# Patient Record
Sex: FEMALE | Race: WHITE | NOT HISPANIC OR LATINO | Employment: UNEMPLOYED | URBAN - METROPOLITAN AREA
[De-identification: names, ages, dates, MRNs, and addresses within clinical notes are randomized per-mention and may not be internally consistent; named-entity substitution may affect disease eponyms.]

---

## 2022-01-04 ENCOUNTER — TELEPHONE (OUTPATIENT)
Dept: BEHAVIORAL HEALTH | Facility: CLINIC | Age: 52
End: 2022-01-04

## 2022-01-04 ENCOUNTER — HOSPITAL ENCOUNTER (EMERGENCY)
Facility: CLINIC | Age: 52
Discharge: PSYCHIATRIC OR SUBSTANCE USE FACILITY | End: 2022-01-05
Attending: EMERGENCY MEDICINE | Admitting: EMERGENCY MEDICINE
Payer: COMMERCIAL

## 2022-01-04 DIAGNOSIS — F29 PSYCHOSIS, UNSPECIFIED PSYCHOSIS TYPE (H): ICD-10-CM

## 2022-01-04 DIAGNOSIS — R44.3 HALLUCINATIONS: ICD-10-CM

## 2022-01-04 DIAGNOSIS — Z91.148 NONCOMPLIANCE WITH MEDICATION REGIMEN: ICD-10-CM

## 2022-01-04 LAB
ALBUMIN UR-MCNC: NEGATIVE MG/DL
AMPHETAMINES UR QL SCN: ABNORMAL
ANION GAP SERPL CALCULATED.3IONS-SCNC: 7 MMOL/L (ref 3–14)
APPEARANCE UR: CLEAR
BARBITURATES UR QL: ABNORMAL
BASOPHILS # BLD AUTO: 0.1 10E3/UL (ref 0–0.2)
BASOPHILS NFR BLD AUTO: 1 %
BENZODIAZ UR QL: ABNORMAL
BILIRUB UR QL STRIP: NEGATIVE
BUN SERPL-MCNC: 9 MG/DL (ref 7–30)
CALCIUM SERPL-MCNC: 9.3 MG/DL (ref 8.5–10.1)
CANNABINOIDS UR QL SCN: ABNORMAL
CHLORIDE BLD-SCNC: 108 MMOL/L (ref 94–109)
CO2 SERPL-SCNC: 25 MMOL/L (ref 20–32)
COCAINE UR QL: ABNORMAL
COLOR UR AUTO: ABNORMAL
CREAT SERPL-MCNC: 0.8 MG/DL (ref 0.52–1.04)
EOSINOPHIL # BLD AUTO: 0.2 10E3/UL (ref 0–0.7)
EOSINOPHIL NFR BLD AUTO: 2 %
ERYTHROCYTE [DISTWIDTH] IN BLOOD BY AUTOMATED COUNT: 12.8 % (ref 10–15)
ETHANOL SERPL-MCNC: 0.12 G/DL
GFR SERPL CREATININE-BSD FRML MDRD: 90 ML/MIN/1.73M2
GLUCOSE BLD-MCNC: 84 MG/DL (ref 70–99)
GLUCOSE UR STRIP-MCNC: NEGATIVE MG/DL
HCG SERPL QL: NEGATIVE
HCT VFR BLD AUTO: 44.3 % (ref 35–47)
HGB BLD-MCNC: 14.8 G/DL (ref 11.7–15.7)
HGB UR QL STRIP: ABNORMAL
HOLD SPECIMEN: NORMAL
IMM GRANULOCYTES # BLD: 0 10E3/UL
IMM GRANULOCYTES NFR BLD: 0 %
KETONES UR STRIP-MCNC: NEGATIVE MG/DL
LEUKOCYTE ESTERASE UR QL STRIP: NEGATIVE
LYMPHOCYTES # BLD AUTO: 2.8 10E3/UL (ref 0.8–5.3)
LYMPHOCYTES NFR BLD AUTO: 31 %
MCH RBC QN AUTO: 32 PG (ref 26.5–33)
MCHC RBC AUTO-ENTMCNC: 33.4 G/DL (ref 31.5–36.5)
MCV RBC AUTO: 96 FL (ref 78–100)
MONOCYTES # BLD AUTO: 0.5 10E3/UL (ref 0–1.3)
MONOCYTES NFR BLD AUTO: 5 %
NEUTROPHILS # BLD AUTO: 5.5 10E3/UL (ref 1.6–8.3)
NEUTROPHILS NFR BLD AUTO: 61 %
NITRATE UR QL: NEGATIVE
NRBC # BLD AUTO: 0 10E3/UL
NRBC BLD AUTO-RTO: 0 /100
OPIATES UR QL SCN: ABNORMAL
PH UR STRIP: 6 [PH] (ref 5–7)
PLATELET # BLD AUTO: 407 10E3/UL (ref 150–450)
POTASSIUM BLD-SCNC: 3.1 MMOL/L (ref 3.4–5.3)
RBC # BLD AUTO: 4.63 10E6/UL (ref 3.8–5.2)
RBC URINE: 0 /HPF
SODIUM SERPL-SCNC: 140 MMOL/L (ref 133–144)
SP GR UR STRIP: 1 (ref 1–1.03)
SQUAMOUS EPITHELIAL: <1 /HPF
UROBILINOGEN UR STRIP-MCNC: NORMAL MG/DL
WBC # BLD AUTO: 8.9 10E3/UL (ref 4–11)
WBC URINE: <1 /HPF

## 2022-01-04 PROCEDURE — 36415 COLL VENOUS BLD VENIPUNCTURE: CPT | Performed by: EMERGENCY MEDICINE

## 2022-01-04 PROCEDURE — 93005 ELECTROCARDIOGRAM TRACING: CPT

## 2022-01-04 PROCEDURE — 99285 EMERGENCY DEPT VISIT HI MDM: CPT | Mod: 25

## 2022-01-04 PROCEDURE — 82310 ASSAY OF CALCIUM: CPT | Performed by: EMERGENCY MEDICINE

## 2022-01-04 PROCEDURE — 80307 DRUG TEST PRSMV CHEM ANLYZR: CPT | Performed by: EMERGENCY MEDICINE

## 2022-01-04 PROCEDURE — 90791 PSYCH DIAGNOSTIC EVALUATION: CPT

## 2022-01-04 PROCEDURE — 82077 ASSAY SPEC XCP UR&BREATH IA: CPT | Performed by: EMERGENCY MEDICINE

## 2022-01-04 PROCEDURE — 81001 URINALYSIS AUTO W/SCOPE: CPT | Mod: XU | Performed by: EMERGENCY MEDICINE

## 2022-01-04 PROCEDURE — 84703 CHORIONIC GONADOTROPIN ASSAY: CPT | Performed by: EMERGENCY MEDICINE

## 2022-01-04 PROCEDURE — 85025 COMPLETE CBC W/AUTO DIFF WBC: CPT | Performed by: EMERGENCY MEDICINE

## 2022-01-04 PROCEDURE — 250N000013 HC RX MED GY IP 250 OP 250 PS 637: Performed by: EMERGENCY MEDICINE

## 2022-01-04 PROCEDURE — C9803 HOPD COVID-19 SPEC COLLECT: HCPCS

## 2022-01-04 RX ORDER — OLANZAPINE 5 MG/1
10 TABLET, ORALLY DISINTEGRATING ORAL AT BEDTIME
Status: DISCONTINUED | OUTPATIENT
Start: 2022-01-04 | End: 2022-01-04

## 2022-01-04 RX ORDER — OLANZAPINE 10 MG/1
10 TABLET ORAL 2 TIMES DAILY
Status: DISCONTINUED | OUTPATIENT
Start: 2022-01-04 | End: 2022-01-05 | Stop reason: HOSPADM

## 2022-01-04 RX ORDER — POLYETHYLENE GLYCOL 3350 17 G
2 POWDER IN PACKET (EA) ORAL
Status: DISCONTINUED | OUTPATIENT
Start: 2022-01-04 | End: 2022-01-05 | Stop reason: HOSPADM

## 2022-01-04 RX ORDER — OLANZAPINE 5 MG/1
10 TABLET, ORALLY DISINTEGRATING ORAL ONCE
Status: COMPLETED | OUTPATIENT
Start: 2022-01-04 | End: 2022-01-04

## 2022-01-04 RX ORDER — ACETAMINOPHEN 325 MG/1
650 TABLET ORAL EVERY 4 HOURS PRN
Status: DISCONTINUED | OUTPATIENT
Start: 2022-01-04 | End: 2022-01-05 | Stop reason: HOSPADM

## 2022-01-04 RX ORDER — POTASSIUM CHLORIDE 750 MG/1
10 TABLET, EXTENDED RELEASE ORAL ONCE
Status: COMPLETED | OUTPATIENT
Start: 2022-01-04 | End: 2022-01-04

## 2022-01-04 RX ORDER — NICOTINE 21 MG/24HR
1 PATCH, TRANSDERMAL 24 HOURS TRANSDERMAL DAILY
Status: DISCONTINUED | OUTPATIENT
Start: 2022-01-04 | End: 2022-01-05 | Stop reason: HOSPADM

## 2022-01-04 RX ADMIN — OLANZAPINE 10 MG: 5 TABLET, ORALLY DISINTEGRATING ORAL at 18:34

## 2022-01-04 RX ADMIN — OLANZAPINE 10 MG: 10 TABLET, FILM COATED ORAL at 22:49

## 2022-01-04 RX ADMIN — POTASSIUM CHLORIDE 10 MEQ: 750 TABLET, FILM COATED, EXTENDED RELEASE ORAL at 18:34

## 2022-01-04 RX ADMIN — NICOTINE 1 PATCH: 14 PATCH, EXTENDED RELEASE TRANSDERMAL at 18:29

## 2022-01-04 RX ADMIN — NICOTINE POLACRILEX 2 MG: 2 LOZENGE ORAL at 21:17

## 2022-01-04 NOTE — ED TRIAGE NOTES
"Pt presents to ED via EMS. Pt was driving around \"looking for answers\" and decided to push her onstar and tell them she needs help because she is hearing voices. Pt has been off her psych meds for several months. Pt is here voluntarily because she does not want to be a danger to herself or others. ABC intact  "

## 2022-01-04 NOTE — ED NOTES
Bed: ED10  Expected date: 1/4/22  Expected time: 2:47 PM  Means of arrival: Ambulance  Comments:  [facility]

## 2022-01-04 NOTE — ED PROVIDER NOTES
History     Chief Complaint:    Psychiatric Evaluation    History was obtained from the patient as well as the [facility]'s office application for a hold as well as from the EMS report given to the nurse.  History is limited due to the patient's confusion and psychosis    HPI   Christina Vásquez is a 51 year old female who presents on a hold.  Per the triage note from EMS information the patient has been off her psychiatric meds for several months.  The patient said that since the death of her mother last June she feels that she is living in a groundhog day situation where every day is the same but not the exact same day.  She says that when she plugs in an address and the navigation of her car will take her in circles.  The radio is changing words laughing at her and talking to her.  She told me that today she called EMS because she was looking for her brother Milind and then said it was her  she was looking for.  The hold from the 's office as she was looking for her .  The patient said that she did drink alcohol today but only had 1 drink she says she drinks 1-2 drinks per day.  She said that she has had some spotting over the last several weeks and had some nausea but no chest pain fevers no head trauma no falls.  She said she was trying to find her brother ended up at a automotive shop called [employer] which is the name of her brother.  Her brother did not work there.  She said she had trouble getting an address.  And then per the hold the patient apparently told officers that she was on a treasure hunt looking for her  and appeared delusional their note said that she does not trust herself but wants help they said that she needed evaluation due to the statement of if she does not get help she will hurt somebody or herself.  Patient denies this currently    Review of Systems  10 point review of systems all negative except as in the HPI limited by patient cooperation and her flipping  "the off    Allergies:  Unknown    Medications:    Report of psychiatric medications that she is not taking    Past Medical History:    Patient says none however sounds like psychiatric problems from the history  Patient reports that she was told that her blood sugar was elevated the past       Past Surgical History:    Unknown    Family History:    Patient has a brother would not state more information about him    Social History:  Reports drinking 1-2 alcoholic beverages per day  Denies drug use    Physical Exam     Patient Vitals for the past 24 hrs:   BP Pulse Resp SpO2   01/04/22 1730 -- 103 12 100 %   01/04/22 1715 -- 100 19 96 %   01/04/22 1700 -- 74 12 100 %   01/04/22 1645 -- 70 13 100 %   01/04/22 1630 -- 73 14 100 %   01/04/22 1615 -- 85 21 100 %   01/04/22 1600 128/76 79 19 100 %   01/04/22 1545 (!) 129/100 85 22 100 %       Physical Exam  General: The patient is alert, in no respiratory distress.  Standing requesting pen to alter the hold from the 's office    HENT: Mucous membranes moist.  Atraumatic    Cardiovascular: Regular rate and rhythm. Good pulses in all four extremities. Normal capillary refill and skin turgor.     Respiratory: Lungs are clear. No nasal flaring. No retractions. No wheezing, no crackles.    Gastrointestinal: Abdomen soft. No guarding, no rebound. No palpable hernias.     Musculoskeletal: No gross deformity.     Skin: No rashes or petechiae.     Neurologic: The patient is alert and conversant.  No slurred speech adequate movement of all extremities no gross deficit    Lymphatic: No cervical adenopathy. No lower extremity swelling.    Psychiatric: The patient is non-tearful.  Patient is agitated and reports hearing voices and will state that \"They\" are doing things to her.  When asked who that is she says she does not know.    Emergency Department Course   ECG:  #1 at 1615 normal sinus rhythm nonspecific ST segment but good of 83  cures duration 84 and a QTC of 502 " with a prolonged QT     #2 EKG at 1704 normal sinus rhythm stable nonsevere ST ventricular 84  124 QRS of 88 QTC improved at 489    Laboratory:  Labs Ordered and Resulted from Time of ED Arrival to Time of ED Departure   ROUTINE UA WITH MICROSCOPIC - Abnormal       Result Value    Color Urine Straw      Appearance Urine Clear      Glucose Urine Negative      Bilirubin Urine Negative      Ketones Urine Negative      Specific Gravity Urine 1.002 (*)     Blood Urine Trace (*)     pH Urine 6.0      Protein Albumin Urine Negative      Urobilinogen Urine Normal      Nitrite Urine Negative      Leukocyte Esterase Urine Negative      RBC Urine 0      WBC Urine <1      Squamous Epithelials Urine <1     DRUG ABUSE SCREEN 1 URINE (ED) - Abnormal    Amphetamines Urine Screen Positive (*)     Barbiturates Urine Screen Negative      Benzodiazepines Urine Screen Negative      Cannabinoids Urine Screen Negative      Cocaine Urine Screen Negative      Opiates Urine Screen Negative     BASIC METABOLIC PANEL - Abnormal    Sodium 140      Potassium 3.1 (*)     Chloride 108      Carbon Dioxide (CO2) 25      Anion Gap 7      Urea Nitrogen 9      Creatinine 0.80      Calcium 9.3      Glucose 84      GFR Estimate 90     ETHYL ALCOHOL LEVEL - Abnormal    Alcohol ethyl 0.12 (*)    HCG QUALITATIVE PREGNANCY - Normal    hCG Serum Qualitative Negative     CBC WITH PLATELETS AND DIFFERENTIAL    WBC Count 8.9      RBC Count 4.63      Hemoglobin 14.8      Hematocrit 44.3      MCV 96      MCH 32.0      MCHC 33.4      RDW 12.8      Platelet Count 407      % Neutrophils 61      % Lymphocytes 31      % Monocytes 5      % Eosinophils 2      % Basophils 1      % Immature Granulocytes 0      NRBCs per 100 WBC 0      Absolute Neutrophils 5.5      Absolute Lymphocytes 2.8      Absolute Monocytes 0.5      Absolute Eosinophils 0.2      Absolute Basophils 0.1      Absolute Immature Granulocytes 0.0      Absolute NRBCs 0.0     COVID-19 VIRUS  (CORONAVIRUS) BY PCR       Procedures:  Seclusion due to the patient attempting to leave    Emergency Department Course:           Reviewed:    I reviewed nursing notes, vitals and past history  Paperwork from the 's department on a hold    Assessments:   I obtained history and examined the patient as noted above.    I rechecked the patient and explained findings.       Consults:   DEC: The patient was eval by keila.  Independent information was gathered by the nurse that the patient is psychotic with hallucinations and very disorganized in her thoughts.  She is not safe to go home and therefore be transferred to a psychiatric facility         Interventions:    Medications   nicotine Patch in Place (has no administration in time range)   nicotine (NICODERM CQ) 14 MG/24HR 24 hr patch 1 patch (1 patch Transdermal Patch/Med Applied 1/4/22 1829)   OLANZapine zydis (zyPREXA) ODT tab 10 mg (10 mg Oral Given 1/4/22 1834)   potassium chloride ER (K-TAB/KLOR-CON) CR tablet 10 mEq (10 mEq Oral Given 1/4/22 1834)       Disposition:  Care of the patient was transferred to my colleague, Dr. So, pending Transfer to a psychiatric inpatient bed..      Medical Decision Making:  The patient presented on a hold and appeared very psychotic in her thoughts.  She reported that she thought the radio was laughing at her that an unknown group of people or reprogramming her car to travel in circles.  The patient is not suicidal homicidal.  She is positive her drug screen for amphetamines which could be leading to this however it sounds like that the symptoms began at the time of the death of her mother.  The patient's daughter did call and and report that the patient did not her meds for some time has been very bizarre.  DEC agreed that the patient is not safe to go home due to her disorganized thought and they will work on an inpatient bed.  The patient was signed out to my oncoming colleague.    Covid-19  Christina Vásquez was  evaluated during a global COVID-19 pandemic, which necessitated consideration that the patient might be at risk for infection with the SARS-CoV-2 virus that causes COVID-19.   Applicable protocols for evaluation were followed during the patient's care.   COVID-19 was considered as part of the patient's evaluation.     Diagnosis:  1. Psychosis, unspecified psychosis type (H)    2. Hallucinations               Aleksandar Davenport MD  01/04/22 9305

## 2022-01-05 ENCOUNTER — HOSPITAL ENCOUNTER (INPATIENT)
Facility: CLINIC | Age: 52
LOS: 5 days | Discharge: HOME | DRG: 885 | End: 2022-01-10
Attending: PSYCHIATRY & NEUROLOGY | Admitting: PSYCHIATRY & NEUROLOGY

## 2022-01-05 VITALS
BODY MASS INDEX: 27.6 KG/M2 | WEIGHT: 150 LBS | HEIGHT: 62 IN | DIASTOLIC BLOOD PRESSURE: 87 MMHG | SYSTOLIC BLOOD PRESSURE: 142 MMHG | TEMPERATURE: 97.9 F | OXYGEN SATURATION: 97 % | HEART RATE: 74 BPM | RESPIRATION RATE: 16 BRPM

## 2022-01-05 DIAGNOSIS — J45.909 UNCOMPLICATED ASTHMA, UNSPECIFIED ASTHMA SEVERITY, UNSPECIFIED WHETHER PERSISTENT: ICD-10-CM

## 2022-01-05 DIAGNOSIS — F17.200 NICOTINE USE DISORDER: ICD-10-CM

## 2022-01-05 DIAGNOSIS — F31.2 BIPOLAR AFFECTIVE DISORDER, CURRENTLY MANIC, SEVERE, WITH PSYCHOTIC FEATURES (H): Primary | ICD-10-CM

## 2022-01-05 DIAGNOSIS — E56.9 VITAMIN DEFICIENCY: ICD-10-CM

## 2022-01-05 LAB
ATRIAL RATE - MUSE: 83 BPM
ATRIAL RATE - MUSE: 84 BPM
DIASTOLIC BLOOD PRESSURE - MUSE: NORMAL MMHG
DIASTOLIC BLOOD PRESSURE - MUSE: NORMAL MMHG
INTERPRETATION ECG - MUSE: NORMAL
INTERPRETATION ECG - MUSE: NORMAL
P AXIS - MUSE: 38 DEGREES
P AXIS - MUSE: 45 DEGREES
PR INTERVAL - MUSE: 122 MS
PR INTERVAL - MUSE: 124 MS
QRS DURATION - MUSE: 80 MS
QRS DURATION - MUSE: 84 MS
QT - MUSE: 414 MS
QT - MUSE: 428 MS
QTC - MUSE: 489 MS
QTC - MUSE: 502 MS
R AXIS - MUSE: 30 DEGREES
R AXIS - MUSE: 43 DEGREES
SARS-COV-2 RNA RESP QL NAA+PROBE: NEGATIVE
SYSTOLIC BLOOD PRESSURE - MUSE: NORMAL MMHG
SYSTOLIC BLOOD PRESSURE - MUSE: NORMAL MMHG
T AXIS - MUSE: 90 DEGREES
T AXIS - MUSE: 94 DEGREES
VENTRICULAR RATE- MUSE: 83 BPM
VENTRICULAR RATE- MUSE: 84 BPM

## 2022-01-05 PROCEDURE — 250N000013 HC RX MED GY IP 250 OP 250 PS 637: Performed by: EMERGENCY MEDICINE

## 2022-01-05 PROCEDURE — 128N000001 HC R&B CD/MH ADULT

## 2022-01-05 PROCEDURE — 87635 SARS-COV-2 COVID-19 AMP PRB: CPT | Performed by: EMERGENCY MEDICINE

## 2022-01-05 RX ORDER — VENLAFAXINE HYDROCHLORIDE 150 MG/1
150 CAPSULE, EXTENDED RELEASE ORAL DAILY
Status: DISCONTINUED | OUTPATIENT
Start: 2022-01-05 | End: 2022-01-05 | Stop reason: HOSPADM

## 2022-01-05 RX ORDER — ALBUTEROL SULFATE 90 UG/1
2 AEROSOL, METERED RESPIRATORY (INHALATION) EVERY 4 HOURS PRN
Status: ON HOLD | COMMUNITY
End: 2022-01-10

## 2022-01-05 RX ORDER — OLANZAPINE 5 MG/1
5 TABLET, ORALLY DISINTEGRATING ORAL 2 TIMES DAILY
Status: DISCONTINUED | OUTPATIENT
Start: 2022-01-05 | End: 2022-01-05

## 2022-01-05 RX ORDER — ESCITALOPRAM OXALATE 5 MG/1
10 TABLET ORAL DAILY
Status: DISCONTINUED | OUTPATIENT
Start: 2022-01-05 | End: 2022-01-05 | Stop reason: HOSPADM

## 2022-01-05 RX ORDER — ESCITALOPRAM OXALATE 10 MG/1
10 TABLET ORAL DAILY
Status: ON HOLD | COMMUNITY
End: 2022-01-10

## 2022-01-05 RX ORDER — VENLAFAXINE HYDROCHLORIDE 150 MG/1
150 CAPSULE, EXTENDED RELEASE ORAL DAILY
Status: ON HOLD | COMMUNITY
End: 2022-01-10

## 2022-01-05 RX ORDER — CLONAZEPAM 0.5 MG/1
0.5 TABLET ORAL 2 TIMES DAILY PRN
Status: ON HOLD | COMMUNITY
End: 2022-01-10

## 2022-01-05 RX ORDER — CLONAZEPAM 0.5 MG/1
0.5 TABLET ORAL 2 TIMES DAILY PRN
Status: DISCONTINUED | OUTPATIENT
Start: 2022-01-05 | End: 2022-01-05 | Stop reason: HOSPADM

## 2022-01-05 RX ADMIN — VENLAFAXINE HYDROCHLORIDE 150 MG: 150 CAPSULE, EXTENDED RELEASE ORAL at 17:14

## 2022-01-05 RX ADMIN — OLANZAPINE 10 MG: 10 TABLET, FILM COATED ORAL at 09:32

## 2022-01-05 RX ADMIN — ESCITALOPRAM 10 MG: 5 TABLET, FILM COATED ORAL at 17:14

## 2022-01-05 RX ADMIN — OLANZAPINE 10 MG: 10 TABLET, FILM COATED ORAL at 20:39

## 2022-01-05 ASSESSMENT — MIFFLIN-ST. JEOR
SCORE: 1266.36
SCORE: 1258.65

## 2022-01-05 NOTE — TELEPHONE ENCOUNTER
1:30pm Bed Search Update:    No MHICU beds at [place] at this time    [facility] beds available  [facility] beds available  [facility] beds available  [facility]-No beds available  [facility]-No beds available  [facility]-No beds available  [facility]-No beds available  [facility]-No beds available  [facility]-No beds available  [facility]-Low acuity only. Mixed unit (adol, adult and eduard)  [place]-No beds available.  No current aggression  [place]-No beds available  [facility]-No beds available  [place]-No beds available  [facility]-No beds available  [facility]-No beds available  [facility]-No beds available..  [facility]-No beds available  [facility]-Voluntary/[place] only. No hx violence or sexual assault.  [facility]-No beds available  [facility]-No beds available  [facility]-No beds available   [facility] only.  [facility].  No recent violence or aggression.  [facility]-No beds available  [facility]-No beds available  [facility]-No high acuity beds available.    Pt remains on wait list pending bed availability.

## 2022-01-05 NOTE — TELEPHONE ENCOUNTER
Updated Bed Search @930PM:  [place]        [facility] @ cap  [facility] @ cap  [facility] @ cap  [facility] @ cap  [facility] @ [facility]  [facility] @ cap  [facility] @ capo  [facility] @ cap  [facility] @ cap        Pt remains on work list until appropriate placement is available.

## 2022-01-05 NOTE — ED PROVIDER NOTES
[facility] ED Mental Health Handoff Note:       Brief HPI:  This is a 51 year old female signed out to me by Dr. So.  See initial ED Provider note for full details of the presentation.     Medically stable for inpatient mental health admission: Yes.    Evaluated by mental health: Yes. The recommendation is for inpatient mental health treatment. Bed search in process    Safety concerns: At the time I received sign out, there were no safety concerns.    Hold Status:  Active Orders   Legal    Emergency Hospitalization Hold (72 Hr Hold)     Frequency: Effective Now     Start Date/Time: 01/05/22 0314      Number of Occurrences: Until Specified    Health Officer Authority to Detain (HEATHER)     Frequency: Effective Now     Start Date/Time: 01/04/22 1522      Number of Occurrences: Until Specified     Order Comments: This patient presented with circumstances that have led me to be reasonably suspicious that the patient is experiencing psychosis. The patient's judgment to this situation appears to be impaired. Given the circumstances in which the patient presented, it is likely that the patient is at significant risk of harming self or others if this situation is not investigated further. I am highly concerned that the patient is mentally ill and currently cannot safely care for oneself. This represents endangerment to the patient's well-being and safety, and I am placing a Health Officer Authority hold upon the patient at this time.         Exam:   Patient Vitals for the past 24 hrs:   BP Temp Temp src Pulse Resp SpO2   01/05/22 0932 (!) 133/92 97.9  F (36.6  C) Oral 80 16 100 %   01/05/22 0641 125/74 -- -- 85 16 100 %   01/04/22 1745 -- -- -- 93 16 100 %   01/04/22 1730 -- -- -- 103 12 100 %   01/04/22 1715 -- -- -- 100 19 96 %   01/04/22 1700 -- -- -- 74 12 100 %   01/04/22 1645 -- -- -- 70 13 100 %   01/04/22 1630 -- -- -- 73 14 100 %   01/04/22 1615 -- -- -- 85 21 100 %   01/04/22 1600 128/76 -- -- 79 19 100 %      ED Course:    There were no significant events during my shift.    Patient was signed out to the oncoming provider, Dr. Lambert.      Impression:    ICD-10-CM    1. Psychosis, unspecified psychosis type (H)  F29    2. Hallucinations  R44.3    3. Noncompliance with medication regimen  Z91.14        Plan:    Awaiting inpatient mental health admission/transfer.      MD Canelo Buck Nicholas J, MD  01/05/22 5727

## 2022-01-05 NOTE — PROGRESS NOTES
Christina Vásquez is reviewed for [facility] service. Will follow and meet with patient/family/care team as able or requested.     Alireza Alberto  [facility], [facility] Extended Care   [phone]

## 2022-01-05 NOTE — ED NOTES
72 hr paperwork and rights given to patient. Pt began yelling and took off nicotine patch, throwing it on the ground. Pt reviewing paperwork, requesting to speak with a doctor.

## 2022-01-05 NOTE — ED NOTES
[facility] ED Behavioral Health Handoff Note:       Brief HPI:  This is a 51 year old female signed out to me by Dr. Lemos .  See initial ED Provider note for details of the presentation.     Patient is medically cleared for admission to a Behavioral Health unit.      Pending studies include none.      Unsure of home medications. Consider medication reconciliation.     The patient is on a hold.  The type of hold is 72 hr hold.      The patient has required medication for agitation.    ED Course:    There were no significant events while under my care.      Med reconciliation occurred and I ordered home medications.      Transferred to [facility] for inpatient psychiatric bed.     Impression:    ICD-10-CM    1. Psychosis, unspecified psychosis type (H)  F29    2. Hallucinations  R44.3    3. Noncompliance with medication regimen  Z91.14        Plan:    1. Admitted to Mental Health Facility      RESULTS:   Results for orders placed or performed during the hospital encounter of 01/04/22 (from the past 24 hour(s))   Asymptomatic COVID-19 Virus (Coronavirus) by PCR Nasopharyngeal     Status: Normal    Collection Time: 01/05/22  6:41 AM    Specimen: Nasopharyngeal; Swab   Result Value Ref Range    SARS CoV2 PCR Negative Negative    Narrative    Testing was performed using the dennis  SARS-CoV-2 & Influenza A/B Assay on the dennis  Denise  System.  This test should be ordered for the detection of SARS-COV-2 in individuals who meet SARS-CoV-2 clinical and/or epidemiological criteria. Test performance is unknown in asymptomatic patients.  This test is for in vitro diagnostic use under the FDA EUA for laboratories certified under CLIA to perform moderate and/or high complexity testing. This test has not been FDA cleared or approved.  A negative test does not rule out the presence of PCR inhibitors in the specimen or target RNA in concentration below the limit of detection for the assay. The possibility of a false negative should be  considered if the patient's recent exposure or clinical presentation suggests COVID-19.  [facility] are certified under the Clinical Laboratory Improvement Amendments of 1988 (CLIA-88) as qualified to perform moderate and/or high complexity laboratory testing.     MD Fausto Cantu [place], MD  01/05/22 1107

## 2022-01-05 NOTE — TELEPHONE ENCOUNTER
0637 Bed Search Update:    No MHICU beds at [facility].    [facility]-No beds available  [facility]-No beds available  [facility]-No beds available  [facility]-No beds available  [facility]-No beds available  [facility]-No beds available  [facility]-No beds available  [facility]-No beds available  [facility]-No beds available  [facility]-Low acuity only. Mixed unit (adol, adult and eduard)  [place]-No beds available.  No current aggression  [place]-No beds available  [facility]-No beds available  [place]-No beds available  [facility]-No beds available  [facility]-No beds available  [facility]-No beds available. Must be holdable. No Intake after 10 PM. 0629 Unsure about bed availability at this time.  [place]-No beds available  [facility]-Voluntary/[place] only. No hx violence or sexual assault.  [facility]-No beds available  [facility]-No beds available  [facility]-No recent hx violence/aggression. Must be able to program in groups.  [facility]-Low acuity only.  [facility]-No [facility].  No recent violence or aggression.  [facility] [place]-No beds available  [facility]-No beds available  [facility]-No high acuity beds available.    Pt remains on wait list pending bed availability.

## 2022-01-05 NOTE — PHARMACY-ADMISSION MEDICATION HISTORY
Admission medication history interview status for this patient is complete. See [facility] admission navigator for allergy information, prior to admission medications and immunization status.     Medication history interview source(s):Patient  Medication history resources (including written lists, pill bottles, clinic record): clinic records  Primary pharmacy:unknown    Changes made to PTA medication list:  Added: entire list  Deleted: na  Changed: na    Actions taken by pharmacist (provider contacted, etc):none       Additional medication history information:   Pt is non-compliant with medications. Last dose approx. 1.5 weeks ago.      Medication reconciliation/reorder completed by provider prior to medication history? No    For patients on insulin therapy: No    Prior to Admission medications    Medication Sig Last Dose Taking? Auth Provider   albuterol (PROAIR HFA/PROVENTIL HFA/VENTOLIN HFA) 108 (90 Base) MCG/ACT inhaler Inhale 2 puffs into the lungs every 4 hours as needed for shortness of breath / dyspnea or wheezing  Past Month at Unknown time Yes Unknown, Entered By History   clonazePAM (KLONOPIN) 0.5 MG tablet Take 0.5 mg by mouth 2 times daily as needed for anxiety Past Month at Unknown time Yes Unknown, Entered By History   escitalopram (LEXAPRO) 10 MG tablet Take 10 mg by mouth daily Past Month at Unknown time Yes Unknown, Entered By History   fluticasone-vilanterol (BREO ELLIPTA) 100-25 MCG/INH inhaler Inhale 1 puff into the lungs daily Past Month at Unknown time Yes Unknown, Entered By History   venlafaxine (EFFEXOR-XR) 150 MG 24 hr capsule Take 150 mg by mouth daily Past Month at Unknown time Yes Unknown, Entered By History

## 2022-01-05 NOTE — PROVIDER NOTIFICATION
Pharmacy was consulted to perform a medication history on this patient boarding in the [facility] ED. Patient was uncooperative and [facility] unable to obtain med Hx. RN/Provider was notified that we are unable to perform this consult until after 0730 on 1/5. Disposition: RN/Provider will review any PTA medications with patient that may be due overnight.  ABC, OTONIEL

## 2022-01-05 NOTE — TELEPHONE ENCOUNTER
S: 10:31 pm Pt is a 51 yr old fem in [facility] ED for psychosis report by Nely     B: Hx of psychosis and depression.  Pt was bib police due to confusion.  Alcohol level was .12 and utox pos for meth.  Pt reports she's not on any meds.  ED talked to daughter who reports pt stopped her lithium several months ago.   reports pt became agitated.  Pt was restrained and given Zyprexa.  Potassium was low but replaced by ED.  COVID test ordered but no collected.     A: HEATHER will change to 72 when expires    R: Pt waiting in ED for appropriate placement.     Patient cleared and ready for behavioral bed placement: Yes

## 2022-01-05 NOTE — ED NOTES
"1/4/2022  Christina Vásquez 1970     [facility] Crisis Assessment    Patient was assessed: remote  Patient location: [facility] ED     Referral Data and Chief Complaint  Pt is a 51 year old who uses female pronouns. Patient presented to the ED via EMS and was referred to the ED by community provider(s). Patient is presenting to the ED for the following concerns: psychosis and delusional thinking symptoms. Pt was brought to the ED by EMS and was placed on a hold by [facility]'s office due to concerns with pt's state of confusion and possible psychosis symptoms upon their encounter with her.     Informed Consent and Assessment Methods    Patient is her own guardian. Writer met with patient and explained the crisis assessment process, including applicable information disclosures and limits to confidentiality, assessed understanding of the process, and obtained consent to proceed with the assessment. Patient was observed to be able to participate in the assessment as evidenced by verbal and written consent . Assessment methods included conducting a formal interview with patient, review of medical records, collaboration with medical staff, and obtaining relevant collateral information from family and community providers when available.    Narrative Summary of Presenting Problem and Current Functioning  What led to the patient presenting for crisis services, factors that make the crisis life threatening or complex, stressors, how is this disrupting the patient's life, and how current functioning is in comparison to baseline. How is patient presenting during the assessment.     Pt was reported to have a hx of depression and psychosis symptoms and has been off her psychiatric medication for the past several months (daughter reports 7-8 months). The [facility]'s office got involved after pt pushed the \"[employer]\" button in her vehicle and requested for help due the navigation in her car driving her in circles. She also " "reported that she contacted EMS because she was looking for her brother, Milind and then later said she was looking for her . The pt told officers and ED staff that she was going on a \"treasure hunt\" to look for her  and reported to the officers that she does not trust herself, but wants help. She also made a statement to police that if she does not get help she will hurt someone and someone else and that she has been hearing voices. Pt initially agreed to come to the ED and reported of wanting further evaluation. Yet, later while in the ED, pt became further irritable and agitated. Pt was given a 10 mg dosage of zyprexa to assist in her calming down but staff had to call a code 21 and attempted to leave. Pt was eventually able to further calm down and did not require use of restraints. She agreed to participate in a mental health evaluation due to her desire to leave.       Upon the assessment, pt presented as being cooperative yet irritable and agitated about being in the ED. Pt's appearance was disheveled and she appeared to be in some distress. Pt's speech was pressured at times and her thought process was questionable and suggested delusional thinking. Pt kept referring back to the idea that she was going on a \"treasure hunt\" and now she is giving up. Pt at times said she wanted to find her , but later stated he was in [place] at home and that she left the home to seek care and a CT scan for her head in [place]. Pt further identified having multiple recent stressors including, \"I lost my job, I lost my , I lost everything.\" Pt further stated that she had an appointment to speak to an  tomorrow to start the divorce process. Pt denied having any SI or HI. Pt further stated that part of her issue was that she ran out of her medication (lithium) a couple weeks ago. Pt stated she would be fine if she resumed taking her medication and that she had a prescription waiting to be filled at [facility], stating that she just needed the ED physician to refill her medication. Pt denied use of any drugs other than taking half of a cannabis \"gummy bear\" three weeks ago. Pt's utox came positive for amphetamines. Pt also denied using any alcohol, although her etoh results were at.12 while in the ED around 3:30 pm.         History of the Crisis  Duration of the current crisis, coping skills attempted to reduce the crisis, community resources used, and past presentations.    Hx for pt was limited due to pt's confusion and psychosis symptoms in addition to having limited information in her chart records. Pt's daughter, Wendy [phone] called and spoke to the ED nurse and provided additional collateral. She reported that the family has been very concerned about pt's mental health over the past 7-8 months (when she advised of her going off her medication). She reported that pt left [place] two weeks ago to find [employer] then ran out of gas. She reported they have been trying to get pt into treatment for her mental health, but that pt has refused and has been difficult to find care for her in [place].   Collateral Information  Reviewed pt's chart in Epic, obtained initial collateral from ED nurse-Esme, attempted to call pt's daughter, Wendy [phone]-left two voicemails.     Risk Assessment    Risk of Harm to Self     ESS-6  1.a. Over the past 2 weeks, have you had thoughts of killing yourself? No  1.b. Have you ever attempted to kill yourself and, if yes, when did this last happen? Yes pt reported she had an SI attempt over 20 years ago   2. Recent or current suicide plan? No   3. Recent or current intent to act on ideation? No  4. Lifetime psychiatric hospitalization? Yes  5. Pattern of excessive substance use? No  6. Current irritability, agitation, or aggression? Yes  Scoring note: BOTH 1a and 1b must be yes for it to score 1 point, if both are not yes it is zero. All others are 1 point per " number. If all questions 1a/1b - 6 are no, risk is negligible. If one of 1a/1b is yes, then risk is mild. If either question 2 or 3, but not both, is yes, then risk is automatically moderate regardless of total score. If both 2 and 3 are yes, risk is automatically high regardless of total score.     Score: 2, moderate risk    The patient has the following risk factors for suicide: depressive symptoms, isolation, poor decision making, poor impulse control, prior suicide attempt, psychosis, restless/agitated and family disruption    Is the patient experiencing current suicidal ideation: No    Is the patient engaging in preparatory suicide behaviors (formulating how to act on plan, giving away possessions, saying goodbye, displaying dramatic behavior changes, etc)? No    Does the patient have access to firearms or other lethal means? no    The patient has the following protective factors: future focused thinking and sense of obligation to people/pets    Support system information: Pt reports of having support from her 4 adult children whom all live in [facility].     Patient strengths: n/a    Does the patient engage in non-suicidal self-injurious behavior (NSSI/SIB)? no    Is the patient vulnerable to sexual exploitation?  No    Is the patient experiencing abuse or neglect? no    Is the patient a vulnerable adult? No      Risk of Harm to Others  The patient has the following risk factors of harm to others: agitation    Does the patient have thoughts of harming others? No    Is the patient engaging in sexually inappropriate behavior?  no       Current Substance Abuse    Is there recent substance abuse? Substance type(s): alcohol  Frequency: unknown  Quantity: unknown (pt denied us) Method: unknown  Duration: unknown  Last use: pt denied of any alcohol use, but her etoh results were .12 upon arrival at the ED     Was a urine drug screen or blood alcohol level obtained: Yes utox results were positive for  "amphetamines-pt denied of any meth or amphetamine use       Current Symptoms/Concerns    Symptoms  Attention, hyperactivity, and impulsivity symptoms present: Yes: Impulsive and Restless    Anxiety symptoms present: Yes: Generalized Symptoms: Agitation and Avoidance      Appetite symptoms present: No     Behavioral difficulties present: Yes: Agitation, Impulsivity/Disinhibition and Withdrawal/Isolation     Cognitive impairment symptoms present: Yes: Judgment/Insight    Depressive symptoms present: Yes Depressed mood, Impaired decision making , Increased irritability/agitation, Isolative  and Sleep disturbance      Eating disorder symptoms present: No    Learning disabilities, cognitive challenges, and/or developmental disorder symptoms present: No     Manic/hypomanic symptoms present: Yes Decreased need for sleep, Increased irritability/agitation and High risk behavior: (impulsively driving out of state \"going on treasure hunt\" )    Personality and interpersonal functioning difficulties present : Yes: Impaired Impulse Control    Psychosis symptoms present: Yes: Hallucinations: Auditory and Delusions: Ideas of Reference: \"going on a treasure hunt\"-pt left out of state.  and Dysmorphic: multiple recent stressors       Sleep difficulties present: Yes: Difficulty falling asleep  and Difficulty staying sleep     Substance abuse disorder symptoms present: No     Trauma and stressor related symptoms present: No           Mental Status Exam   Affect: Blunted and Constricted   Appearance: Disheveled    Attention Span/Concentration: Attentive?    Eye Contact: Intense   Fund of Knowledge: Other: questionable for delusional thinking     Language /Speech Content: Fluent   Language /Speech Volume: Normal    Language /Speech Rate/Productions: Minimally Responsive and Pressured    Recent Memory: Variable   Remote Memory: Variable   Mood: Irritable    Orientation to Person: Yes    Orientation to Place: Yes   Orientation to Time of " "Day: Yes    Orientation to Date: Yes    Situation (Do they understand why they are here?): Yes    Psychomotor Behavior: Agitated    Thought Content: Delusions   Thought Form: Obsessive/Perseverative       Mental Health and Substance Abuse History    History  Current and historical diagnoses or mental health concerns: Pt reports having hx of depression and psychosis (unsure of type).     Prior MH services (inpatient, programmatic care, outpatient, etc) : Yes Pt reports of having one previous hospitalization over 20 years ago due to SI attempt. Limited hx in pt's chart.     History of substance abuse: No    Prior GERALDO services (inpatient, programmatic care, detox, outpatient, etc) : No    History of commitment: No    Family history of MH/GERALDO: No    Trauma history: No    Medication  Psychotropic medications: No current medications but a history of being prescribed lithium. Pt reports of stopping her medication a week and half ago, but her daughter whom provided collateral to ED RN reports she stopped taking her meds 7-8 months ago .    Current Care Team  Primary Care Provider: Yes. Name: Alexei Bustamante MD. Location: [facility] . Date of last visit: pt reports 2-3 weeks ago, but pt's daughter reported she has not been following up with providers/care. Frequency: n/a. Perceived helpfulness: n/a.    Psychiatrist: No    Therapist: No    : No    CTSS or ARMHS: No    ACT Team: No    Other: No    Release of Information  Was a release of information signed: No. Reason: Pt reported of not wanting report to be released to her current providers.       Biopsychosocial Information    Socioeconomic Information  Current living situation: Pt is from [place], [place]. Pt reports she was living with her  up until two weeks ago when she decided to go on a \"treasure hunt\" to find her  and to seek care in [place] for CT scan. Pt reports she has been staying with her brother, Milind Ramey whom lives in " "[place] (could not recall his contact info). Pt reports that she  from her  because she does not want \"to hurt anyone anymore.\" Pt reports of having four adult children and several grandchildren whom are supportive and all live in [place].     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     Relevant legal issues: Pt denies of any legal issues.     Cultural, Mu-ism, or spiritual influences on mental health care: n/a    Is the patient active in the  or a : No      Relevant Medical Concerns   Patient identifies concerns with completing ADLs? No     Patient can ambulate independently? Yes     Other medical concerns? No     History of concussion or TBI? No        Diagnosis    298.9 (F29)  Unspecified Schizophrenia Spectrum - by history     296.24 (F32.3)  Major Depressive Disorder, Single Episode, With psychotic features _ and With mixed features - by history       Therapeutic Intervention  The following therapeutic methodologies were employed when working with the patient: establishing rapport, active listening, assessing dimensions of crisis, solution focused brief therapy, psychoeducation, brief supportive therapy and trauma informed care. Patient response to intervention: pt did not seem open to any type of treatment suggestions-stated she wanted to leave ED and follow up with her primary care physician or go to [facility] to get her medication refilled.       Disposition  Recommended disposition: Inpatient Mental Health Pt is at risk of harming herself and others due to psychosis symptoms-delusional thinking and auditory hallucinations in addition to reckless impulsivity and behaviors. Pt has a hx of psychosis and depression and has been off her lithium medication. Pt is from [place], [place] but was placed on an HEATHER by [facility]'s office due to concerns of her " mental health and state of confusion upon their arrival after she contacted [employer] and stated that her GPA was making her drive in circles and that she could not find her brother (then in confusion stated her  instead). Pt has been experiencing auditory hallucinations and stated that she felt she would harm others or herself if she does not get help. Pt later attempted to leave the ED and stated she did not want to stay in the hospital. Pt's daughter and family have been concerned about pt's mental health and reported of feeling pt is unsafe to discharge and needs to get back on her medication. The recommendation is for pt to be hospitalized due to her psychosis symptoms and risk of harming self and others for safety and stabilization on medication. ED physician is in agreement with plan-reports will likely need to to put pt on 72 hr hold.     Reviewed case and recommendations with attending provider. Attending Name:  Aleksandar Yost MD     Attending concurs with disposition: Yes      Patient concurs with disposition: No: pt initially wanted ip treatment then tried to leave ed      Guardian concurs with disposition: NA     Final disposition: Inpatient mental health .     Inpatient Details (if applicable):  Is patient admitted voluntarily:Pt is currently on HEATHER hold-ED dr plans to start 72 hr hold     Patient aware of potential for transfer if there is not appropriate placement? NA     Patient is willing to travel outside of the [facility] for placement? NA      Behavioral Intake Notified? Yes: Date: 1/4/22        Clinical Substantiation of Recommendations   Rationale with supporting factors for disposition and diagnosis.     Pt is at risk of harming herself and others due to psychosis symptoms-delusional thinking and auditory hallucinations in addition to reckless impulsivity and behaviors. Pt has a hx of psychosis and depression and has been off her lithium medication. Pt is from [place], [place] but was placed  on an HEATHER by [facility]'s office due to concerns of her mental health and state of confusion upon their arrival after she contacted [employer] and stated that her GPA was making her drive in circles and that she could not find her brother (then in confusion stated her  instead). Pt has been experiencing auditory hallucinations and stated that she felt she would harm others or herself if she does not get help. Pt later attempted to leave the ED and stated she did not want to stay in the hospital. Pt's daughter and family have been concerned about pt's mental health and reported of feeling pt is unsafe to discharge and needs to get back on her medication. The recommendation is for pt to be hospitalized due to her psychosis symptoms and risk of harming self and others for safety and stabilization on medication. ED physician is in agreement with plan-reports will likely need to to put pt on 72 hr hold.         Assessment Details  Patient interview started at:  7:40 pm and completed at: 9:00 pm     Total duration spent on the patient case in minutes: 3.00 hrs     CPT code(s) utilized: 67471 - Psychotherapy for Crisis - 60 (30-74*) min and 36896 - Psychotherapy for Crisis (Each additional 30 minutes) - 30 min

## 2022-01-06 PROBLEM — F29 PSYCHOSIS (H): Status: ACTIVE | Noted: 2022-01-06

## 2022-01-06 LAB — POTASSIUM BLD-SCNC: 4.6 MMOL/L (ref 3.5–5)

## 2022-01-06 PROCEDURE — 250N000013 HC RX MED GY IP 250 OP 250 PS 637: Performed by: PSYCHIATRY & NEUROLOGY

## 2022-01-06 PROCEDURE — 36415 COLL VENOUS BLD VENIPUNCTURE: CPT | Performed by: PSYCHIATRY & NEUROLOGY

## 2022-01-06 PROCEDURE — 99223 1ST HOSP IP/OBS HIGH 75: CPT | Performed by: PSYCHIATRY & NEUROLOGY

## 2022-01-06 PROCEDURE — 84132 ASSAY OF SERUM POTASSIUM: CPT | Performed by: PSYCHIATRY & NEUROLOGY

## 2022-01-06 PROCEDURE — 128N000001 HC R&B CD/MH ADULT

## 2022-01-06 PROCEDURE — 82248 BILIRUBIN DIRECT: CPT | Performed by: PSYCHIATRY & NEUROLOGY

## 2022-01-06 RX ORDER — OLANZAPINE 10 MG/2ML
10 INJECTION, POWDER, FOR SOLUTION INTRAMUSCULAR 3 TIMES DAILY PRN
Status: DISCONTINUED | OUTPATIENT
Start: 2022-01-06 | End: 2022-01-10 | Stop reason: HOSPADM

## 2022-01-06 RX ORDER — ACETAMINOPHEN 325 MG/1
650 TABLET ORAL EVERY 4 HOURS PRN
Status: DISCONTINUED | OUTPATIENT
Start: 2022-01-06 | End: 2022-01-10 | Stop reason: HOSPADM

## 2022-01-06 RX ORDER — OLANZAPINE 10 MG/1
10 TABLET ORAL 3 TIMES DAILY PRN
Status: DISCONTINUED | OUTPATIENT
Start: 2022-01-06 | End: 2022-01-10 | Stop reason: HOSPADM

## 2022-01-06 RX ORDER — GABAPENTIN 300 MG/1
300 CAPSULE ORAL EVERY 6 HOURS PRN
Status: DISCONTINUED | OUTPATIENT
Start: 2022-01-06 | End: 2022-01-10 | Stop reason: HOSPADM

## 2022-01-06 RX ORDER — DIAZEPAM 5 MG
10 TABLET ORAL
Status: DISCONTINUED | OUTPATIENT
Start: 2022-01-06 | End: 2022-01-10 | Stop reason: HOSPADM

## 2022-01-06 RX ORDER — FOLIC ACID 1 MG/1
1 TABLET ORAL DAILY
Status: DISCONTINUED | OUTPATIENT
Start: 2022-01-06 | End: 2022-01-10 | Stop reason: HOSPADM

## 2022-01-06 RX ORDER — TRAZODONE HYDROCHLORIDE 50 MG/1
50 TABLET, FILM COATED ORAL
Status: DISCONTINUED | OUTPATIENT
Start: 2022-01-06 | End: 2022-01-06

## 2022-01-06 RX ORDER — HYDROXYZINE HYDROCHLORIDE 25 MG/1
50 TABLET, FILM COATED ORAL EVERY 4 HOURS PRN
Status: DISCONTINUED | OUTPATIENT
Start: 2022-01-06 | End: 2022-01-10 | Stop reason: HOSPADM

## 2022-01-06 RX ORDER — LITHIUM CARBONATE 300 MG/1
300 TABLET, FILM COATED, EXTENDED RELEASE ORAL EVERY 12 HOURS SCHEDULED
Status: DISCONTINUED | OUTPATIENT
Start: 2022-01-06 | End: 2022-01-10 | Stop reason: HOSPADM

## 2022-01-06 RX ORDER — OLANZAPINE 10 MG/1
10 TABLET ORAL AT BEDTIME
Status: DISCONTINUED | OUTPATIENT
Start: 2022-01-06 | End: 2022-01-10 | Stop reason: HOSPADM

## 2022-01-06 RX ORDER — ONDANSETRON 4 MG/1
4 TABLET, ORALLY DISINTEGRATING ORAL EVERY 6 HOURS PRN
Status: DISCONTINUED | OUTPATIENT
Start: 2022-01-06 | End: 2022-01-10 | Stop reason: HOSPADM

## 2022-01-06 RX ORDER — ALBUTEROL SULFATE 90 UG/1
2 AEROSOL, METERED RESPIRATORY (INHALATION) EVERY 4 HOURS PRN
Status: DISCONTINUED | OUTPATIENT
Start: 2022-01-06 | End: 2022-01-10 | Stop reason: HOSPADM

## 2022-01-06 RX ORDER — POTASSIUM CHLORIDE 1500 MG/1
40 TABLET, EXTENDED RELEASE ORAL ONCE
Status: COMPLETED | OUTPATIENT
Start: 2022-01-06 | End: 2022-01-06

## 2022-01-06 RX ORDER — MAGNESIUM HYDROXIDE/ALUMINUM HYDROXICE/SIMETHICONE 120; 1200; 1200 MG/30ML; MG/30ML; MG/30ML
30 SUSPENSION ORAL EVERY 4 HOURS PRN
Status: DISCONTINUED | OUTPATIENT
Start: 2022-01-06 | End: 2022-01-10 | Stop reason: HOSPADM

## 2022-01-06 RX ORDER — MULTIVITAMIN,THERAPEUTIC
1 TABLET ORAL DAILY
Status: DISCONTINUED | OUTPATIENT
Start: 2022-01-06 | End: 2022-01-10 | Stop reason: HOSPADM

## 2022-01-06 RX ADMIN — POTASSIUM CHLORIDE 40 MEQ: 1500 TABLET, EXTENDED RELEASE ORAL at 04:41

## 2022-01-06 RX ADMIN — FOLIC ACID 1 MG: 1 TABLET ORAL at 09:00

## 2022-01-06 RX ADMIN — FLUTICASONE FUROATE AND VILANTEROL TRIFENATATE 1 PUFF: 100; 25 POWDER RESPIRATORY (INHALATION) at 08:52

## 2022-01-06 RX ADMIN — LITHIUM CARBONATE 300 MG: 300 TABLET, FILM COATED, EXTENDED RELEASE ORAL at 22:16

## 2022-01-06 RX ADMIN — Medication 100 MG: at 09:01

## 2022-01-06 RX ADMIN — OLANZAPINE 10 MG: 10 TABLET, FILM COATED ORAL at 22:16

## 2022-01-06 RX ADMIN — THERA TABS 1 TABLET: TAB at 09:00

## 2022-01-06 ASSESSMENT — ACTIVITIES OF DAILY LIVING (ADL)
LAUNDRY: WITH SUPERVISION
HYGIENE/GROOMING: INDEPENDENT
HYGIENE/GROOMING: INDEPENDENT
DRESS: INDEPENDENT
ORAL_HYGIENE: INDEPENDENT
LAUNDRY: WITH SUPERVISION
DRESS: SCRUBS (BEHAVIORAL HEALTH)
ORAL_HYGIENE: INDEPENDENT

## 2022-01-06 NOTE — ED NOTES
Pt. Gave verbal sent for the writer to contact daughter Wendy with an update. Writer updated Wendy via phone call.

## 2022-01-06 NOTE — PLAN OF CARE
BEHAVIORAL TEAM DISCUSSION    Participants: Provider- BC, Occupational Therapist- AF, Pharmacy- DT, - MW, Nurse- GOKUL  Progress: Patient is progressing towards discharge home with outpatient supports and continuing to get back on her medication.  Anticipated length of stay: 5-7 days  Continued Stay Criteria/Rationale: Symptom Stabilization  Medical/Physical: None reported  Precautions:   Behavioral Orders   Procedures    Assault precautions    Code 1 - Restrict to Unit    Routine Programming     As clinically indicated    Status 15     Every 15 minutes.    Suicide precautions     Patients on Suicide Precautions should have a Combination Diet ordered that includes a Diet selection(s) AND a Behavioral Tray selection for Safe Tray - with utensils, or Safe Tray - NO utensils      Withdrawal precautions     Plan: Plan is to assess patient and discuss outpatient supports and if appropriate when it's appropriate for patient to discharge home.  Rationale for change in precautions or plan: None reported         Partial Purse String (Simple) Text: Given the location of the defect and the characteristics of the surrounding skin a simple purse string closure was deemed most appropriate.  Undermining was performed circumfirentially around the surgical defect.  A purse string suture was then placed and tightened. Wound tension only allowed a partial closure of the circular defect.

## 2022-01-06 NOTE — PLAN OF CARE
Problem: Behavioral Health Plan of Care  Goal: Plan of Care Review  Outcome: No Change  Flowsheets  Taken 2022 1740  Plan of Care Reviewed With: patient  Patient Agreement with Plan of Care: refuses to participate  Taken 2022 1700  Plan of Care Reviewed With: patient  Patient Agreement with Plan of Care: refuses to participate  Goal: Patient-Specific Goal (Individualization)  Outcome: No Change  Flowsheets (Taken 2022 1740)  Patient Vulnerabilities:    lacks insight into illness    poor impulse control  Patient Personal Strengths:    expressive of emotions    expressive of needs    intellectual cognitive skills    independent living skills  Note: Shift Summery:      Patient's Stated Goal for Shift:      Goal Status:        Pt has been in bed /isolative in room most of shift. Pt expressed anger, and frustration regarding the fact that  she will be in the hospital through the weekend. Pt  emphatically stated that her hold was supposed to  22 an somebody decided to extend it. Pt was dismissive /resistant to any form of explanation. Pt  Endorsed anxiety 5/10 and associated this to her hold. Pt declined all offered anxiety relieving intervention and vowed not to eat until she is discharged. After multiple approaches / encouragement, patient finally came out of room and had dinner in comfort room at 8 pm. Pt ate 50% of  meal and returned to bed as soon as she was done eating. Pt took scheduled medications with no problems. Monitoring is on going

## 2022-01-06 NOTE — PHARMACY-ADMISSION MEDICATION HISTORY
Admission medication history completed at [facility]. Please see Pharmacy - Admission Medication History note from 01/05/2022.    Vinnie Gibson RPH on 1/5/2022 at 11:47 PM

## 2022-01-06 NOTE — PLAN OF CARE
"  Problem: Suicidal Behavior  Goal: Suicidal Behavior is Absent or Managed  Outcome: No Change     Problem: Behavioral Health Plan of Care  Goal: Patient-Specific Goal (Individualization)  Outcome: No Change  Flowsheets (Taken 1/6/2022 1011 by Stephanie Monroy MSW)  Patient-Specific Goals (Include Timeframe): Patient's goal is to discharge home and continue to stabilize in the community with outpatient mental health supports.  Patient Personal Strengths:   expressive of emotions   expressive of needs   independent living skills  Note:     Goal: Adheres to Safety Considerations for Self and Others  Outcome: No Change  Intervention: Develop and Maintain Individualized Safety Plan  Recent Flowsheet Documentation  Taken 1/6/2022 1400 by Nadja Kirk, RN  Safety Measures: safety rounds completed  Goal: Absence of New-Onset Illness or Injury  Outcome: No Change  Intervention: Identify and Manage Fall Risk  Recent Flowsheet Documentation  Taken 1/6/2022 1400 by Nadja Kirk, RN  Safety Measures: safety rounds completed   Pt has very quiet voice. Not interested in talking about being here or any plans. Only the briefest answers to assessment questions.  Withdrawn and isolative to room. Will not allow shades to be opened. Refused to go to meals \"not hungry and I don't want to be by other patients.\"  drinking water that I put at bedside. Took medications. Contracts for safety. Denies any psych symptoms. \"just really tired.\"  "

## 2022-01-06 NOTE — PROGRESS NOTES
01/06/22 1435   Engagement   Intervention Group   Topic Detail OT Creative Expressions group-Collaging for insight development, creativity, social engagement, focus, following directions, healthy distraction, and symptom management   Attendance Did not attend

## 2022-01-06 NOTE — PROGRESS NOTES
"At 0105: [facility] pharmacy called and told the write, \"tell the patient assigned RN to give one times potassium dose now. The writer passed the verbal message to the assigned RN for this shift at 0108.    "

## 2022-01-06 NOTE — PROGRESS NOTES
At 0120: the patient assigned RN checked  The 4500 Acu-dose to give potassium, but the 4500 Acu-dose does not have in stock. Writer called to FREDIS RN to look the medication from others Unit.
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
## 2022-01-06 NOTE — PROGRESS NOTES
Pt is a 51 year old female who  arrived at  unit 4500 from [facility] at 2330 via stretcher  and accompanied by EMS, nursing assistant,  and . patient is  alert and oriented X4       On arrival to the unit vitals, weight and height obtained, all are WDL. Down to gown completed with no remarkable findings and pt was provided with clean spice scrubs and non-slip socks to change into.  Snack and  fluids offered and pt accepted.     During admission, Pt verbalized that she is here because  the hospital told her to come  after she asked the hospital for help    ER reported that pt  has been off her psychiatric meds for several months.  Thinks the radio is changing words laughing at her and talking to her. She called EMS because she was looking for her brother [employer].  She said she was trying to find her brother and ended up at a automotive shop called [employer] which is the name of her brother.     Admission packet provided and content  such as bill of right, and unit rules explained; Patient  acknowledge and signed.    Pt endorsed smoking  1 pack of cigarettes  a day since she was 14 years old    Pt endorsed drinking alcohol 1-2  Alcohol drinks daily.    Pt denies anxiety, SI,HI,Hallucination    Skin noted to have no skin issues    Pt  was cooperative with assessment  process and questions and contracted for safety     Will continue to monitor and assist as needed.

## 2022-01-06 NOTE — PROGRESS NOTES
01/06/22 1109   Engagement   Intervention Group   Topic Concentration/problem solving   Topic Detail accessible chair yoga, massage and breathwork for wellbeing, coping, leisure, body awareness   Attendance Did not attend

## 2022-01-06 NOTE — PLAN OF CARE
Assessment/Intervention/Current Symtoms and Care Coordination   met with patient to check in and complete an intake with her.  attempted twice and both times she was sleeping and didn't want to be waken up. She was irritable and told  to read her chart to answer any questions she has.  asked patient how she ended up in [place] from [place] by getting in her car and driving.  asked if she knows where her car is now and she said she thinks her brother has it.  asked patient how she's feeling and she stated her plan is to just sleep until her 72-hour hold is up and then leave. Patient then told her to leave her room and let her sleep.  attempted to call her emergency contact, Wendy- her daughter, as this release was signed and this is the only number they have on file for collateral and no one answered and her mailbox was full and  couldn't leave a message. She will attempt to call her again.    Discharge Plan or Goal  TBD    Barriers to Discharge   72-hour hold and Symptom Stabilization     Referral Status  None    Legal Status  72-hour hold ends 1/10 at 0315    MARIAN [facility], 1/6/2022, 2:01 PM

## 2022-01-07 PROCEDURE — 128N000001 HC R&B CD/MH ADULT

## 2022-01-07 PROCEDURE — 250N000013 HC RX MED GY IP 250 OP 250 PS 637: Performed by: PSYCHIATRY & NEUROLOGY

## 2022-01-07 PROCEDURE — 99233 SBSQ HOSP IP/OBS HIGH 50: CPT | Performed by: PSYCHIATRY & NEUROLOGY

## 2022-01-07 RX ADMIN — NICOTINE POLACRILEX 4 MG: 4 GUM, CHEWING ORAL at 20:15

## 2022-01-07 RX ADMIN — OLANZAPINE 10 MG: 10 TABLET, FILM COATED ORAL at 20:15

## 2022-01-07 RX ADMIN — FLUTICASONE FUROATE AND VILANTEROL TRIFENATATE 1 PUFF: 100; 25 POWDER RESPIRATORY (INHALATION) at 09:08

## 2022-01-07 RX ADMIN — THERA TABS 1 TABLET: TAB at 09:05

## 2022-01-07 RX ADMIN — FOLIC ACID 1 MG: 1 TABLET ORAL at 09:05

## 2022-01-07 RX ADMIN — LITHIUM CARBONATE 300 MG: 300 TABLET, FILM COATED, EXTENDED RELEASE ORAL at 20:15

## 2022-01-07 RX ADMIN — Medication 100 MG: at 09:05

## 2022-01-07 RX ADMIN — LITHIUM CARBONATE 300 MG: 300 TABLET, FILM COATED, EXTENDED RELEASE ORAL at 09:05

## 2022-01-07 RX ADMIN — ALBUTEROL SULFATE 2 PUFF: 90 AEROSOL, METERED RESPIRATORY (INHALATION) at 09:07

## 2022-01-07 ASSESSMENT — ACTIVITIES OF DAILY LIVING (ADL)
DRESS: INDEPENDENT
LAUNDRY: WITH SUPERVISION
ORAL_HYGIENE: INDEPENDENT
ORAL_HYGIENE: INDEPENDENT
LAUNDRY: WITH SUPERVISION
HYGIENE/GROOMING: INDEPENDENT
DRESS: INDEPENDENT
HYGIENE/GROOMING: INDEPENDENT

## 2022-01-07 NOTE — PROGRESS NOTES
Occupational Therapy    Care plan initiated for patient who admitted on 1/5/22. Occupational therapy will engage patient as appropriate, providing invitation to groups and encouraging active participation.  Formal assessment to be completed next week.    Lisa Mclaughlin OT  1/7/2022

## 2022-01-07 NOTE — PLAN OF CARE
Problem: Suicidal Behavior  Goal: Suicidal Behavior is Absent or Managed  Outcome: Improving     Problem: Behavioral Health Plan of Care  Goal: Adheres to Safety Considerations for Self and Others  Outcome: Improving  Intervention: Develop and Maintain Individualized Safety Plan  Recent Flowsheet Documentation  Taken 1/7/2022 1100 by Jacy Kerr RN  Safety Measures: safety rounds completed  Goal: Optimized Coping Skills in Response to Life Stressors  Outcome: Improving     Problem: Sleep Disturbance  Goal: Adequate Sleep/Rest  Outcome: Improving

## 2022-01-07 NOTE — PROGRESS NOTES
01/07/22 0915   Engagement   Intervention Group   Topic Detail OT: Education on cognitive wellness and interactive social activity (Scattegories) to increase concentration, focus, attention to task/detail, memory, creative thinking, engagement in healthy distractions, and social engagement   Attendance Did not attend
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
## 2022-01-07 NOTE — PROGRESS NOTES
Occupational Therapy   [facility]      Patient was introduced to the role of occupational therapy and oriented to the process of occupational therapy services on the unit, including function of groups. Patient was given the Occupational Therapy Questionnaire to complete. Patient response: guarded, irritable, paranoid, dismissive, and accepted OT questionnaire but reported she would not complete it or attend any groups. OT will follow up with assessment and address any questions, needs, or concerns.    Therapist: Lisa Mclaughlin, OT  1/7/2022

## 2022-01-07 NOTE — PLAN OF CARE
Problem: Suicidal Behavior  Goal: Suicidal Behavior is Absent or Managed  1/7/2022 1755 by Jacy Kerr RN  Outcome: Improving  1/7/2022 1210 by Jacy Kerr RN  Outcome: Improving     Problem: Behavioral Health Plan of Care  Goal: Plan of Care Review  Outcome: Improving  Flowsheets (Taken 1/7/2022 1100)  Plan of Care Reviewed With: patient  Patient Agreement with Plan of Care: unable to participate  Goal: Patient-Specific Goal (Individualization)  Outcome: Improving  Note:     Goal: Adheres to Safety Considerations for Self and Others  Outcome: Improving  Intervention: Develop and Maintain Individualized Safety Plan  Recent Flowsheet Documentation  Taken 1/7/2022 1100 by Jacy Kerr RN  Safety Measures: safety rounds completed  Goal: Optimized Coping Skills in Response to Life Stressors  Outcome: Improving     Problem: Sleep Disturbance  Goal: Adequate Sleep/Rest  Outcome: Improving
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
## 2022-01-08 PROBLEM — R45.1 AGITATION: Status: ACTIVE | Noted: 2022-01-06

## 2022-01-08 PROBLEM — F15.10 AMPHETAMINE ABUSE (H): Status: ACTIVE | Noted: 2022-01-06

## 2022-01-08 PROBLEM — F31.2 BIPOLAR AFFECTIVE DISORDER, CURRENTLY MANIC, SEVERE, WITH PSYCHOTIC FEATURES (H): Status: ACTIVE | Noted: 2022-01-06

## 2022-01-08 LAB
ALBUMIN SERPL-MCNC: 3.2 G/DL (ref 3.5–5)
ALP SERPL-CCNC: 82 U/L (ref 45–120)
ALT SERPL W P-5'-P-CCNC: 22 U/L (ref 0–45)
AST SERPL W P-5'-P-CCNC: 19 U/L (ref 0–40)
BILIRUB DIRECT SERPL-MCNC: 0.2 MG/DL
BILIRUB SERPL-MCNC: 0.4 MG/DL (ref 0–1)
PROT SERPL-MCNC: 5.8 G/DL (ref 6–8)

## 2022-01-08 PROCEDURE — 250N000013 HC RX MED GY IP 250 OP 250 PS 637: Performed by: PSYCHIATRY & NEUROLOGY

## 2022-01-08 PROCEDURE — 128N000001 HC R&B CD/MH ADULT

## 2022-01-08 RX ADMIN — OLANZAPINE 10 MG: 10 TABLET, FILM COATED ORAL at 20:20

## 2022-01-08 RX ADMIN — NICOTINE POLACRILEX 4 MG: 4 GUM, CHEWING ORAL at 20:21

## 2022-01-08 RX ADMIN — THERA TABS 1 TABLET: TAB at 08:33

## 2022-01-08 RX ADMIN — FLUTICASONE FUROATE AND VILANTEROL TRIFENATATE 1 PUFF: 100; 25 POWDER RESPIRATORY (INHALATION) at 08:35

## 2022-01-08 RX ADMIN — Medication 100 MG: at 08:33

## 2022-01-08 RX ADMIN — NICOTINE POLACRILEX 4 MG: 4 GUM, CHEWING ORAL at 18:28

## 2022-01-08 RX ADMIN — NICOTINE POLACRILEX 4 MG: 4 GUM, CHEWING ORAL at 13:06

## 2022-01-08 RX ADMIN — HYDROXYZINE HYDROCHLORIDE 50 MG: 25 TABLET ORAL at 20:21

## 2022-01-08 RX ADMIN — FOLIC ACID 1 MG: 1 TABLET ORAL at 08:33

## 2022-01-08 RX ADMIN — LITHIUM CARBONATE 300 MG: 300 TABLET, FILM COATED, EXTENDED RELEASE ORAL at 20:21

## 2022-01-08 RX ADMIN — LITHIUM CARBONATE 300 MG: 300 TABLET, FILM COATED, EXTENDED RELEASE ORAL at 08:33

## 2022-01-08 ASSESSMENT — ACTIVITIES OF DAILY LIVING (ADL)
ORAL_HYGIENE: INDEPENDENT
DRESS: INDEPENDENT
HYGIENE/GROOMING: INDEPENDENT
ORAL_HYGIENE: INDEPENDENT
DRESS: INDEPENDENT
LAUNDRY: WITH SUPERVISION
LAUNDRY: WITH SUPERVISION
HYGIENE/GROOMING: INDEPENDENT

## 2022-01-08 ASSESSMENT — MIFFLIN-ST. JEOR: SCORE: 1240.05

## 2022-01-08 NOTE — PROGRESS NOTES
01/08/22 1057   Engagement   Intervention Group   Topic Detail Bridge to self-confidence game for improving feelings of self-worth, healthy leisure, coping with symptoms through distraction, and insight development.   Attendance Did not attend   Reason for Not Attending Refused

## 2022-01-08 NOTE — PROGRESS NOTES
"   01/08/22 1100   Occupational Therapy Psychosocial Assessment   Assessment Type Interview Form;Chart Review   Reason for refusal Elevated Symptoms;Other (see comments)  (Aggitation)   Prior Level of Function   People in home spouse;child(spike), dependent  ( and 16 yo daughter)   Current Living Arrangements house;other (see comments)  (Currently staying with her brother,using substances together)   Instrumental Activities of Daily Living (IADL)   Previous Responsibilities meal prep;housekeeping;laundry;shopping;yardwork;finances;driving;   Therapy Assessment   Patient Presentation Patient is a 52 yo female who was brought in by EMS for sypmtoms of psychosis. She lives in [place], [place] with her  and 16 yo daughter. She drove to [place] because voices were telling her to, believing that her radio is giving her personal messages. 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 Per daughter, patient becomes aggresive and combative when psychotic. Current stressors include the recent loss of her mother and her job of several years. Per chart, patient is staying with her brother who uses substances. Patient's UA was positive for amphetamines. Patient is experiencing an exacerbation of mental health symptoms due to a lack of healthy coping skills for grief management. Patient would benefit from social groups and groups that provide education on emotion regulation.    Patient-identified areas of success Time spent with family or friends;Having a satisfying routine;Concentrating on own tasks;Living independently;Taking care of the place of living;Managing own finances;Managing basic needs (food, medicine, sleep);Learning new things   Healthy Leisure Activities \"cookouts, talking\"   Patient-identified areas of difficulty Difficulty concentrating;Memory problems;Motivation/mood;Bothered by lights or sounds in the environment   Patient-identified sources of support " "Safe place to live;Family, friends or caregivers to help when needed;Pets;Belief in self and abilities   Patient-identified emotional, physical or mental health concerns Patient chose not to answer. Per chart, patient does not think her admission was appropriate/necessary.   Patient-identified coping stategies for these concerns \"sleep\"   Patient-identified stressors or changes in the past year \"mom , lost my job, \"   Patient-identified values \"I am alive\"   Patient's goal for the future \"not to be sad anymore\"   Patient-identified community resources Other (see comments)  (\"Family\", has a psychiatrist)   Patient's goals to work on with OT Learn ways to stay well and avoid coming to the hospital;Share own thoughts and feelings;Relax and enjoy oneself;Improve own relationships with others   Clinical Impression   Patient Personal Strengths independent living skills   Pt appears to have the following barriers/limitations Limited insight into mental health symptoms;Unstable living arrangements;Poorly managed mental health symptoms;Limited knowledge   Limited knowledge details Healthy coping skills for managing mental health symptoms and limited insight to exacerbation of mental health symptoms   Patient's barriers/limitations contribute to Exacerbation of mental health symptoms;Relapse of substance abuse;Lack of cooperation with care team;Lack of participation in therapeutic programming   Planned Interventions Encourage group attendance;Identify and develop coping strategies;Engage in activities for insight development;Encourage engagement in meaningful activities;Encourage improved social interaction skills;Improve self-management skills   Risk & Benefits of therapy have been explained care plan/treatment goals reviewed;participants included;patient  (upon care plan goal review, provided patient is appropriate)   Minutes Spent with Patient 0   Beh OT Plan for Next Session Patient will (1) respond to " therapist in an appropriate/respectful manner 3 times and (2) engage in 1 OT group this review period

## 2022-01-08 NOTE — PROGRESS NOTES
solative in room for most of shift. Irritable/angry due to learning that her 72hour hold will end on the 10th of Jan instead of tomorrow. Pt thinks someone decided to extend the hold. Endorsed anxiety 5/10 but denied pain and all other psych symptoms. Contracts for safety and medication complaint. Pt refused breakfast  and dinner but had lunch and no discomfort reported

## 2022-01-08 NOTE — PLAN OF CARE
Problem: Suicidal Behavior  Goal: Suicidal Behavior is Absent or Managed  Outcome: Improving     Problem: Behavioral Health Plan of Care  Goal: Plan of Care Review  Outcome: Improving  Flowsheets (Taken 1/8/2022 1000)  Plan of Care Reviewed With: patient  Patient Agreement with Plan of Care: agrees  Goal: Absence of New-Onset Illness or Injury  Outcome: Improving  Intervention: Identify and Manage Fall Risk  Recent Flowsheet Documentation  Taken 1/8/2022 1000 by Jacy Kerr, RN  Safety Measures: safety rounds completed     Problem: Sleep Disturbance  Goal: Adequate Sleep/Rest  Outcome: Improving

## 2022-01-08 NOTE — PROGRESS NOTES
Pt pleasant and cooperative with cares. Out for meals and was observed in the lounge interacting with peers and playing cards. Denied pain and all psych symptom and contracts for safety. Med complaint and no behavior reported.

## 2022-01-08 NOTE — PLAN OF CARE
Problem: Suicidal Behavior  Goal: Suicidal Behavior is Absent or Managed  1/8/2022 0058 by Monie Wilhelm, RN  Outcome: Improving     Problem: Sleep Disturbance  Goal: Adequate Sleep/Rest  1/8/2022 0058 by Monie Wilhelm, RN  Outcome: Improving     Pt voices no c/o pain. Pt slept greater than 7 hours. Safety checks completed q 15 minutes per unit policy. Pt on assault, suicide and withdrawal precautions, no concerns noted. CIWA assessments at 0000 and 0400 were deferred as patient sleeping,    Monie Wilhelm RN

## 2022-01-08 NOTE — PLAN OF CARE
Problem: Suicidal Behavior  Goal: Suicidal Behavior is Absent or Managed  Outcome: Improving     Problem: Sleep Disturbance  Goal: Adequate Sleep/Rest  Outcome: Improving     Pt resting quietly in her room when writer arrived to shift. Pt was encouraged to come out for snack and she did, and requested additional snack which was provided as she has not been consistently eating her meals. Pt very fixated on her 72 hour hold, writer brought patient over to a calender in the dining room and explained how the hold works. Patient upset that she will have to pay for the weekend when the hold expires early Monday morning. Pt did not have a copy of her hold paperwork with her so writer made a copy from her chart. Pt very irritable regarding this. Later out coloring in dining room.     Pt requesting order for nicotine patch, note left for provider. Pt also complaining about lump under her skin on her right arm near her elbow from old IV. Writer also left note to provider in regards.     Pt later approached writer and asked writer to document for provider that her new medications are working well and she does not feel her mood is as labile as when she came in.    Safety checks completed q 15 minutes per unit policy. Pt on assault, suicide and withdrawal precautions, no concerns noted. CIWA 0 at 2030. VSS.     Monie Wilhelm RN

## 2022-01-09 PROCEDURE — 250N000013 HC RX MED GY IP 250 OP 250 PS 637: Performed by: PSYCHIATRY & NEUROLOGY

## 2022-01-09 PROCEDURE — 128N000001 HC R&B CD/MH ADULT

## 2022-01-09 RX ADMIN — FLUTICASONE FUROATE AND VILANTEROL TRIFENATATE 1 PUFF: 100; 25 POWDER RESPIRATORY (INHALATION) at 08:26

## 2022-01-09 RX ADMIN — THERA TABS 1 TABLET: TAB at 08:26

## 2022-01-09 RX ADMIN — NICOTINE POLACRILEX 4 MG: 4 GUM, CHEWING ORAL at 08:34

## 2022-01-09 RX ADMIN — NICOTINE POLACRILEX 4 MG: 4 GUM, CHEWING ORAL at 18:20

## 2022-01-09 RX ADMIN — Medication 100 MG: at 08:26

## 2022-01-09 RX ADMIN — LITHIUM CARBONATE 300 MG: 300 TABLET, FILM COATED, EXTENDED RELEASE ORAL at 08:26

## 2022-01-09 RX ADMIN — FOLIC ACID 1 MG: 1 TABLET ORAL at 08:25

## 2022-01-09 RX ADMIN — OLANZAPINE 10 MG: 10 TABLET, FILM COATED ORAL at 20:28

## 2022-01-09 RX ADMIN — LITHIUM CARBONATE 300 MG: 300 TABLET, FILM COATED, EXTENDED RELEASE ORAL at 20:28

## 2022-01-09 RX ADMIN — HYDROXYZINE HYDROCHLORIDE 50 MG: 25 TABLET ORAL at 20:27

## 2022-01-09 ASSESSMENT — ACTIVITIES OF DAILY LIVING (ADL)
LAUNDRY: WITH SUPERVISION
ORAL_HYGIENE: INDEPENDENT
ORAL_HYGIENE: INDEPENDENT
LAUNDRY: WITH SUPERVISION
DRESS: INDEPENDENT
HYGIENE/GROOMING: INDEPENDENT
HYGIENE/GROOMING: INDEPENDENT
DRESS: INDEPENDENT

## 2022-01-09 NOTE — PLAN OF CARE
Problem: Behavioral Health Plan of Care  Goal: Adheres to Safety Considerations for Self and Others  Outcome: Improving     Problem: Behavioral Health Plan of Care  Goal: Optimized Coping Skills in Response to Life Stressors  Outcome: Improving     Pt pleasant and cooperative. Pt. Visible in the milieu and engaged with peers and watching TV. Pt. Ate HS snack. Pt. Asking about potential discharge after her 72 hour hold expires. Pt. Encouraged to talk with .  PRN Nicotine gum x1, Hydroxyzine 50mg for anxiety with effectiveness.

## 2022-01-09 NOTE — PROGRESS NOTES
01/09/22 1050   Engagement   Intervention Group   Topic Detail OT: physical wellness activity for bone strengthening, reducing pain, stretching, relaxation, and coping wtih sx through distraction.   Attendance Did not attend   Reason for Not Attending Refused

## 2022-01-09 NOTE — PLAN OF CARE
Problem: Suicidal Behavior  Goal: Suicidal Behavior is Absent or Managed  Outcome: Improving     Problem: Behavioral Health Plan of Care  Goal: Plan of Care Review  Outcome: Improving  Flowsheets  Taken 1/9/2022 1700  Plan of Care Reviewed With: patient  Patient Agreement with Plan of Care: agrees  Taken 1/9/2022 1026  Plan of Care Reviewed With: patient  Patient Agreement with Plan of Care: agrees  Goal: Optimized Coping Skills in Response to Life Stressors  Outcome: Improving     Problem: Sleep Disturbance  Goal: Adequate Sleep/Rest  Outcome: Improving

## 2022-01-10 VITALS
WEIGHT: 145.9 LBS | SYSTOLIC BLOOD PRESSURE: 128 MMHG | RESPIRATION RATE: 18 BRPM | HEIGHT: 62 IN | BODY MASS INDEX: 26.85 KG/M2 | HEART RATE: 105 BPM | DIASTOLIC BLOOD PRESSURE: 65 MMHG | TEMPERATURE: 97.4 F | OXYGEN SATURATION: 99 %

## 2022-01-10 PROCEDURE — 250N000013 HC RX MED GY IP 250 OP 250 PS 637: Performed by: PSYCHIATRY & NEUROLOGY

## 2022-01-10 PROCEDURE — 99239 HOSP IP/OBS DSCHRG MGMT >30: CPT | Performed by: PSYCHIATRY & NEUROLOGY

## 2022-01-10 RX ORDER — HYDROXYZINE HYDROCHLORIDE 50 MG/1
50 TABLET, FILM COATED ORAL EVERY 6 HOURS PRN
Qty: 30 TABLET | Refills: 0 | Status: SHIPPED | OUTPATIENT
Start: 2022-01-10 | End: 2022-11-06

## 2022-01-10 RX ORDER — MULTIVITAMIN,THERAPEUTIC
1 TABLET ORAL DAILY
COMMUNITY
Start: 2022-01-11 | End: 2022-11-06

## 2022-01-10 RX ORDER — OLANZAPINE 10 MG/1
10 TABLET ORAL AT BEDTIME
Qty: 30 TABLET | Refills: 0 | Status: ON HOLD | OUTPATIENT
Start: 2022-01-10 | End: 2022-05-26

## 2022-01-10 RX ORDER — LITHIUM CARBONATE 300 MG/1
300 TABLET, FILM COATED, EXTENDED RELEASE ORAL EVERY 12 HOURS
Qty: 60 TABLET | Refills: 0 | Status: ON HOLD | OUTPATIENT
Start: 2022-01-10 | End: 2022-05-26

## 2022-01-10 RX ORDER — ALBUTEROL SULFATE 90 UG/1
2 AEROSOL, METERED RESPIRATORY (INHALATION) EVERY 6 HOURS PRN
Qty: 18 G | Refills: 0 | Status: SHIPPED | OUTPATIENT
Start: 2022-01-10 | End: 2022-11-06

## 2022-01-10 RX ADMIN — THERA TABS 1 TABLET: TAB at 08:41

## 2022-01-10 RX ADMIN — NICOTINE POLACRILEX 4 MG: 4 GUM, CHEWING ORAL at 06:45

## 2022-01-10 RX ADMIN — NICOTINE POLACRILEX 4 MG: 4 GUM, CHEWING ORAL at 15:00

## 2022-01-10 RX ADMIN — FLUTICASONE FUROATE AND VILANTEROL TRIFENATATE 1 PUFF: 100; 25 POWDER RESPIRATORY (INHALATION) at 08:42

## 2022-01-10 RX ADMIN — LITHIUM CARBONATE 300 MG: 300 TABLET, FILM COATED, EXTENDED RELEASE ORAL at 08:41

## 2022-01-10 RX ADMIN — FOLIC ACID 1 MG: 1 TABLET ORAL at 08:41

## 2022-01-10 RX ADMIN — Medication 100 MG: at 08:41

## 2022-01-10 ASSESSMENT — ACTIVITIES OF DAILY LIVING (ADL)
ORAL_HYGIENE: INDEPENDENT
LAUNDRY: WITH SUPERVISION
DRESS: INDEPENDENT
HYGIENE/GROOMING: INDEPENDENT

## 2022-01-10 NOTE — PROGRESS NOTES
01/10/22 1438   Engagement   Intervention Group   Topic Coping with stress;Symptom management   Topic Detail paper snowflakes for creative expression, coping skill development, social engagement   Attendance Did not attend

## 2022-01-10 NOTE — PLAN OF CARE
Problem: Behavioral Health Plan of Care  Goal: Adheres to Safety Considerations for Self and Others  Outcome: Improving  Intervention: Develop and Maintain Individualized Safety Plan  Recent Flowsheet Documentation  Taken 1/10/2022 1100 by Radha Aaron RN  Safety Measures: safety rounds completed  Goal: Absence of New-Onset Illness or Injury  Outcome: Improving  Intervention: Identify and Manage Fall Risk  Recent Flowsheet Documentation  Taken 1/10/2022 1100 by Radha Aaron RN  Safety Measures: safety rounds completed    Pt alert and oriented X3. Thought process was linear and organized. Speech is appropriate and clear. Patient's verbal reports match affect and behavior.    Pain 0/10, anxiety 3/10, Depression 0/10, Denies SI and HI, Denies A/VH. Contracts for safety.   Appetite is good eating all of meals. Bowels are moving normally. VSS.    Pt was observed calm and in lounge/bullock/own room for the majority of the shift. Pt did not participate in groups. Pt was med compliant. Pt sleeps well.     No other behaviors noted this shift.  Pt remains on assault, suicide, withdrawal precautions for safety.      Uses no ambulatory aids.      Radha Aaron RN, CMSRN  [facility]

## 2022-01-10 NOTE — PLAN OF CARE
BEHAVIORAL TEAM DISCUSSION    Participants: Provider- BC, Occupational Therapist- , Pharmacy- AR, - MW, Nurse- EO  Progress: Patient is progressing towards discharge home with outpatient supports and continuing to get back on her medication.  Anticipated length of stay: 5-7 days  Continued Stay Criteria/Rationale: Symptom Stabilization  Medical/Physical: None reported  Precautions:   Behavioral Orders   Procedures    Assault precautions    Code 1 - Restrict to Unit    Routine Programming     As clinically indicated    Status 15     Every 15 minutes.    Suicide precautions     Patients on Suicide Precautions should have a Combination Diet ordered that includes a Diet selection(s) AND a Behavioral Tray selection for Safe Tray - with utensils, or Safe Tray - NO utensils      Withdrawal precautions     Plan: Plan is to assess patient and discuss outpatient supports and if appropriate when it's appropriate for patient to discharge home.  Rationale for change in precautions or plan: None reported

## 2022-01-10 NOTE — PLAN OF CARE
Discharge plan or goal: Home to Brother's with Outpatient MH Supports    Today's Plan:  was contacted by [facility] pre-petitioner stating that he did not have time to screen the patient on Friday, prior to when the patient's hold .  contacted the provider and gave her this information and she asked  to contact patient's brother and confirm that she has a place to discharge to.  called patient's brother, Santo/Milind, and confirmed that he thinks patient has improved and he feels comfortable with her discharging to him home today and can come pick her up from the hospital.  met with patient and charge nurse together and notified her that her hold has  and told her that the provider plans to see her before she leaves to prescribe discharge medications. Patient agreed that she would stay until this point and signed an YESI for [facility] for a psychiatry appointment.  called [facility] and scheduled a telehealth appointment for psychiatry for patient on  via telehealth.  gave patient a resource for Rule 25 assessments in [facility] and other mental health resources too.     Pt has the following outpatient appointment(s) scheduled: Patient has a psychiatry appointment on  at 7:30am via Telehealth.    Pt has the following professional community support(s): Patient was given the [facility] Crisis number and Rule 25 assessment information.    Legal Status: Voluntary    Diagnosis/Procedure:   Patient Active Problem List   Diagnosis     Psychosis (H)     Bipolar affective disorder, currently manic, severe, with psychotic features (H)     Amphetamine abuse (H)     Agitation       Care Rounds Attendance:   [facility]   RN   Charge RN   OT/TR  MD/MARIAN Terrell [facility], 1/10/2022, 3:35 PM

## 2022-01-10 NOTE — PLAN OF CARE
Problem: Suicidal Behavior  Goal: Suicidal Behavior is Absent or Managed  Outcome: Improving     Problem: Behavioral Health Plan of Care  Goal: Absence of New-Onset Illness or Injury  Outcome: Improving    Pt continues on assault, suicide and  withdrawal precautions, with no acting out or symptoms related to precautions.  Pt calm, cooperative and medications compliant.  Denied all psych symptoms except for very slight anxiety due to expecting/ wanting to be discharged tomorrow.  Received prn atarax 25 mg with relief.  No signs of withdrawal.

## 2022-01-10 NOTE — PROGRESS NOTES
Pt signed papers, got her belongings, paperwork and meds.  Left before she got her meds b/c her brother had a dentist apt and her was driving her.  She promised to return to get her meds. [facility] said meds won't be available until after 4pm

## 2022-01-10 NOTE — DISCHARGE INSTRUCTIONS
Behavioral Discharge Planning and Instructions    Summary: You were admitted on 1/5/2022  due to Substance Induced Psychosis.  You were treated by Dr. Shaina Sanchez and discharged on 01/10/2022 from [facility] to Home    Main Diagnosis: Psychosis    Health Care Follow-up:   Appointment Date/Time: Wednesday, February 2nd at 7:30am, please contact clinic number [phone] to provide an e-mail address for the appointment   Psychiatrist/Primary Care Giver: Johnathon Weir [facility] Phone Number: [phone]     Rule 25 Assessment:  A Rule 25 (call [phone] for an appointment) is a meeting between a medical professional and a person that may have a substance abuse or chemical dependency problem. The evaluation occurs in [facility] and assesses your overall chemical health.    Attend all scheduled appointments with your outpatient providers. Call at least 24 hours in advance if you need to reschedule an appointment to ensure continued access to your outpatient providers.     Major Treatments, Procedures and Findings:  You were provided with: a psychiatric assessment, assessed for medical stability, medication evaluation and/or management, group therapy and milieu management    Symptoms to Report: feeling more aggressive, increased confusion, losing more sleep, mood getting worse or thoughts of suicide    Early warning signs can include: increased depression or anxiety sleep disturbances increased thoughts or behaviors of suicide or self-harm  increased unusual thinking, such as paranoia or hearing voices    Safety and Wellness:  Take all medicines as directed.  Make no changes unless your doctor suggests them.      Follow treatment recommendations.  Refrain from alcohol and non-prescribed drugs.  Ask your support system to help you reduce your access to items that could harm yourself or others. Items could include:  Firearms  Medicines (both prescribed and over-the-counter)  Knives  "and other sharp objects  Ropes and like materials  Car keys  If there is a concern for safety, call 911. If there is a concern for safety, call 911.    Resources:   Crisis Intervention: 466.464.5703 or 176-293-6529 (TTY: 228.331.8302).  Call anytime for help.  National [place] [employer] Mental [employer]ness (www.[place].ally.org): 880.629.9771 or 742-422-1205.  [facility] Crisis Response [phone]  [facility] Crisis (COPE) Response - Adult [phone]  [facility] Crisis Response - Adult [phone]  Text 4 Life: txt \"LIFE\" to 30791 for immediate support and crisis intervention  Crisis text line: Text \"[place]\" to 256858. Free, confidential, 24/7.    General Medication Instructions:   See your medication sheet(s) for instructions.   Take all medicines as directed.  Make no changes unless your doctor suggests them.   Go to all your doctor visits.  Be sure to have all your required lab tests. This way, your medicines can be refilled on time.  Do not use any drugs not prescribed by your doctor.  Avoid alcohol.    Advance Directives:   Scanned document on file with [employer]? No scanned doc  Is document scanned? Pt states no documents  Honoring Choices Your Rights Handout: Informed and given  Was more information offered? Pt declined    The Treatment team has appreciated the opportunity to work with you. If you have any questions or concerns about your recent admission, you can contact the unit which can receive your call 24 hours a day, 7 days a week. They will be able to get in touch with a Provider if needed. The unit number is [phone] .  "

## 2022-01-10 NOTE — PROGRESS NOTES
Patient appeared to rest well during noc shift, slept > 7 hours, no reports of pain or discomfort, no PRN's adm or requested, no precautionary behaviors noted or reported, 15 minute safety checks performed per protocol, will continue to monitor.
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
## 2022-01-10 NOTE — PLAN OF CARE
Occupational Therapy Discharge Note    Patient Name:  Christina Vásquez      Problem: Relapse Prevention  Goal: Engage in OT Group  Description: Pt will engage in 1 OT group activities that support recovery this review period.   Outcome: Completed     Problem: Social Interaction  Goal: Communicate Appropriately  Description: Patient will respond to therapist in an appropriate/respectful manner 3 times this review period.   Outcome: Completed       D: Refer to daily doc flowsheets for details of progress toward goals.    A: Improvement seen in some MH sx's, per brother and chart review.    P: Patient discharging to home with his brother with outpatient supports.  Discontinue OT Care Plan goals and interventions.    Radha Riggs OTR/L  Date: 1/10/2022  Time: 4:11 PM

## 2022-01-10 NOTE — PLAN OF CARE
Problem: Suicidal Behavior  Goal: Suicidal Behavior is Absent or Managed  Outcome: Improving     Problem: Behavioral Health Plan of Care  Goal: Adheres to Safety Considerations for Self and Others  Outcome: Improving     Problem: Sleep Disturbance  Goal: Adequate Sleep/Rest  Outcome: Improving
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
## 2022-01-12 ENCOUNTER — TELEPHONE (OUTPATIENT)
Dept: PHARMACY | Facility: OTHER | Age: 52
End: 2022-01-12
Payer: COMMERCIAL

## 2022-01-12 NOTE — TELEPHONE ENCOUNTER
MTM referral from: Transitions of Care (recent hospital discharge or ED visit)    MTM referral outreach attempt #2 on January 12, 2022 at 11:08 AM      Outcome: Patient not reachable after several attempts, will route to MTM Pharmacist/Provider as an FYI.  [facility] scheduling number is [phone].  Thank you for the referral.    Edwin Partida, MT coordinator

## 2022-05-23 ENCOUNTER — HOSPITAL ENCOUNTER (INPATIENT)
Facility: CLINIC | Age: 52
LOS: 1 days | Discharge: HOME | End: 2022-05-26
Attending: EMERGENCY MEDICINE | Admitting: INTERNAL MEDICINE
Payer: MEDICAID

## 2022-05-23 DIAGNOSIS — F31.2 BIPOLAR AFFECTIVE DISORDER, CURRENTLY MANIC, SEVERE, WITH PSYCHOTIC FEATURES (H): ICD-10-CM

## 2022-05-23 DIAGNOSIS — F29 PSYCHOSIS, UNSPECIFIED PSYCHOSIS TYPE (H): ICD-10-CM

## 2022-05-23 LAB
ANION GAP SERPL CALCULATED.3IONS-SCNC: 7 MMOL/L (ref 3–14)
BASOPHILS # BLD AUTO: 0 10E3/UL (ref 0–0.2)
BASOPHILS NFR BLD AUTO: 0 %
BUN SERPL-MCNC: 14 MG/DL (ref 7–30)
CALCIUM SERPL-MCNC: 8.9 MG/DL (ref 8.5–10.1)
CHLORIDE BLD-SCNC: 109 MMOL/L (ref 94–109)
CO2 SERPL-SCNC: 24 MMOL/L (ref 20–32)
CREAT SERPL-MCNC: 0.68 MG/DL (ref 0.52–1.04)
EOSINOPHIL # BLD AUTO: 0.1 10E3/UL (ref 0–0.7)
EOSINOPHIL NFR BLD AUTO: 2 %
ERYTHROCYTE [DISTWIDTH] IN BLOOD BY AUTOMATED COUNT: 12.1 % (ref 10–15)
GFR SERPL CREATININE-BSD FRML MDRD: >90 ML/MIN/1.73M2
GLUCOSE BLD-MCNC: 80 MG/DL (ref 70–99)
HCG SERPL QL: NEGATIVE
HCT VFR BLD AUTO: 40 % (ref 35–47)
HGB BLD-MCNC: 13.5 G/DL (ref 11.7–15.7)
IMM GRANULOCYTES # BLD: 0 10E3/UL
IMM GRANULOCYTES NFR BLD: 0 %
LYMPHOCYTES # BLD AUTO: 2.2 10E3/UL (ref 0.8–5.3)
LYMPHOCYTES NFR BLD AUTO: 38 %
MCH RBC QN AUTO: 31.5 PG (ref 26.5–33)
MCHC RBC AUTO-ENTMCNC: 33.8 G/DL (ref 31.5–36.5)
MCV RBC AUTO: 93 FL (ref 78–100)
MONOCYTES # BLD AUTO: 0.4 10E3/UL (ref 0–1.3)
MONOCYTES NFR BLD AUTO: 7 %
NEUTROPHILS # BLD AUTO: 3.1 10E3/UL (ref 1.6–8.3)
NEUTROPHILS NFR BLD AUTO: 53 %
NRBC # BLD AUTO: 0 10E3/UL
NRBC BLD AUTO-RTO: 0 /100
PLATELET # BLD AUTO: 281 10E3/UL (ref 150–450)
POTASSIUM BLD-SCNC: 3.8 MMOL/L (ref 3.4–5.3)
RBC # BLD AUTO: 4.29 10E6/UL (ref 3.8–5.2)
SARS-COV-2 RNA RESP QL NAA+PROBE: POSITIVE
SODIUM SERPL-SCNC: 140 MMOL/L (ref 133–144)
WBC # BLD AUTO: 5.9 10E3/UL (ref 4–11)

## 2022-05-23 PROCEDURE — 80053 COMPREHEN METABOLIC PANEL: CPT | Performed by: EMERGENCY MEDICINE

## 2022-05-23 PROCEDURE — 250N000013 HC RX MED GY IP 250 OP 250 PS 637: Performed by: EMERGENCY MEDICINE

## 2022-05-23 PROCEDURE — U0005 INFEC AGEN DETEC AMPLI PROBE: HCPCS | Performed by: EMERGENCY MEDICINE

## 2022-05-23 PROCEDURE — 82040 ASSAY OF SERUM ALBUMIN: CPT | Performed by: INTERNAL MEDICINE

## 2022-05-23 PROCEDURE — C9803 HOPD COVID-19 SPEC COLLECT: HCPCS

## 2022-05-23 PROCEDURE — 85025 COMPLETE CBC W/AUTO DIFF WBC: CPT | Performed by: EMERGENCY MEDICINE

## 2022-05-23 PROCEDURE — 90791 PSYCH DIAGNOSTIC EVALUATION: CPT

## 2022-05-23 PROCEDURE — 84703 CHORIONIC GONADOTROPIN ASSAY: CPT | Performed by: EMERGENCY MEDICINE

## 2022-05-23 PROCEDURE — 99220 PR INITIAL OBSERVATION CARE,LEVEL III: CPT | Performed by: INTERNAL MEDICINE

## 2022-05-23 PROCEDURE — 36415 COLL VENOUS BLD VENIPUNCTURE: CPT | Performed by: EMERGENCY MEDICINE

## 2022-05-23 PROCEDURE — 86140 C-REACTIVE PROTEIN: CPT | Performed by: INTERNAL MEDICINE

## 2022-05-23 PROCEDURE — 99285 EMERGENCY DEPT VISIT HI MDM: CPT | Mod: 25

## 2022-05-23 RX ORDER — OLANZAPINE 10 MG/1
10 TABLET ORAL 2 TIMES DAILY
Status: DISCONTINUED | OUTPATIENT
Start: 2022-05-23 | End: 2022-05-26 | Stop reason: HOSPADM

## 2022-05-23 RX ORDER — OLANZAPINE 5 MG/1
10 TABLET, ORALLY DISINTEGRATING ORAL AT BEDTIME
Status: DISCONTINUED | OUTPATIENT
Start: 2022-05-23 | End: 2022-05-24

## 2022-05-23 RX ADMIN — OLANZAPINE 10 MG: 10 TABLET, FILM COATED ORAL at 18:51

## 2022-05-23 NOTE — ED PROVIDER NOTES
"  History     Chief Complaint:  Altered Mental Status       HPI   Christina Vásquez is a 51 year old female who presents with and a health concern.  Patient reports that she moved here from [place] about a month ago.  She is staying with her brother.  Over this timeframe, she feels that her mind is not right.  She reports auditory and visual hallucinations.  She reports passive SI but no plan, stating that \"she wants to live\".  She has used meth in the past but is not sure when she last used it.  When asked if she has had fever or cough or vomiting or diarrhea or other medical concerns, she states yes but it was \"fake\".  History limited due to responding to internal stimuli during HPI.  Patient wants to ensure that it is documented that she should not be charged for her COVID swab or test as she does not believe in COVID.  Patient also reports that she has not been taking her medications because she was on lithium in the past and her \"car ran out of lithium\".    ROS:  Review of Systems  Limited due to psychosis.    Allergies:  Penicillins     Medications:    albuterol (PROAIR HFA/PROVENTIL HFA/VENTOLIN HFA) 108 (90 Base) MCG/ACT inhaler  fluticasone-vilanterol (BREO ELLIPTA) 100-25 MCG/INH inhaler  hydrOXYzine (ATARAX) 50 MG tablet  lithium ER (LITHOBID) 300 MG CR tablet  multivitamin, therapeutic (THERA-VIT) TABS tablet  nicotine polacrilex (NICORETTE) 2 MG gum  OLANZapine (ZYPREXA) 10 MG tablet        Past Medical History:    No past medical history on file.    Past Surgical History:    No past surgical history on file.     Family History:    family history is not on file.    Social History:     PCP: No Ref-Primary, Physician     Physical Exam     Patient Vitals for the past 24 hrs:   BP Temp Temp src Pulse Resp SpO2   05/23/22 2330 -- -- -- -- -- 98 %   05/23/22 2315 -- -- -- -- -- 98 %   05/23/22 2300 -- -- -- -- -- 99 %   05/23/22 1827 (!) 145/87 97.8  F (36.6  C) Oral 81 16 100 %        Physical Exam  VS: " Reviewed per above  HENT: normal speech  EYES: sclera anicteric  CV: Rate as noted, regular rhythm.   RESP: Effort normal. Breath sounds are normal bilaterally.  PSYCH: +AH/VH, + passive SI  NEURO: Alert, moving all extremities  MSK: No deformity of the extremities  SKIN: Warm and dry    Emergency Department Course       Laboratory:  Labs Ordered and Resulted from Time of ED Arrival to Time of ED Departure   COVID-19 VIRUS (CORONAVIRUS) BY PCR - Abnormal       Result Value    SARS CoV2 PCR Positive (*)    BASIC METABOLIC PANEL - Normal    Sodium 140      Potassium 3.8      Chloride 109      Carbon Dioxide (CO2) 24      Anion Gap 7      Urea Nitrogen 14      Creatinine 0.68      Calcium 8.9      Glucose 80      GFR Estimate >90     HCG QUALITATIVE PREGNANCY - Normal    hCG Serum Qualitative Negative     CBC WITH PLATELETS AND DIFFERENTIAL    WBC Count 5.9      RBC Count 4.29      Hemoglobin 13.5      Hematocrit 40.0      MCV 93      MCH 31.5      MCHC 33.8      RDW 12.1      Platelet Count 281      % Neutrophils 53      % Lymphocytes 38      % Monocytes 7      % Eosinophils 2      % Basophils 0      % Immature Granulocytes 0      NRBCs per 100 WBC 0      Absolute Neutrophils 3.1      Absolute Lymphocytes 2.2      Absolute Monocytes 0.4      Absolute Eosinophils 0.1      Absolute Basophils 0.0      Absolute Immature Granulocytes 0.0      Absolute NRBCs 0.0     HCG QUALITATIVE URINE      No orders to display     Labs Ordered and Resulted from Time of ED Arrival to Time of ED Departure   COVID-19 VIRUS (CORONAVIRUS) BY PCR - Abnormal       Result Value    SARS CoV2 PCR Positive (*)    BASIC METABOLIC PANEL - Normal    Sodium 140      Potassium 3.8      Chloride 109      Carbon Dioxide (CO2) 24      Anion Gap 7      Urea Nitrogen 14      Creatinine 0.68      Calcium 8.9      Glucose 80      GFR Estimate >90     HCG QUALITATIVE PREGNANCY - Normal    hCG Serum Qualitative Negative     CBC WITH PLATELETS AND DIFFERENTIAL     "WBC Count 5.9      RBC Count 4.29      Hemoglobin 13.5      Hematocrit 40.0      MCV 93      MCH 31.5      MCHC 33.8      RDW 12.1      Platelet Count 281      % Neutrophils 53      % Lymphocytes 38      % Monocytes 7      % Eosinophils 2      % Basophils 0      % Immature Granulocytes 0      NRBCs per 100 WBC 0      Absolute Neutrophils 3.1      Absolute Lymphocytes 2.2      Absolute Monocytes 0.4      Absolute Eosinophils 0.1      Absolute Basophils 0.0      Absolute Immature Granulocytes 0.0      Absolute NRBCs 0.0     HCG QUALITATIVE URINE       Emergency Department Course:             Reviewed:  I reviewed nursing notes, vitals and past medical history    Assessments:   I obtained history and examined the patient as noted above.    I rechecked the patient and explained findings.     Consults:   DEC    Interventions:  Medications   OLANZapine (zyPREXA) tablet 10 mg (10 mg Oral Given 5/23/22 1851)   OLANZapine zydis (zyPREXA) ODT tab 10 mg (has no administration in time range)      Medications   OLANZapine (zyPREXA) tablet 10 mg (10 mg Oral Given 5/23/22 1851)   OLANZapine zydis (zyPREXA) ODT tab 10 mg (has no administration in time range)     Disposition:  The patient was admitted to the hospital under the care of Dr. hoover.     Impression & Plan      Medical Decision Making:  Patient presents to the ER for evaluation of worsening auditory and visual hallucinations.  On arrival vital signs are reassuring.  On exam she appears to be responding to internal stimuli and has evidence of psychosis.  I spoke with her daughter, with patient's permission, who informed me that patient is \"the worst she has ever been\" with respect to her mental health.  Patient is not been taking any of her psychiatric medications for bipolar/schizophrenia.  DEC evaluated patient and also spoke with patient's daughter.  Recommendation was for inpatient psychiatric hospitalization but unfortunately patient is incidentally COVID-positive.  " She will need to be admitted medically for psychiatric consultation.  Fortunately no respiratory symptoms related to this incidental COVID-positive test.  Patient was given oral Zyprexa to help with her psychosis and admitted to the hospitalist service for further management.    Diagnosis:    ICD-10-CM    1. Psychosis, unspecified psychosis type (H)  F29         Discharge Medications:  New Prescriptions    No medications on file        5/23/2022   Bradford Berger MD Lindenbaum, Elan, MD  05/24/22 0028

## 2022-05-23 NOTE — ED NOTES
Bed: ED30  Expected date: 5/23/22  Expected time: 6:12 PM  Means of arrival: Ambulance  Comments:  BV 1 51 F SI

## 2022-05-23 NOTE — ED TRIAGE NOTES
Arrives via EMS from home. From [place]. On psych hold for 5 days up there, a month ago. A&O X4, internal stimuli noted per EMS. States she hasn't slept for over a year.     HX of substance abuse. Possible use of substances in the last couple of day.     HTN en route otherwise VSS en route.

## 2022-05-24 LAB
ALBUMIN SERPL-MCNC: 3.5 G/DL (ref 3.4–5)
ALP SERPL-CCNC: 79 U/L (ref 40–150)
ALT SERPL W P-5'-P-CCNC: 66 U/L (ref 0–50)
AMPHETAMINES UR QL SCN: ABNORMAL
ANION GAP SERPL CALCULATED.3IONS-SCNC: 2 MMOL/L (ref 3–14)
AST SERPL W P-5'-P-CCNC: 34 U/L (ref 0–45)
BARBITURATES UR QL: ABNORMAL
BASOPHILS # BLD AUTO: 0 10E3/UL (ref 0–0.2)
BASOPHILS NFR BLD AUTO: 0 %
BENZODIAZ UR QL: ABNORMAL
BILIRUB SERPL-MCNC: 0.6 MG/DL (ref 0.2–1.3)
BUN SERPL-MCNC: 14 MG/DL (ref 7–30)
CALCIUM SERPL-MCNC: 9 MG/DL (ref 8.5–10.1)
CANNABINOIDS UR QL SCN: ABNORMAL
CHLORIDE BLD-SCNC: 109 MMOL/L (ref 94–109)
CO2 SERPL-SCNC: 25 MMOL/L (ref 20–32)
COCAINE UR QL: ABNORMAL
CREAT SERPL-MCNC: 0.73 MG/DL (ref 0.52–1.04)
CRP SERPL-MCNC: <2.9 MG/L (ref 0–8)
D DIMER PPP FEU-MCNC: 0.27 UG/ML FEU (ref 0–0.5)
EOSINOPHIL # BLD AUTO: 0.1 10E3/UL (ref 0–0.7)
EOSINOPHIL NFR BLD AUTO: 2 %
ERYTHROCYTE [DISTWIDTH] IN BLOOD BY AUTOMATED COUNT: 12.2 % (ref 10–15)
GFR SERPL CREATININE-BSD FRML MDRD: >90 ML/MIN/1.73M2
GLUCOSE BLD-MCNC: 75 MG/DL (ref 70–99)
HCG UR QL: NEGATIVE
HCT VFR BLD AUTO: 39.6 % (ref 35–47)
HGB BLD-MCNC: 13.3 G/DL (ref 11.7–15.7)
HOLD SPECIMEN: NORMAL
IMM GRANULOCYTES # BLD: 0 10E3/UL
IMM GRANULOCYTES NFR BLD: 0 %
INR PPP: 1.05 (ref 0.85–1.15)
LYMPHOCYTES # BLD AUTO: 1.3 10E3/UL (ref 0.8–5.3)
LYMPHOCYTES NFR BLD AUTO: 22 %
MCH RBC QN AUTO: 32 PG (ref 26.5–33)
MCHC RBC AUTO-ENTMCNC: 33.6 G/DL (ref 31.5–36.5)
MCV RBC AUTO: 95 FL (ref 78–100)
MONOCYTES # BLD AUTO: 0.5 10E3/UL (ref 0–1.3)
MONOCYTES NFR BLD AUTO: 8 %
NEUTROPHILS # BLD AUTO: 3.9 10E3/UL (ref 1.6–8.3)
NEUTROPHILS NFR BLD AUTO: 68 %
NRBC # BLD AUTO: 0 10E3/UL
NRBC BLD AUTO-RTO: 0 /100
OPIATES UR QL SCN: ABNORMAL
PLATELET # BLD AUTO: 280 10E3/UL (ref 150–450)
POTASSIUM BLD-SCNC: 3.8 MMOL/L (ref 3.4–5.3)
PROT SERPL-MCNC: 6.9 G/DL (ref 6.8–8.8)
RBC # BLD AUTO: 4.15 10E6/UL (ref 3.8–5.2)
SODIUM SERPL-SCNC: 136 MMOL/L (ref 133–144)
WBC # BLD AUTO: 5.8 10E3/UL (ref 4–11)

## 2022-05-24 PROCEDURE — 81025 URINE PREGNANCY TEST: CPT | Performed by: EMERGENCY MEDICINE

## 2022-05-24 PROCEDURE — 36415 COLL VENOUS BLD VENIPUNCTURE: CPT | Performed by: INTERNAL MEDICINE

## 2022-05-24 PROCEDURE — 99225 PR SUBSEQUENT OBSERVATION CARE,LEVEL II: CPT | Performed by: STUDENT IN AN ORGANIZED HEALTH CARE EDUCATION/TRAINING PROGRAM

## 2022-05-24 PROCEDURE — 99214 OFFICE O/P EST MOD 30 MIN: CPT | Mod: 95 | Performed by: PSYCHIATRY & NEUROLOGY

## 2022-05-24 PROCEDURE — 85025 COMPLETE CBC W/AUTO DIFF WBC: CPT | Performed by: INTERNAL MEDICINE

## 2022-05-24 PROCEDURE — 250N000011 HC RX IP 250 OP 636: Performed by: INTERNAL MEDICINE

## 2022-05-24 PROCEDURE — G0378 HOSPITAL OBSERVATION PER HR: HCPCS

## 2022-05-24 PROCEDURE — 80307 DRUG TEST PRSMV CHEM ANLYZR: CPT | Performed by: EMERGENCY MEDICINE

## 2022-05-24 PROCEDURE — 85379 FIBRIN DEGRADATION QUANT: CPT | Performed by: INTERNAL MEDICINE

## 2022-05-24 PROCEDURE — 85610 PROTHROMBIN TIME: CPT | Performed by: INTERNAL MEDICINE

## 2022-05-24 PROCEDURE — 96372 THER/PROPH/DIAG INJ SC/IM: CPT | Performed by: INTERNAL MEDICINE

## 2022-05-24 PROCEDURE — 250N000013 HC RX MED GY IP 250 OP 250 PS 637: Performed by: INTERNAL MEDICINE

## 2022-05-24 PROCEDURE — 250N000013 HC RX MED GY IP 250 OP 250 PS 637: Performed by: EMERGENCY MEDICINE

## 2022-05-24 RX ORDER — PROCHLORPERAZINE 25 MG
25 SUPPOSITORY, RECTAL RECTAL EVERY 12 HOURS PRN
Status: DISCONTINUED | OUTPATIENT
Start: 2022-05-24 | End: 2022-05-26 | Stop reason: HOSPADM

## 2022-05-24 RX ORDER — ONDANSETRON 4 MG/1
4 TABLET, ORALLY DISINTEGRATING ORAL EVERY 6 HOURS PRN
Status: DISCONTINUED | OUTPATIENT
Start: 2022-05-24 | End: 2022-05-26

## 2022-05-24 RX ORDER — ONDANSETRON 2 MG/ML
4 INJECTION INTRAMUSCULAR; INTRAVENOUS EVERY 6 HOURS PRN
Status: DISCONTINUED | OUTPATIENT
Start: 2022-05-24 | End: 2022-05-26 | Stop reason: HOSPADM

## 2022-05-24 RX ORDER — ACETAMINOPHEN 650 MG/1
650 SUPPOSITORY RECTAL EVERY 6 HOURS PRN
Status: DISCONTINUED | OUTPATIENT
Start: 2022-05-24 | End: 2022-05-26 | Stop reason: HOSPADM

## 2022-05-24 RX ORDER — ONDANSETRON 4 MG/1
4 TABLET, ORALLY DISINTEGRATING ORAL EVERY 6 HOURS PRN
Status: DISCONTINUED | OUTPATIENT
Start: 2022-05-24 | End: 2022-05-26 | Stop reason: HOSPADM

## 2022-05-24 RX ORDER — LIDOCAINE 40 MG/G
CREAM TOPICAL
Status: DISCONTINUED | OUTPATIENT
Start: 2022-05-24 | End: 2022-05-26 | Stop reason: HOSPADM

## 2022-05-24 RX ORDER — ENOXAPARIN SODIUM 100 MG/ML
40 INJECTION SUBCUTANEOUS EVERY 24 HOURS
Status: DISCONTINUED | OUTPATIENT
Start: 2022-05-24 | End: 2022-05-26 | Stop reason: HOSPADM

## 2022-05-24 RX ORDER — ALBUTEROL SULFATE 90 UG/1
2 AEROSOL, METERED RESPIRATORY (INHALATION) EVERY 6 HOURS PRN
Status: DISCONTINUED | OUTPATIENT
Start: 2022-05-24 | End: 2022-05-26 | Stop reason: HOSPADM

## 2022-05-24 RX ORDER — OLANZAPINE 5 MG/1
5 TABLET, ORALLY DISINTEGRATING ORAL EVERY 6 HOURS PRN
Status: DISCONTINUED | OUTPATIENT
Start: 2022-05-24 | End: 2022-05-26 | Stop reason: HOSPADM

## 2022-05-24 RX ORDER — HYDROXYZINE HYDROCHLORIDE 50 MG/1
50 TABLET, FILM COATED ORAL EVERY 6 HOURS PRN
Status: DISCONTINUED | OUTPATIENT
Start: 2022-05-24 | End: 2022-05-26 | Stop reason: HOSPADM

## 2022-05-24 RX ORDER — ONDANSETRON 2 MG/ML
4 INJECTION INTRAMUSCULAR; INTRAVENOUS EVERY 6 HOURS PRN
Status: DISCONTINUED | OUTPATIENT
Start: 2022-05-24 | End: 2022-05-26

## 2022-05-24 RX ORDER — HALOPERIDOL 5 MG/ML
2 INJECTION INTRAMUSCULAR EVERY 6 HOURS PRN
Status: DISCONTINUED | OUTPATIENT
Start: 2022-05-24 | End: 2022-05-26 | Stop reason: HOSPADM

## 2022-05-24 RX ORDER — ACETAMINOPHEN 325 MG/1
650 TABLET ORAL EVERY 6 HOURS PRN
Status: DISCONTINUED | OUTPATIENT
Start: 2022-05-24 | End: 2022-05-26 | Stop reason: HOSPADM

## 2022-05-24 RX ORDER — OLANZAPINE 10 MG/1
10 TABLET ORAL AT BEDTIME
Status: DISCONTINUED | OUTPATIENT
Start: 2022-05-24 | End: 2022-05-26 | Stop reason: HOSPADM

## 2022-05-24 RX ORDER — PROCHLORPERAZINE MALEATE 5 MG
10 TABLET ORAL EVERY 6 HOURS PRN
Status: DISCONTINUED | OUTPATIENT
Start: 2022-05-24 | End: 2022-05-26 | Stop reason: HOSPADM

## 2022-05-24 RX ADMIN — OLANZAPINE 10 MG: 10 TABLET, FILM COATED ORAL at 11:15

## 2022-05-24 RX ADMIN — OLANZAPINE 10 MG: 10 TABLET, FILM COATED ORAL at 19:23

## 2022-05-24 RX ADMIN — ENOXAPARIN SODIUM 40 MG: 40 INJECTION SUBCUTANEOUS at 11:16

## 2022-05-24 RX ADMIN — OLANZAPINE 10 MG: 10 TABLET, FILM COATED ORAL at 22:00

## 2022-05-24 NOTE — H&P
[facility]    Hospitalist Admission Note    Name: Christina Vásquez    MRN: [id number]  YOB: 1970    Age: 51 year old  Date of admission: 5/23/2022  Primary care provider: No Ref-Primary, Physician  Admitting physician : Indra Marcum M.D. ,M.B.A.       Brief summary of admission assessment/MDM:    Christina Vásquez is a 51 year old  female with a significant past medical history of bipolar disorder, schizophrenia, substance use disorder who is currently not taking her medications presents with hallucinations, delusions and suicidal ideation.  History is limited due to patient's psychosis and multiple sources used to obtain pertinent history.  Patient was evaluated in the emergency department and was seen by mental health counselor as well.  Unfortunately she tested positive for COVID and unable to transfer to inpatient mental health unit and hospitalist service was consulted to admit patient here for further care.      Assessment and Plan       #1.Psychosis likely schizophrenia -currently off her medications including lithium and Zyprexa and Atarax.  -- presents with hallucinations, delusions, and suicide ideation  -- assessed by mental health counselor in ED and was recommended inpatient Mental Health  transfer but unable to do due to positive COVID test   --Patient is currently somnolent following treatment with Zyprexa.  --Admit to observation status, suicidal precautions, psychiatry consultation, as needed Zydis  --Add drug tox screen  --Defer further disposition and medications to psychiatry team.       #2.COVID -19 infection   -- Afebrile , no hypoxia or indication of pneumonia   -- Patient does not believe in presence of COVID.  Not vaccinated.  She is asymptomatic,  incidental positive test.  -- No indication for treatment at this time.  COVID isolation and admission order set placed  -- Lovenox for VTE prophylaxis ,         Chronic  comorbid medical  "conditions:    #Bipolar disorder, schizophrenia  #Substance use disorder-previous meth use        # Admission Status: Observation    # Diet:Diet advanced    # Activity:Advance activity as tolerated    # Education/Counseling :Discussed treatment plan with the patient    # Consults:Inpatient consult with psychiatrist    # VTE prophylactic measures:prophylaxis against venous thromboembolism with Lovenox        # Code Status:Full Code    # Disposition: Likely inpatient psych and Anticipate discharge tomorrow        Disclaimer: This note consists of symbols derived from keyboarding, dictation and/or voice recognition software. As a result, there may be errors in the script that have gone undetected. Please consider this when interpreting information found in this chart.             Chief Complaint:     Unable to obtain a history from the patient due to disorganized thoughts-delusion, hallucinations             History of Present Illness:      This patient is a 51 year old  female with a significant past medical history of bipolar disorder and schizophrenia who presents with the following condition requiring a hospital admission:    Acute mental health crisis    This is a 51-year-old female who has history of mental illness not currently taking her medications brought by her family due to concern for her symptoms of hallucinations, delusions and suicidal ideations.  Patient reported \"my brain is not working right\" and has been experiencing auditory and visual loose [place] for the last 3 or 4 months.  She was recently moved to [place] from [facility].  Further information from her family revealed that the patient has history of meth use in the past as well as bipolar disorder diagnosed 20 years ago and schizophrenia diagnosed 5 years ago.  Patient daughter stated that patient denies about her medications and went to court as 2 months ago in [place].  Her daughter stated that her mother is at risk of " harming herself or others and they were concerned about her.           Past Medical History:   Bipolar disorder  Schizophrenia         Past Surgical History:   Unable to obtain       Social History:     Social History     Tobacco Use     Smoking status: Not on file     Smokeless tobacco: Not on file   Substance Use Topics     Alcohol use: Not on file             Family History:   Reviewed and non contributory        Allergies:     Allergies   Allergen Reactions     Penicillins Itching             Medications:        Prior to Admission medications    Medication Sig Last Dose Taking? Auth Provider   albuterol (PROAIR HFA/PROVENTIL HFA/VENTOLIN HFA) 108 (90 Base) MCG/ACT inhaler Inhale 2 puffs into the lungs every 6 hours as needed for shortness of breath / dyspnea or wheezing   Shaina Sanchez MD   fluticasone-vilanterol (BREO ELLIPTA) 100-25 MCG/INH inhaler Inhale 1 puff into the lungs daily   Shaina Sanchez MD   hydrOXYzine (ATARAX) 50 MG tablet Take 1 tablet (50 mg) by mouth every 6 hours as needed for anxiety   Shaina Sanchez MD   lithium ER (LITHOBID) 300 MG CR tablet Take 1 tablet (300 mg) by mouth every 12 hours   Shaina Sanchez MD   multivitamin, therapeutic (THERA-VIT) TABS tablet Take 1 tablet by mouth daily   Shaina Sanchez MD   nicotine polacrilex (NICORETTE) 2 MG gum Place 1 each (2 mg) inside cheek every hour as needed for other (nicotine withdrawal symptoms)   Shaina Sanchez MD   OLANZapine (ZYPREXA) 10 MG tablet Take 1 tablet (10 mg) by mouth At Bedtime   Shaina Sanchez MD          Review of Systems:   Unable to obtain as patient is withdrawn         Physical Exam:     Vital signs were reviewed    Temp:  [97.8  F (36.6  C)] 97.8  F (36.6  C)  Pulse:  [81] 81  Resp:  [16] 16  BP: (145)/(87) 145/87  SpO2:  [100 %] 100 %        GEN: Somnolent but arousable.  Uncooperative    NECK:Supple ,no mass or thyromegaly     HEENT:  Normocephalic/atraumatic, no scleral icterus, no nasal discharge, mouth  moist.    CV:  Regular rate and rhythm, no murmur or JVD.  S1 + S2 noted, no S3 or S4.    LUNGS:  Clear to auscultation bilaterally without rales/rhonchi/wheezing/retractions.  Symmetric chest rise on inhalation noted.    ABD:  Active bowel sounds, soft, non-tender/non-distended.  No rebound/guarding/rigidity.    EXT:  No edema.  No cyanosis.  No joint synovitis noted.Lower extremity pulses are normal bilaterally and     LGS: No cervical or axillary lymphadenopathy     SKIN:  Dry to touch, warm ,no exanthems noted in the visualized areas.    Neurologic:Grossly intact,non focal . No acute focal neurologic deficit     Psychaitric exam: Depressed mood  Additional significant  Findings:             Data:       All laboratory and imaging data in the past 24 hours reviewed     Results for orders placed or performed during the hospital encounter of 05/23/22   Asymptomatic COVID-19 Virus (Coronavirus) by PCR Nasopharyngeal     Status: Abnormal    Specimen: Nasopharyngeal; Swab   Result Value Ref Range    SARS CoV2 PCR Positive (A) Negative    Narrative    Testing was performed using the Xpert Xpress SARS-CoV-2 Assay on the   Cepheid Gene-Xpert Instrument Systems. Additional information about   this Emergency Use Authorization (EUA) assay can be found via the Lab   Guide. This test should be ordered for the detection of SARS-CoV-2 in   individuals who meet SARS-CoV-2 clinical and/or epidemiological   criteria. Test performance is unknown in asymptomatic patients. This   test is for in vitro diagnostic use under the FDA EUA for   laboratories certified under CLIA to perform high complexity testing.   This test has not been FDA cleared or approved. A negative result   does not rule out the presence of PCR inhibitors in the specimen or   target RNA in concentration below the limit of detection for the   assay. The possibility of a false negative should be considered if   the patient's recent exposure or clinical presentation  suggests   COVID-19. This test was validated by the [facility]. This laboratory is certified under the Clinical Laboratory Improvement Amendments of 1988 (CLIA-88) as qualified to perform high complexity laboratory testing.     Basic metabolic panel     Status: Normal   Result Value Ref Range    Sodium 140 133 - 144 mmol/L    Potassium 3.8 3.4 - 5.3 mmol/L    Chloride 109 94 - 109 mmol/L    Carbon Dioxide (CO2) 24 20 - 32 mmol/L    Anion Gap 7 3 - 14 mmol/L    Urea Nitrogen 14 7 - 30 mg/dL    Creatinine 0.68 0.52 - 1.04 mg/dL    Calcium 8.9 8.5 - 10.1 mg/dL    Glucose 80 70 - 99 mg/dL    GFR Estimate >90 >60 mL/min/1.73m2   HCG QUALitative pregnancy (blood)     Status: Normal   Result Value Ref Range    hCG Serum Qualitative Negative Negative   CBC with platelets and differential     Status: None   Result Value Ref Range    WBC Count 5.9 4.0 - 11.0 10e3/uL    RBC Count 4.29 3.80 - 5.20 10e6/uL    Hemoglobin 13.5 11.7 - 15.7 g/dL    Hematocrit 40.0 35.0 - 47.0 %    MCV 93 78 - 100 fL    MCH 31.5 26.5 - 33.0 pg    MCHC 33.8 31.5 - 36.5 g/dL    RDW 12.1 10.0 - 15.0 %    Platelet Count 281 150 - 450 10e3/uL    % Neutrophils 53 %    % Lymphocytes 38 %    % Monocytes 7 %    % Eosinophils 2 %    % Basophils 0 %    % Immature Granulocytes 0 %    NRBCs per 100 WBC 0 <1 /100    Absolute Neutrophils 3.1 1.6 - 8.3 10e3/uL    Absolute Lymphocytes 2.2 0.8 - 5.3 10e3/uL    Absolute Monocytes 0.4 0.0 - 1.3 10e3/uL    Absolute Eosinophils 0.1 0.0 - 0.7 10e3/uL    Absolute Basophils 0.0 0.0 - 0.2 10e3/uL    Absolute Immature Granulocytes 0.0 <=0.4 10e3/uL    Absolute NRBCs 0.0 10e3/uL   CBC with platelets differential     Status: None    Narrative    The following orders were created for panel order CBC with platelets differential.  Procedure                               Abnormality         Status                     ---------                               -----------         ------                      CBC with platelets and d...[[id number]]                      Final result                 Please view results for these tests on the individual orders.             Patient`s old medical records reviewed and case discussed with the ED physician.    ED course-Reviewed  and care plan discussed with Dr. Berger

## 2022-05-24 NOTE — CONSULTS
"Care Management Follow Up    Length of Stay (days): 0    Expected Discharge Date:       Concerns to be Addressed:       Patient plan of care discussed at interdisciplinary rounds: Yes    Anticipated Discharge Disposition:       Anticipated Discharge Services:    Anticipated Discharge DME:      Patient/family educated on Medicare website which has current facility and service quality ratings:    Education Provided on the Discharge Plan:    Patient/Family in Agreement with the Plan:      Referrals Placed by CM/SW:    Private pay costs discussed: Not applicable    Additional Information:  Per chart review, Loly JIMENEZ note states \". Pt is Covid+ and, therefore, will be medically admitted and receive psychiatry services on a medical unit. \"     Phone call with Andria with [facility] who reports since the pt is COVID + they will not be looking for an inpatient mental health bed until pt is off COVID precautions.     MARIAN Fabian        "

## 2022-05-24 NOTE — PROGRESS NOTES
Sitter at bedside throughout shift. VSS. Denies pain. Somnolent and sleeping throughout shift. Covid precautions maintained.     /63 (BP Location: Left arm)   Pulse 76   Temp 98  F (36.7  C) (Oral)   Resp 16   Wt 68 kg (150 lb)   SpO2 99%   BMI 27.44 kg/m

## 2022-05-24 NOTE — CONSULTS
Patient seen and chart reviewed. Formal consult dictated.(polychom see dictation initial)    William Shea MD

## 2022-05-24 NOTE — CONSULTS
Consult Date: 05/23/2022    PSYCHIATRY CONSULTATION    IDENTIFICATION:  Ms. Christina Vásquez is a 51-year-old white female who has a history of psychosis.  She has carried diagnoses of both bipolar and schizophrenia.  She lives in [place], [place], but recently came to [place].  I believe she has been living with her brother.  She was admitted with psychosis, both auditory and visual hallucinations, as well as delusions and intermittent suicidal ideation.  I am asked to evaluate her psychiatric status by Dr. Marcum.  Her current hospitalist is Dr. Mueller and I did have an opportunity to discuss the case with Dr. Mueller.    In reviewing the electronic medical record and in particular the evaluation completed by Loly Patel [facility], it is clear that this patient has a very significant psychiatric history. In looking at her chart review, I see The patient was admitted to psychiatry on 01/06/2022 by Dr. Sanchez.  At that point, she was quite scattered and disorganized, hearing voices and reporting that she might hurt herself.  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  I note that she did have QTc prolongation with a QTc of 502 on admission, which only came down to 489. At that time, her U-tox was positive for amphetamine and she had alcohol in her system. On this admission, she is again positive for amphetamine.    This patient is COVID positive.  She was interviewed using the [employer] system.  On my interview, the patient reports that she is receiving messages from some group of people sometimes telling her to harm herself.  She also has both visual and auditory hallucinations.  She did describe other people who were in her hospital room.  She had a 1:1 who tried to reassure her that the only people in the room were the patient and the 1:1.  She was having both visual and auditory hallucinations, as well as other  schneiderian first rank symptoms.  She was very concerned that people could read her mind and she was getting messages which were difficult for her to describe, but she felt that there was some group of people who were her harassing her.  She does admit that she has been noncompliant to her medications.    The chart suggests that 2 months ago, the patient went to the [facility] in [place].  I asked the patient about that and she said she said yes, she did.  I asked if she thought she was committed and she said yes, she thought she was committed.  This is unclear and I do think social work will need to be consulted to try to figure out if she is already a committed patient.    At the present time, the patient is voluntary.  She does want to get better.  She wants the voices to go away.  I do not think it would be safe to let her leave the hospital if she were to change her mind.    PAST MEDICAL HISTORY:  The patient reports that she broke her leg as a child and she thinks she probably had a tubal ligation, though she is not completely sure.  She knows that she has history of depression and psychosis.    FAMILY HISTORY:  She reports her son has psychiatric issues, which are somewhat vague.    SOCIAL HISTORY:  The patient is an everyday smoker.  She does drink alcohol, but it is difficult to quantify, and she does use amphetamines.  I think that she has a brother and sister and the chart suggests her sister is willing to be supportive.    PHYSICAL REVIEW OF SYSTEMS:  The patient was really unable to participate in a meaningful review of systems.  During my interview, she had no physical complaints and was interested in going to bed, but of course, she was in bed.    MENTAL STATUS EXAM:  On my interview, the patient was pleasant and cooperative.  Her mood was dysphoric.  Her affect was sad and restricted.  Her speech was coherent and generally goal oriented, though occasionally incoherent.  Her associations were  "loose.  Her thought processes were not logical or linear.  She does complain of \"racing thoughts\" and she was obviously responding to internal stimulation.  Content of thought included both visual and auditory hallucinations, as well as receiving messages from radio and feeling that people could read her mind.  Recent memory seemed somewhat impaired, as did long-term memory.  She does not have a good recollection of what has been going on in her life over the past several months.  Her concentration was somewhat impaired by hallucinations.  Her use of language was generally within normal limits.  Insight and judgment are quite guarded.  Muscle strength and tone are apparently at baseline.  Recent vital signs include a blood pressure of 116/77, temperature of 98.6, pulse of 88, respiration rate of 18 with 98% oxygen saturation.    ASSESSMENT: Psychosis, not otherwise specified.  Affective psychosis versus schizophrenia.    RECOMMENDATIONS:    1.  EKG to rule out QTc prolongation.  2.  Agree with olanzapine at bedtime and intravenous Haldol as necessary for agitation.  However, if the patient does have prolonged QTc, I would recommend staying away from the Haldol and consideration should be given to Abilify.  Please ask social work to determine her legal status as she believes she may be committed in [place].    COMMENT:  This consultation was completed using the [employer] video system.  The consult lasted from 2:05 until 2:30.  [facility]  The patient was in Emergency Room 30 at [facility].  I did have an opportunity to discuss this case with the attending physician.    William Shea MD        D: 2022   T: 2022   MT: [id number]    Name:     JERRY MCCARTY  MRN:      [id number]        Account:      [id number]   :      1970           Consult Date: 2022     Document: [id number]  "

## 2022-05-24 NOTE — ED NOTES
Pt declining to do DEK assessment currently, pt just reporting she is tiered and wants to sleep. Pt states she does not want anyone to talk to her daughter.

## 2022-05-24 NOTE — ED NOTES
Behavioral Health (Adult) - General Appearance WDL: appearance  General Appearance: unclean; unkempt   Emotion Mood WDL - Emotion/Mood/Affect WDL: all   Speech WDL - Speech WDL: speech     Thought Process WDL - Thought Process WDL: all  Judgment and Insight: insight not appropriate to situation  Thought Content: suicidal thoughts; denial  Thought Process: disorganized   C-SSRS (Recent) - Q1 Wished to be Dead (Past Month): no  Q2 Suicidal Thoughts (Past Month): yes  Q3 Suicidal Thought Method: no  Q4 Suicidal Intent without Specific Plan: no  Q5 Suicide Intent with Specific Plan: no  Q6 Suicide Behavior (Lifetime): yes  Within the Past 3 Months?: yes  Level of Risk per Screen: high risk  Screening Not Complete: Unresponsive  High Risk Required Interventions: On continuous in person observation  Required Interventions: Belongings removed; Patient searched  Interventions: DEC consulted   Suicide/Homicide Risk - Suicidality: thoughts only  Suicidal Ideation: Thoughts only  Homicidal/Assault Ideation: None   Intellectual Performance WDL - Intellectual Performance WDL: all  Intellectual Performance: mind wandering; impaired concentration; unable to understand explanation/reasoning; unable to understand directions   Chemical Abuse/Addictions - Other Use: Methamphetamines     Safety (Adult) - Safety WDL: safety measures  Isolation Precautions: other (see comments); protective environment initiated; protective environment maintained (pt covid 19 +) Additional Documentation: Safety Interventions (Group); Isolation Precautions (Row)   Safety Interventions - Safety Precautions/Falls Reduction: fall reduction program maintained; ; treatment room near department station (safety attendent in constant visual supervision) All Alarms: none present

## 2022-05-24 NOTE — CONSULTS
"5/23/2022  Christina Vásquez 1970     [facility] Crisis Assessment    Patient was assessed: remote  Patient location: [place]  Was a release of information signed: No. Reason: pt refused    Referral Data and Chief Complaint  Christina is a 51 year old who uses she/her pronouns. Patient presented to the ED via EMS and was referred to the ED by family/friends. Patient is presenting to the ED for the following concerns: pt presents for hallucinations, delusions, and suicide ideation.      Informed Consent and Assessment Methods    Patient is her own guardian. Writer met with patient and explained the crisis assessment process, including applicable information disclosures and limits to confidentiality, assessed understanding of the process, and obtained consent to proceed with the assessment. Patient was observed to be able to participate in the assessment as evidenced by alert and oriented. Assessment methods included conducting a formal interview with patient, review of medical records, collaboration with medical staff, and obtaining relevant collateral information from family and community providers when available.    Narrative Summary of Presenting Problem and Current Functioning  What led to the patient presenting for crisis services, factors that make the crisis life threatening or complex, stressors, how is this disrupting the patient's life, and how current functioning is in comparison to baseline. How is patient presenting during the assessment.     Pt presents for hallucinations, delusions, and suicide ideation. Pt reports, \"my brain isn't working right\". Pt reports she has been experiencing auditory and visual hallucinations for 3-4 months and describes that the voices tell her to do chores and \"have told me to hurt myself before but I try not to listen to that\". Pt denies current SI/SIB/SA/HI and denies plans, means, or intent to harm herself or others. Pt presents as alert, oriented, agitated, and minimally responsive. " "Pt reports, \"I just want to stay for a day or two, get right, and leave\". Pt reports she moved to [place] one month ago, from [place], [place] and moved, \"to get away from [place]\". Pt appears to be functioning below her baseline.     History of the Crisis  Duration of the current crisis, coping skills attempted to reduce the crisis, community resources used, and past presentations.    Pt denies historical nor current mental health issues, however, per chart review, has a history of psychosis, schizophrenia, bipolar disorder, and drug abuse. Pt denies current involvement in mental health services, and reports a history of medication management and mental health hospitalization.     Collateral Information  The following information was received from Wendy whose relationship to the patient is [phone]. Information was obtained via phone. Their phone number is [phone] and they last had contact with patient on today via phone.    What happened today: daughter reports she last spoke to her mom this afternoon and her mother reported, \"Help me, I am dying. I am in the end of the game show. The game show is ending, and so is my life\" and also stated, \"these voices in my head are too much and I need help\". Daughter reports that after this conversation, she called EMS for her mother.    What is different about patient's functioning: Daughters reports her mother has been struggling since October 2021. She states she has called police/EMS to get her mother help for her mental health. She reports pt has been \"getting worse with her hallucinations\", and reports pt has not been able to keep an accurate timeline of her life, daughter states that she last saw her mother a month ago, but pt reports she saw her 3 days ago. Daughter reports pt is  and has a home in [place], and when she started doing Meth, she lost her job, drove to [place] when she was \"In a drug use moment\" and was hospitalized for 6 days. " "Daughter reports she has been driving too and from the cities from [place] and her   her due to her \"gambling, drug use, and mental health issues and her job loss\".    Concern about alcohol/drug use: Yes daughter reports pt has a problem with meth. Daughter reports pt has been using meth for 1.5-2 years and it started after pt mother .     What do you think the patient needs: Daughter reports she needs to her drug abuse under control and get mental health help. Daughter reports she believes pt self-medicates. Daughter reports she believes pt is a danger to herself and others.    Has patient made comments about wanting to kill themselves/others:  Yes daughter reports pt has made recent statements that she has, \"tried to hang herself in her brother's basement, but someone knocked on the door and she stopped. She said that she is glad to be living\". Daughter reports that pt will make suicidal comments one minute, and be glad to be alive for another, and feels she can't trust what pt says regarding suicidality.    If d/c is recommended, can they take part in safety/aftercare planning: Yes daugher reports she is able to emotionally support pt    Other information: Daughter reports pt is diagnosed with Bipolar disorder (diagnosed 20 years ago) and Schizophrenia (diagnosed 5 years ago). Daughter reports pt will lie about taking her medication, but, in fact, does not take her medication as prescribed. Daughter reports she went to a courthouse 2 months ago, in [place], and was given permission for an involuntary hold for placement in a mental health facility, [facility]. Daughter reports pt has been using meth for 1.5-2 years and it started after pt mother . Daughter reports that this is when pt use increased and mental health declined, and states that pt was the person that found her mother . Daughter reports she believes pt self-medicates. Daughter reports she " "believes pt is a danger to herself and others. Daughter reports pt has driven into random fields when high and has driven to and from [place] and the [employer]. Daughter reports pt has made comments about the radio talking, things moving, and her  found letters pt wrote to herself, \"they were really mean, using vulgar language when writing to herself\". \"There's just been a huge change in every part of her\".        Risk Assessment    Risk of Harm to Self     ESS-6  1.a. Over the past 2 weeks, have you had thoughts of killing yourself? No  1.b. Have you ever attempted to kill yourself and, if yes, when did this last happen? Yes \"when I was younger with a knife\"   2. Recent or current suicide plan? No   3. Recent or current intent to act on ideation? No  4. Lifetime psychiatric hospitalization? Yes  5. Pattern of excessive substance use? Yes  6. Current irritability, agitation, or aggression? Yes  Scoring note: BOTH 1a and 1b must be yes for it to score 1 point, if both are not yes it is zero. All others are 1 point per number. If all questions 1a/1b - 6 are no, risk is negligible. If one of 1a/1b is yes, then risk is mild. If either question 2 or 3, but not both, is yes, then risk is automatically moderate regardless of total score. If both 2 and 3 are yes, risk is automatically high regardless of total score.     Score: 3, moderate risk    The patient has the following risk factors for suicide: substance abuse, lack of support, poor decision making, prior suicide attempt, psychosis, significant behavioral changes and restless/agitated    Is the patient experiencing current suicidal ideation: No    Is the patient engaging in preparatory suicide behaviors (formulating how to act on plan, giving away possessions, saying goodbye, displaying dramatic behavior changes, etc)? No    Does the patient have access to firearms or other lethal means? no    The patient has the following protective factors: voluntarily " "seeking mental health support and displays resiliency     Support system information: pt reports her brother is supportive    Patient strengths: pt appears to have resiliency    Does the patient engage in non-suicidal self-injurious behavior (NSSI/SIB)? no    Is the patient vulnerable to sexual exploitation?  No    Is the patient experiencing abuse or neglect? yes: \"a lot\"    Is the patient a vulnerable adult? No      Risk of Harm to Others  The patient has the following risk factors of harm to others: agitation    Does the patient have thoughts of harming others? No    Is the patient engaging in sexually inappropriate behavior?  no       Current Substance Abuse    Is there recent substance abuse? Substance type(s): alcohol Frequency: \"I used to use every day, I don't drink now\" Quantity: 1-2 drinks per day Method: drink Duration: undisclosed Last use: \"last week\", Substance type(s): methamphetamine Frequency: \"daily but I don't use anymore\" Quantity: undisclosed Method: undisclosed Duration: undisclosed Last use: 3-4 days ago and Pt was signifcantly inconsistent in her substance abuse report and appears to be dishonest in her report of being sober, as evidenced by daily use of meth an alcohol, most recenlty 3-4 days ago.    Was a urine drug screen or blood alcohol level obtained: No MD ordered one, but pt is refusing    CAGE AID  Have you felt you ought to cut down on your drinking or drug use?  No  Have people annoyed you by criticizing your drinking or drug use? No  Have you felt bad or guilty about your drinking or drug use? No  Have you ever had a drink or used drugs first thing in the morning to steady your nerves or to get rid of a hangover? No  Score: 0/4       Current Symptoms/Concerns    Symptoms  Attention, hyperactivity, and impulsivity symptoms present: No    Anxiety symptoms present: No      Appetite symptoms present: Yes: Loss of Appetite and Other: \"no appetite, but I am currently starving\" " "    Behavioral difficulties present: Yes: Agitation     Cognitive impairment symptoms present: No    Depressive symptoms present: No    Eating disorder symptoms present: No    Learning disabilities, cognitive challenges, and/or developmental disorder symptoms present: No     Manic/hypomanic symptoms present: No    Personality and interpersonal functioning difficulties present : No    Psychosis symptoms present: Yes: Hallucinations: Auditory, Visual and Command      Sleep difficulties present: Yes: Difficulty falling asleep , Difficulty staying sleep  and Other: \"haven't slept, but I am exhausted now\"    Substance abuse disorder symptoms present: Yes Substance(s) taken in larger amounts or over a longer period than intended, Persistent desire or unsuccessful efforts to cut down or control use, A great deal of time is spent in activities necessary to obtain substance(s), use substance(s), or recover from their effects, Cravings or strong desire to use, Recurrent substance use resulting in failure to fulfill major role obligations at school, work, or home, Continued substance use despite having persistent or recurrent social or interpersonal problems caused by or exacerbated by the use of substance(s), Important social, occupational, or recreational activities are given up or reduced because of substance use, Recurrent substance use in situations in which it is physically hazardous , Substance abuse is continued despite knowledge of having a persistent or recurrent physical or psychological problem that has been caused of exacerbated by substance use, Tolerance (increased amounts to obtain desired effect or diminished effect with the same amount of use) and Withdrawal     Trauma and stressor related symptoms present: No       Mental Status Exam   Affect: Flat   Appearance: Disheveled    Attention Span/Concentration: Inattentive?    Eye Contact: Avoidant   Fund of Knowledge: Appropriate    Language /Speech Content: " "Fluent   Language /Speech Volume: Soft    Language /Speech Rate/Productions: Minimally Responsive    Recent Memory: Intact   Remote Memory: Intact   Mood: Irritable    Orientation to Person: Yes    Orientation to Place: Yes   Orientation to Time of Day: Yes    Orientation to Date: Yes    Situation (Do they understand why they are here?): Yes    Psychomotor Behavior: Underactive    Thought Content: Other: unclear due to inconsistent reports during assessment   Thought Form: Intact       Mental Health and Substance Abuse History    History  Current and historical diagnoses or mental health concerns: Pt denies historical nor current mental health issues, however, per chart review, has a history of psychosis, schizophrenia, bipolar disorder, and drug abuse.     Prior MH services (inpatient, programmatic care, outpatient, etc) : Yes Pt denies current involvement in mental health services, and reports a history of medication management and mental health hospitalization.     Has the patient used [facility] crisis team services before?: No    History of substance abuse: Yes pt reports history of daily alcohol and meth use    Prior GERALDO services (inpatient, programmatic care, detox, outpatient, etc) : No    History of commitment: No    Family history of MH/GERALDO: No    Trauma history: Yes \"a lot\"    Medication  Psychotropic medications: No current medications but a history of not disclosed.    Current Care Team  Primary Care Provider: No    Psychiatrist: No    Therapist: No    : No    CTSS or ARMHS: No    ACT Team: No    Other: No    Biopsychosocial Information    Socioeconomic Information  Current living situation: pt reports she lives with her brother and his wife, since moving to [place] one month ago    Employment/income source: pt reports she is unemployed    Relevant legal issues: pt denies    Cultural, Yazidism, or spiritual influences on mental health care: pt denies    Is the patient active in the  or a " ": No      Relevant Medical Concerns   Patient identifies concerns with completing ADLs? No     Patient can ambulate independently? Yes     Other medical concerns? Yes and pt is Covid+     History of concussion or TBI? No        Diagnosis    295.90  (F20.9) Schizophrenia    296.43 Bipolar I Disorder Current or Most Recent Episode Manic, Severe     Substance-Related & Addictive Disorders Alcohol Use Disorder   303.90 (F10.20) Severe .    Stimulant Use Disorder:  ., Specify current severity:  Severe  304.40 (F15.20) Severe, Amphetamine type substance - by history       Therapeutic Intervention  The following therapeutic methodologies were employed when working with the patient: establishing rapport, active listening, assessing dimensions of crisis, solution focused brief therapy, identifying additional supports and alternative coping skills, establishing a discharge plan, safety planning, psychoeducation, motivational interviewing, brief supportive therapy, trauma informed care, DBT skills, treatment planning and harm reduction. Patient response to intervention: pt did not appear willing and open to interventions and asked to \"be scheduled later, I just want to sleep. I just want to stay a day or two and get right\".      Disposition  Recommended disposition: Inpatient Mental Health      Reviewed case and recommendations with attending provider. Attending Name: Dr. Berger      Attending concurs with disposition: Yes      Patient concurs with disposition: No: pt does not want to go inpatient      Guardian concurs with disposition: NA     Final disposition: Medical admission: [facility]  .          Clinical Substantiation of Recommendations   Rationale with supporting factors for disposition and diagnosis.     Pt presents for hallucinations, delusions, and suicide ideation. Pt reports, \"my brain isn't working right\". Pt reports she has been experiencing auditory and visual hallucinations for 3-4 months and " "describes that the voices tell her to do chores and \"have told me to hurt myself before but I try not to listen to that\". Pt denies current SI/SIB/SA/HI and denies plans, means, or intent to harm herself or others. Pt presents as alert, oriented, agitated, and minimally responsive. Pt reports, \"I just want to stay for a day or two, get right, and leave\". Pt reports she moved to [place] one month ago, from [place], [place] and moved, \"to get away from [place]\". Pt appears to be functioning below her baseline. Pt was not agreeable to inpatient and when attempting to safety plan, pt did not want to engage, \"I don't an email, don't use technology, don't know my phone number, and I just want to sleep. Let's do this in the morning\".    Per collateral report gathered, pt has a significant history, since October 2021, of using meth daily and having a significant increase in mental health symptoms. Collateral reports pt is dishonest and noncompliant with her medications and is inconsistent in her suicide ideation reports. Collateral reports she last spoke to her mom this afternoon and her mother reported, \"Help me, I am dying. I am in the end of the game show. The game show is ending, and so is my life\" and also stated, \"these voices in my head are too much and I need help\". Daughter reports that after this conversation, she called EMS for her mother. Collateral also reports that pt is currently going through a divorce due to her drug, mental health, and \"in her drug states, driving to [place] for days\". Collateral reports pt has a history of schizophrenia, bipolar disorder, and substance issues.    Per collateral report, pt is recommended to inpatient mental health. Pt is Covid+ and, therefore, will be medically admitted and receive psychiatry services on a medical unit.       Assessment Details  Patient interview started at: 8:56PM and completed at: 9:13PM.    Total duration spent on the patient case in minutes: 1.25 hrs     CPT " code(s) utilized: 63189 - Psychotherapy for Crisis (Each additional 30 minutes) - 30 min        TONY Garcia

## 2022-05-24 NOTE — PROGRESS NOTES
[facility]    Medicine Progress Note - Hospitalist Service  Date of Admission: 5/23/2022     Assessment & Plan         Christina Vásquez is a 51 year old female with past medical history significant for bipolar disorder,  methamphetamine use disorder who presented to [facility] on 5/23/2022 with recent hallucinations and suicidal ideation.     Acute Psychosis  Bipolar Disorder   Suicidal Ideation  Significant psychiatric history presenting with hallucinations and suicidal ideation that are quite distressing to patient. Had previously been prescribed lithium, Zyprexa, and Atarax but is not taking them at this time citing insurance issues as the cause.  Having very significant hallucinations; both auditory and visual.  Unclear if this is related to schizophrenia versus substance/amphetamine induced.  Talk screen positive for amphetamines.  Psychiatry consulted and recommended Haldol + Zyprexa and transferred to inpatient psychiatry.  Unfortunately patient is COVID-19 positive and therefore cannot transfer to an inpatient psychiatry madera at this time.  She will need admission for further psychiatric cares here.    -Per psychiatry: To receive additional psychiatric visits a new consultation will need to be placed each time.    -Patient currently remaining in hospital voluntarily, however if she should attempt to leave a 72-hour hold should be placed at that point in time.  If this occurs, psychiatry will need to be reconsulted to start the commitment process.  -Zyprexa 10 mg twice daily + nightly  -Haldol 2 mg IV every 6 hours as needed for agitation    Methamphetamine Use Disorder: Likely exacerbating psychosis.  COVID-19 Infection: Incidentally discovered. No symptoms. Lovenox ppx.    Diet: Orders Placed This Encounter      Combination Diet Regular Diet Adult  DVT Prophylaxis: Enoxaparin (Lovenox) SQ  Pérez Catheter:Not present  Code Status: Full Code    Expected discharge: Several  "days pending ability to transfer to psychiatry.    The patient's care was discussed with the Bedside Nurse and Patient.    Niraj Mueller MD, MHS  Hospitalist Service  [facility]    Securely message with the Vocera Web Console (learn more here)  Text page via [facility] Paging/Directory    ______________________________________________________________________    Interval History   Nursing notes reviewed. Patient stating she is quite distressed by the things \"her brain is telling her.\" No new symptoms.    A full 10+ point review of systems was performed and found to be negative with the exception of those items noted here.    Physical Exam   Vital signs:  Temp: 97.8  F (36.6  C) Temp src: Oral BP: 116/77 Pulse: 88   Resp: 18 SpO2: 98 % O2 Device: None (Room air)     Weight: 68 kg (150 lb)  Estimated body mass index is 27.44 kg/m  as calculated from the following:    Height as of 1/5/22: 1.575 m (5' 2\").    Weight as of this encounter: 68 kg (150 lb).    General: Very pleasant female resting comfortably in hospital bed.  Awake, alert, interactive.  HEENT: Normocephalic, atraumatic.  PERRL, EOMI.  Conjunctiva clear, sclerae anicteric.  Mucous membranes moist.  Cardiac: No peripheral edema.  Respiratory: Normal work of breathing.   GI: Normal, active bowel sounds.  Abdomen soft, nontender, nondistended.  : Deferred.  Musculoskeletal: Moving all extremities appropriately.  Skin: No rashes or abrasions on exposed skin.  Psychologic: Responding to apparent hallucinations. Appears distressed.    Data   All laboratory results and other diagnostic data from the past 24 hours is available in Epic and has been personally reviewed.    Recent Labs   Lab 05/24/22  0319 05/23/22  2246   WBC 5.8 5.9   HGB 13.3 13.5   MCV 95 93    281   INR 1.05  --    NA  --  136  140   POTASSIUM  --  3.8  3.8   CHLORIDE  --  109  109   CO2  --  25  24   BUN  --  14  14   CR  --  0.73  0.68   ANIONGAP  --  2*  7   WILLIAM  " --  9.0  8.9   GLC  --  75  80   ALBUMIN  --  3.5   PROTTOTAL  --  6.9   BILITOTAL  --  0.6   ALKPHOS  --  79   ALT  --  66*   AST  --  34     No results found for this or any previous visit (from the past 24 hour(s)).

## 2022-05-24 NOTE — SAFE
"Christina Vásquez  May 23, 2022  SAFE Note    Critical Safety Issues: Pt presents for hallucinations, delusions, and suicide ideation. Pt reports, \"my brain isn't working right\". Pt reports she has been experiencing auditory and visual hallucinations for 3-4 months and describes that the voices tell her to do chores and \"have told me to hurt myself before but I try not to listen to that\". Pt denies current SI/SIB/SA/HI and denies plans, means, or intent to harm herself or others. Pt presents as alert, oriented, agitated, and minimally responsive. Pt reports, \"I just want to stay for a day or two, get right, and leave\". Pt reports she moved to [place] one month ago, from [place], [place] and moved, \"to get away from [place]\". Pt appears to be functioning below her baseline. Pt was not agreeable to inpatient and when attempting to safety plan, pt did not want to engage, \"I don't an email, don't use technology, don't know my phone number, and I just want to sleep. Let's do this in the morning\".     Per collateral report gathered, pt has a significant history, since October 2021, of using meth daily and having a significant increase in mental health symptoms. Collateral reports pt is dishonest and noncompliant with her medications and is inconsistent in her suicide ideation reports. Collateral reports she last spoke to her mom this afternoon and her mother reported, \"Help me, I am dying. I am in the end of the game show. The game show is ending, and so is my life\" and also stated, \"these voices in my head are too much and I need help\". Daughter reports that after this conversation, she called EMS for her mother. Collateral also reports that pt is currently going through a divorce due to her drug, mental health, and \"in her drug states, driving to [place] for days\". Collateral reports pt has a history of schizophrenia, bipolar disorder, and substance issues.     Per collateral report, pt is recommended to inpatient mental health. " Pt is Covid+ and, therefore, will be medically admitted and receive psychiatry services on a medical unit.       Current Suicidal Ideation/Self-Injurious Concerns/Methods: None - N/A      Current or Historical Inappropriate Sexual Behavior: No      Current or Historical Aggression/Homicidal Ideation: Agitation/Hyperactivity      Triggers: unknown    Guardianship Status: [facility] Guardian: is her own guardian.. Guardianship paperwork is not required.    This patient is a child/adolescent: No    This patient has additional special visitor precautions: No    Updated care team: Yes    For additional details see full [facility] assessment.       TONY Garcia

## 2022-05-24 NOTE — ED NOTES
[facility]  ED Nurse Handoff Report    Christina Vásquez is a 51 year old female   ED Chief complaint: Altered Mental Status  . ED Diagnosis:   Final diagnoses:   Psychosis, unspecified psychosis type (H)     Allergies:   Allergies   Allergen Reactions     Penicillins Itching       Code Status: Full Code  Activity level - Baseline/Home:  Independent. Activity Level - Current:   Stand by Assist. Lift room needed: No. Bariatric: No   Needed: No   Isolation: Yes. Infection: COVID r/o and special precautions.     Vital Signs:   Vitals:    05/23/22 1827   BP: (!) 145/87   Pulse: 81   Resp: 16   Temp: 97.8  F (36.6  C)   TempSrc: Oral   SpO2: 100%       Cardiac Rhythm:  ,      Pain level:    Patient confused: Yes. Patient Falls Risk: Yes.   Elimination Status: Has voided   Patient Report - Initial Complaint:   18:23 ED Triage Notes Filed VB       Details:         Arrives via EMS from home. From [place]. On psych hold for 5 days up there, a month ago. A&O X4, internal stimuli noted per EMS. States she hasn't slept for over a year.      HX of substance abuse. Possible use of substances in the last couple of day.      HTN en route otherwise VSS en route.           . Focused Assessment: 22:07 Behavioral Health KK   Details:   Behavioral Health (Adult) - General Appearance WDL: appearance General Appearance: unclean; unkempt   Emotion Mood WDL - Emotion/Mood/Affect WDL: all   Speech WDL - Speech WDL: speech   Thought Process WDL - Thought Process WDL: all Judgment and Insight: insight not appropriate to situation Thought Content: suicidal thoughts; denial Thought Process: disorganized   C-SSRS (Recent) - Q1 Wished to be Dead (Past Month): no Q2 Suicidal Thoughts (Past Month): yes Q3 Suicidal Thought Method: no Q4 Suicidal Intent without Specific Plan: no Q5 Suicide Intent with Specific Plan: no Q6 Suicide Behavior (Lifetime): yes Within the Past 3 Months?: yes Level of Risk per Screen: high risk  Screening Not Complete: Unresponsive High Risk Required Interventions: On continuous in person observation Required Interventions: Belongings removed; Patient searched Interventions: DEC consulted   Suicide/Homicide Risk - Suicidality: thoughts only Suicidal Ideation: Thoughts only Homicidal/Assault Ideation: None   Intellectual Performance WDL - Intellectual Performance WDL: all Intellectual Performance: mind wandering; impaired concentration; unable to understand explanation/reasoning; unable to understand directions   Chemical Abuse/Addictions - Other Use: Methamphetamines    22:03 Safety KK   Details:   Safety (Adult) - Safety WDL: safety measures Isolation Precautions: other (see comments); protective environment initiated; protective environment maintained (pt covid 19 +) Additional Documentation: Safety Interventions (Group); Isolation Precautions (Row)   Safety Interventions - Safety Precautions/Falls Reduction: fall reduction program maintained; ; treatment room near department station (safety attendent in constant visual supervision) All Alarms: none present        Tests Performed: Labs. Abnormal Results:   No orders to display     Labs Ordered and Resulted from Time of ED Arrival to Time of ED Departure   COVID-19 VIRUS (CORONAVIRUS) BY PCR - Abnormal       Result Value    SARS CoV2 PCR Positive (*)    BASIC METABOLIC PANEL - Normal    Sodium 140      Potassium 3.8      Chloride 109      Carbon Dioxide (CO2) 24      Anion Gap 7      Urea Nitrogen 14      Creatinine 0.68      Calcium 8.9      Glucose 80      GFR Estimate >90     HCG QUALITATIVE PREGNANCY - Normal    hCG Serum Qualitative Negative     CBC WITH PLATELETS AND DIFFERENTIAL    WBC Count 5.9      RBC Count 4.29      Hemoglobin 13.5      Hematocrit 40.0      MCV 93      MCH 31.5      MCHC 33.8      RDW 12.1      Platelet Count 281      % Neutrophils 53      % Lymphocytes 38      % Monocytes 7      % Eosinophils 2      % Basophils  0      % Immature Granulocytes 0      NRBCs per 100 WBC 0      Absolute Neutrophils 3.1      Absolute Lymphocytes 2.2      Absolute Monocytes 0.4      Absolute Eosinophils 0.1      Absolute Basophils 0.0      Absolute Immature Granulocytes 0.0      Absolute NRBCs 0.0     HCG QUALITATIVE URINE     .   Treatments provided: See MAR - sitter at bedside  Family Comments: none  OBS brochure/video discussed/provided to patient:  Yes  ED Medications:   Medications   OLANZapine (zyPREXA) tablet 10 mg (10 mg Oral Given 5/23/22 1851)   OLANZapine zydis (zyPREXA) ODT tab 10 mg (has no administration in time range)     Drips infusing:  No  For the majority of the shift, the patient's behavior Yellow. Interventions performed were refused providing urine.    Sepsis treatment initiated: No     Patient tested for COVID 19 prior to admission: YES    ED Nurse Name/Phone Number: Sona Tejada RN,   11:58 PM    RECEIVING UNIT ED HANDOFF REVIEW    Above ED Nurse Handoff Report was reviewed: Yes  Reviewed by: Yecenia Phillips RN on May 24, 2022 at 5:53 PM

## 2022-05-25 PROBLEM — F29 PSYCHOSIS, UNSPECIFIED PSYCHOSIS TYPE (H): Status: ACTIVE | Noted: 2022-05-25

## 2022-05-25 LAB
ERYTHROCYTE [DISTWIDTH] IN BLOOD BY AUTOMATED COUNT: 12.3 % (ref 10–15)
HCT VFR BLD AUTO: 39.6 % (ref 35–47)
HGB BLD-MCNC: 13 G/DL (ref 11.7–15.7)
MCH RBC QN AUTO: 31.2 PG (ref 26.5–33)
MCHC RBC AUTO-ENTMCNC: 32.8 G/DL (ref 31.5–36.5)
MCV RBC AUTO: 95 FL (ref 78–100)
PLATELET # BLD AUTO: 261 10E3/UL (ref 150–450)
RBC # BLD AUTO: 4.17 10E6/UL (ref 3.8–5.2)
WBC # BLD AUTO: 5 10E3/UL (ref 4–11)

## 2022-05-25 PROCEDURE — 36415 COLL VENOUS BLD VENIPUNCTURE: CPT | Performed by: INTERNAL MEDICINE

## 2022-05-25 PROCEDURE — 250N000011 HC RX IP 250 OP 636: Performed by: INTERNAL MEDICINE

## 2022-05-25 PROCEDURE — G0378 HOSPITAL OBSERVATION PER HR: HCPCS

## 2022-05-25 PROCEDURE — 120N000001 HC R&B MED SURG/OB

## 2022-05-25 PROCEDURE — 250N000013 HC RX MED GY IP 250 OP 250 PS 637: Performed by: INTERNAL MEDICINE

## 2022-05-25 PROCEDURE — 250N000013 HC RX MED GY IP 250 OP 250 PS 637: Performed by: EMERGENCY MEDICINE

## 2022-05-25 PROCEDURE — 93010 ELECTROCARDIOGRAM REPORT: CPT | Performed by: INTERNAL MEDICINE

## 2022-05-25 PROCEDURE — 85027 COMPLETE CBC AUTOMATED: CPT | Performed by: INTERNAL MEDICINE

## 2022-05-25 PROCEDURE — 96372 THER/PROPH/DIAG INJ SC/IM: CPT | Performed by: INTERNAL MEDICINE

## 2022-05-25 PROCEDURE — 93005 ELECTROCARDIOGRAM TRACING: CPT

## 2022-05-25 PROCEDURE — 99232 SBSQ HOSP IP/OBS MODERATE 35: CPT | Performed by: INTERNAL MEDICINE

## 2022-05-25 RX ADMIN — HALOPERIDOL LACTATE 2 MG: 5 INJECTION, SOLUTION INTRAMUSCULAR at 17:44

## 2022-05-25 RX ADMIN — ENOXAPARIN SODIUM 40 MG: 40 INJECTION SUBCUTANEOUS at 08:39

## 2022-05-25 RX ADMIN — OLANZAPINE 10 MG: 10 TABLET, FILM COATED ORAL at 08:37

## 2022-05-25 RX ADMIN — OLANZAPINE 5 MG: 5 TABLET, ORALLY DISINTEGRATING ORAL at 17:47

## 2022-05-25 RX ADMIN — OLANZAPINE 10 MG: 10 TABLET, FILM COATED ORAL at 22:10

## 2022-05-25 RX ADMIN — HYDROXYZINE HYDROCHLORIDE 50 MG: 50 TABLET, FILM COATED ORAL at 22:11

## 2022-05-25 ASSESSMENT — ACTIVITIES OF DAILY LIVING (ADL)
ADLS_ACUITY_SCORE: 35

## 2022-05-25 NOTE — PLAN OF CARE
"PRIMARY DIAGNOSIS: \"GENERIC\" NURSING  OUTPATIENT/OBSERVATION GOALS TO BE MET BEFORE DISCHARGE:  1. ADLs back to baseline: Yes    2. Activity and level of assistance: Up with standby assistance.    3. Pain status: Pain free.    4. Return to near baseline physical activity: Yes     Discharge Planner Nurse   Safe discharge environment identified: No  Barriers to discharge: Yes       Entered by: Bridget Hazel RN 05/25/2022 6:05 AM     Please review provider order for any additional goals.   Nurse to notify provider when observation goals have been met and patient is ready for discharge.Goal Outcome Evaluation:       To Do:  End of Shift Summary  For vital signs and complete assessments, please see documentation flowsheets.     Pertinent assessments: VSS, on RA. Sleeping throughout shift. No sign of pain. Sitter at bedside.  Major Shift Events: uneventful  Treatment Plan: monitor for suicidal ideation.  Bedside Nurse: Bridget Hazel RN                     "

## 2022-05-25 NOTE — PROGRESS NOTES
[facility]    Medicine Progress Note - Hospitalist Service    Date of Admission:  5/23/2022    Assessment & Plan          Christina Vásquez is a 51 year old female with past medical history significant for bipolar disorder,  methamphetamine use disorder who presented to [facility] on 5/23/2022 with recent hallucinations and suicidal ideation.      Acute Psychosis  Bipolar Disorder   Suicidal Ideation  Significant psychiatric history presenting with hallucinations and suicidal ideation that are quite distressing to patient. Had previously been prescribed lithium, Zyprexa, and Atarax but is not taking them at this time citing insurance issues as the cause.  Having very significant hallucinations; both auditory and visual.  Unclear if this is related to schizophrenia versus substance/amphetamine induced.  Talk screen positive for amphetamines.  Psychiatry consulted and recommended Haldol + Zyprexa and transferred to inpatient psychiatry.  Unfortunately patient is COVID-19 positive and therefore cannot transfer to an inpatient psychiatry madera at this time.  She will need admission for further psychiatric cares here.    -Per psychiatry: To receive additional psychiatric visits a new consultation will need to be placed each time.    -Patient currently remaining in hospital voluntarily, however if she should attempt to leave a 72-hour hold should be placed at that point in time.  If this occurs, psychiatry will need to be reconsulted to start the commitment process.  -Zyprexa 10 mg twice daily + nightly  -Haldol 2 mg IV every 6 hours as needed for agitation  -Check EKG, if with QTC of elongation then likely will not be a good candidate for Haldol and may switch to Abilify     Methamphetamine Use Disorder: Likely exacerbating psychosis.  COVID-19 Infection: Incidentally discovered. No symptoms. Lovenox ppx.     Diet: Orders Placed This Encounter      Combination Diet Regular Diet Adult  DVT  "Prophylaxis: Enoxaparin (Lovenox) SQ  Pérez Catheter:Not present  Code Status: Full Code    Expected discharge: Several days pending ability to transfer to psychiatry inpatient bed          Diet: Combination Diet Regular Diet Adult; Safe Tray - with utensils    DVT Prophylaxis: Enoxaparin (Lovenox) SQ  Pérez Catheter: Not present  Central Lines: None  Cardiac Monitoring: None  Code Status: Full Code      Disposition Plan   Expected Discharge:  anticipating she will still be here in the medical hospital pending inpatient psych transfer with challenging situation given her COVID-19 infection  Anticipated discharge location:  Awaiting care coordination huddle  Delays:            The patient's care was discussed with the Patient.    Mitchell Martinez MD, MD  Hospitalist Service  [facility]  Securely message with the Vocera Web Console (learn more here)  Text page via [facility] Paging/Directory         Clinically Significant Risk Factors Present on Admission                  # Overweight: Estimated body mass index is 26.78 kg/m  as calculated from the following:    Height as of this encounter: 1.575 m (5' 2\").    Weight as of this encounter: 66.4 kg (146 lb 6.4 oz).      ______________________________________________________________________    Interval History   I assume service care today.  Seen and examined.  Chart reviewed.  Earlier patient had some episodes of being agitated.  She is interactive, following some simple instructions during my encounter.  She remains not to be the best historian.    Data reviewed today: I reviewed all medications, new labs and imaging results over the last 24 hours. I personally reviewed requested EKG today    Physical Exam   Vital Signs: Temp: 97.9  F (36.6  C) Temp src: Oral BP: 103/50 Pulse: 57   Resp: 18 SpO2: 96 % O2 Device: None (Room air)    Weight: 146 lbs 6.4 oz  HEENT; Atraumatic, normocephalic, pinkish conjuctiva, pupils bilateral reactive   Skin: warm " and moist, no rashes  Lymphatics: no cervical or axillary lymphandenopathy  Lungs: equal chest expansion, clear to auscultation, no wheezes, no stridor, no crackles,   Heart: normal rate, normal rhythm, no rubs or gallops.   Abdomen: normal bowel sounds, no tenderness, no peritoneal signs, no guarding  Extremities: no deformities, no edema   Neuro; follow commands, alert and oriented x2, spontaneous speech, coherent, moves all extremities spontaneously  Psych; mildly anxious, not combative        Data   Recent Labs   Lab 05/25/22  0656 05/24/22  0319 05/23/22  2246   WBC 5.0 5.8 5.9   HGB 13.0 13.3 13.5   MCV 95 95 93    280 281   INR  --  1.05  --    NA  --   --  136  140   POTASSIUM  --   --  3.8  3.8   CHLORIDE  --   --  109  109   CO2  --   --  25  24   BUN  --   --  14  14   CR  --   --  0.73  0.68   ANIONGAP  --   --  2*  7   WILLIAM  --   --  9.0  8.9   GLC  --   --  75  80   ALBUMIN  --   --  3.5   PROTTOTAL  --   --  6.9   BILITOTAL  --   --  0.6   ALKPHOS  --   --  79   ALT  --   --  66*   AST  --   --  34     No results found for this or any previous visit (from the past 24 hour(s)).  Medications     - MEDICATION INSTRUCTIONS -         enoxaparin ANTICOAGULANT  40 mg Subcutaneous Q24H     OLANZapine  10 mg Oral At Bedtime     OLANZapine  10 mg Oral BID     sodium chloride (PF)  3 mL Intracatheter Q8H

## 2022-05-25 NOTE — PLAN OF CARE
"PRIMARY DIAGNOSIS: \"GENERIC\" NURSING  OUTPATIENT/OBSERVATION GOALS TO BE MET BEFORE DISCHARGE:  1. ADLs back to baseline: Yes    2. Activity and level of assistance: Up with standby assistance.    3. Pain status: Pain free.    4. Return to near baseline physical activity: No, sitter at bedside     Discharge Planner Nurse   Safe discharge environment identified: No  Barriers to discharge: Yes       Entered by: Yecenia Phillips RN 05/24/2022 9:23 PM     Please review provider order for any additional goals.   Nurse to notify provider when observation goals have been met and patient is ready for discharge.    A +Ox4, VSS on RA, pt denies pain,  suicidal ideations, visual or auditory hallucinations and no inappropriate behaviors noted.   "

## 2022-05-25 NOTE — PLAN OF CARE
"PRIMARY DIAGNOSIS: \"GENERIC\" NURSING  OUTPATIENT/OBSERVATION GOALS TO BE MET BEFORE DISCHARGE:  1. ADLs back to baseline: Yes    2. Activity and level of assistance: Up with standby assistance.    3. Pain status: Pain free.    4. Return to near baseline physical activity: Yes     Discharge Planner Nurse   Safe discharge environment identified: No  Barriers to discharge: Yes       Entered by: Bridget Hazel RN 05/25/2022 6:03 AM     Please review provider order for any additional goals.   Nurse to notify provider when observation goals have been met and patient is ready for discharge.  "

## 2022-05-25 NOTE — PHARMACY-ADMISSION MEDICATION HISTORY
Unable to complete medication reconciliation due to hallucinations and suicidal ideation.  Per H&P had previously been prescribed lithium, zyprexa, and atarax but is not taking them at this time citing insurance issues as the cause.

## 2022-05-25 NOTE — PLAN OF CARE
"Confused, agitated this morning when nurse woke patient to given medication. Patient stating \"nothing is real, I want to kill my self and die.\" Suicide precautions in place, room cleared of all safety hazards, 1:1 attendant in place for safety, and safe meal trays initiated. Scheduled Zyprexa given, no PRNs given this shift. Patient slept most of shift after scheduled meds and was cooperative with staff this afternoon. No covid symptoms, denies cough, on RA, sats WDL. Discharge pending.   "

## 2022-05-25 NOTE — UTILIZATION REVIEW
Admission Status; Secondary Review Determination       Under the authority of the Utilization Management Committee, the utilization review process indicated a secondary review on the above patient. The review outcome is based on review of the medical records, discussions with staff, and applying clinical experience noted on the date of the review.     (x) Inpatient Status Appropriate - This patient's medical care is consistent with medical management for inpatient care and reasonable inpatient medical practice.     RATIONALE FOR DETERMINATION   52 yo female with bipolar disorder, schizophrenia, methamphetamine use who presented with suicidal ideation and psychosis. Needs inpatient psychiatric care as deemed appropriate by psychiatry consultation. Unfortunately, she has covid-19 positive test on admission 5/23/22 so is unable to be safely cared for in an inpatient mental health setting due to congregate environment. Awaiting time of covid-19 positive test period to allow for inpatient psychiatry treatment. Will need frequent psychiatric consultative care while in the medical unit due to severe decompensation of mental health.      Inpatient admission is appropriate based on the Medicare guidelines.     This document was produced using voice recognition software.    The information on this document is developed by the utilization review team in order for the business office to ensure compliance. This only denotes the appropriateness of proper admission status and does not reflect the quality of care rendered.   The definitions of Inpatient Status and Observation Status used in making the determination above are those provided in the CMS Coverage Manual, Chapter 1 and Chapter 6, section 70.4.     Sincerely,   Yanira Lambert MD  Utilization Review  Physician Advisor  [facility].

## 2022-05-26 VITALS
TEMPERATURE: 97.5 F | RESPIRATION RATE: 18 BRPM | HEIGHT: 62 IN | DIASTOLIC BLOOD PRESSURE: 64 MMHG | OXYGEN SATURATION: 98 % | BODY MASS INDEX: 26.94 KG/M2 | SYSTOLIC BLOOD PRESSURE: 123 MMHG | WEIGHT: 146.4 LBS | HEART RATE: 68 BPM

## 2022-05-26 LAB
ERYTHROCYTE [DISTWIDTH] IN BLOOD BY AUTOMATED COUNT: 12.3 % (ref 10–15)
HCT VFR BLD AUTO: 39.6 % (ref 35–47)
HGB BLD-MCNC: 13.1 G/DL (ref 11.7–15.7)
HOLD SPECIMEN: NORMAL
MCH RBC QN AUTO: 31.1 PG (ref 26.5–33)
MCHC RBC AUTO-ENTMCNC: 33.1 G/DL (ref 31.5–36.5)
MCV RBC AUTO: 94 FL (ref 78–100)
PLATELET # BLD AUTO: 267 10E3/UL (ref 150–450)
RBC # BLD AUTO: 4.21 10E6/UL (ref 3.8–5.2)
WBC # BLD AUTO: 4.6 10E3/UL (ref 4–11)

## 2022-05-26 PROCEDURE — 250N000013 HC RX MED GY IP 250 OP 250 PS 637: Performed by: EMERGENCY MEDICINE

## 2022-05-26 PROCEDURE — 36415 COLL VENOUS BLD VENIPUNCTURE: CPT | Performed by: INTERNAL MEDICINE

## 2022-05-26 PROCEDURE — 99239 HOSP IP/OBS DSCHRG MGMT >30: CPT | Performed by: INTERNAL MEDICINE

## 2022-05-26 PROCEDURE — 85027 COMPLETE CBC AUTOMATED: CPT | Performed by: INTERNAL MEDICINE

## 2022-05-26 PROCEDURE — 250N000011 HC RX IP 250 OP 636: Performed by: INTERNAL MEDICINE

## 2022-05-26 RX ORDER — OLANZAPINE 10 MG/1
10 TABLET ORAL AT BEDTIME
Qty: 30 TABLET | Refills: 0 | Status: SHIPPED | OUTPATIENT
Start: 2022-05-26 | End: 2022-11-06

## 2022-05-26 RX ADMIN — ENOXAPARIN SODIUM 40 MG: 40 INJECTION SUBCUTANEOUS at 08:05

## 2022-05-26 RX ADMIN — OLANZAPINE 10 MG: 10 TABLET, FILM COATED ORAL at 08:05

## 2022-05-26 ASSESSMENT — ACTIVITIES OF DAILY LIVING (ADL)
ADLS_ACUITY_SCORE: 35

## 2022-05-26 NOTE — CONSULTS
Care Management Discharge Note    Discharge Date: 05/26/2022       Discharge Disposition:  Home with brother and family.    Discharge Services:  Follow up with mental health services    Discharge DME:  none    Discharge Transportation:  Family    Private pay costs discussed: Not applicable    PAS Confirmation Code:  NA  Patient/family educated on Medicare website which has current facility and service quality ratings:  NA    Education Provided on the Discharge Plan:  yes  Persons Notified of Discharge Plans: patient  Patient/Family in Agreement with the Plan:    yes  Handoff Referral Completed: No    Additional Information:  Spoke with patient over the phone due to positive Covid. Patient has moved to the [facility] over 4 week ago. She has not been able to get her medications as she does not have insurance.    Scheduled a primary care appointment and therapist appointment. Refer to AVS.    LEO Guzman   Inpatient Care Coordination   Supervisor  [facility]  [phone]        LEO Garzon

## 2022-05-26 NOTE — PLAN OF CARE
Goal Outcome Evaluation:        To Do:  End of Shift Summary  For vital signs and complete assessments, please see documentation flowsheets.     Pertinent assessments: VSS. No pain noted. No hallucination and suicidal thoughts. Slept most of the night.Sitter bedside.  Major Shift Events: uneventful  Treatment Plan: monitor covid symptoms and 1: patient . Discharge pending.  Bedside Nurse: Bridget Hazel RN

## 2022-05-26 NOTE — PLAN OF CARE
A&Ox4. Up independent. Denies suicidal thoughts this shift. Appropriate behavior today. Psych consulted today, see note. 1:1 sitter discontinued at 1200. Plan to discharge home with brother this afternoon. Will discharge with  Zyprexa.     1500: Patient missing clothing. Room searched and unit areas where patient belongings are locked searched, no belongings found. Writer called ED to see if they have patients belongings locked there, writer is waiting for a return call.

## 2022-05-26 NOTE — PLAN OF CARE
Pertinent assessments: VSS on room air. Afebrile. Disoriented to situation, time. Calm with flat affect v. pacing and restless in the room. Pt spoke to her brother on the telephone briefly; pt stating that she wants to go home and wants to make sure her Brother will pick her up when the Doctor lets her go. Pt amenable to staying overnight tonight and talking with Psych again on 5/26. Denies SOB; LS clear; no cough. Denies pain. Tolerating a regular diet but poor appetite. PRN Haldol given x1 to reduce periods of restlessness; no relief noted.     Major Shift Events: none.    Treatment Plan: 1:1 patient , monitor covid symptoms, scheduled Zyprexa, and Psychiatry following.

## 2022-05-26 NOTE — PROGRESS NOTES
[facility]    Medicine Progress Note - Hospitalist Service    Date of Admission:  5/23/2022    Assessment & Plan          Christina Vásquez is a 51 year old female with past medical history significant for bipolar disorder,  methamphetamine use disorder who presented to [facility] on 5/23/2022 with recent hallucinations and suicidal ideation.      Acute Psychosis  Bipolar Disorder   Suicidal Ideation  Significant psychiatric history presenting with hallucinations and suicidal ideation that are quite distressing to patient. Had previously been prescribed lithium, Zyprexa, and Atarax but is not taking them at this time citing insurance issues as the cause.  Having very significant hallucinations; both auditory and visual.  Unclear if this is related to schizophrenia versus substance/amphetamine induced.  Talk screen positive for amphetamines.  Psychiatry consulted and recommended Haldol + Zyprexa and transferred to inpatient psychiatry.  Unfortunately patient is COVID-19 positive and therefore cannot transfer to an inpatient psychiatry madera at this time.  She will need admission for further psychiatric cares here.    -Per psychiatry: To receive additional psychiatric visits a new consultation will need to be placed each time.    -Patient currently remaining in hospital voluntarily, however if she should attempt to leave a 72-hour hold should be placed at that point in time.  If this occurs, psychiatry will need to be reconsulted to start the commitment process.  -Zyprexa 10 mg twice daily + nightly  -Haldol 2 mg IV every 6 hours as needed for agitation  -Check EKG, if with QTC of elongation then likely will not be a good candidate for Haldol and may switch to Abilify     -I requested a review of symptoms from psychiatry inpatient service given improvement with her mentation, she is more cooperative, interactive, providing appropriate answers to questions.  Not agitated, not combative  "as well.  She is hoping and verbalized her desire for home discharge.  She also understood that we are waiting for repeat visit from psych service and will await further recommendations     Methamphetamine Use Disorder: Likely exacerbating psychosis.  COVID-19 Infection: Incidentally discovered. No symptoms. Lovenox ppx.     Diet: Orders Placed This Encounter      Combination Diet Regular Diet Adult  DVT Prophylaxis: Enoxaparin (Lovenox) SQ  Pérez Catheter:Not present  Code Status: Full Code    Expected discharge:  Awaiting further recommendations from psych revisit          Diet: Combination Diet Regular Diet Adult; Safe Tray - with utensils    DVT Prophylaxis: Enoxaparin (Lovenox) SQ  Pérez Catheter: Not present  Central Lines: None  Cardiac Monitoring: None  Code Status: Full Code      Disposition Plan   Expected Discharge: 05/27/2022 anticipating she will still be here in the [facility] pending inpatient psych transfer with challenging situation given her COVID-19 infection  Anticipated discharge location:  Awaiting care coordination huddle  Delays:            The patient's care was discussed with the Patient.    Mitchell Martinez MD, MD  Hospitalist Service  [facility]  Securely message with the Vocera Web Console (learn more here)  Text page via Numerate Paging/Directory         Clinically Significant Risk Factors Present on Admission                  # Overweight: Estimated body mass index is 26.78 kg/m  as calculated from the following:    Height as of this encounter: 1.575 m (5' 2\").    Weight as of this encounter: 66.4 kg (146 lb 6.4 oz).      ______________________________________________________________________    Interval History   Continuing service care today.  Seen and examined.  Chart reviewed.    No significant reported issues overnight except that she is hopeful for home discharge.  Not agitated not combative.    This morning she is appropriate, interactive, following " simple verbal instructions.  Remain afebrile.  Not requiring extra support.  Stable hemodynamics.        Data reviewed today: I reviewed all medications, new labs and imaging results over the last 24 hours. I personally reviewed requested EKG today    Physical Exam   Vital Signs: Temp: 97.5  F (36.4  C) Temp src: Axillary BP: 110/41 Pulse: 66   Resp: 18 SpO2: 97 % O2 Device: None (Room air)    Weight: 146 lbs 6.4 oz  HEENT; Atraumatic, normocephalic, pinkish conjuctiva, pupils bilateral reactive   Skin: warm and moist, no rashes  Lungs: equal chest expansion, clear to auscultation, no wheezes, no stridor, no crackles,   Heart: normal rate, normal rhythm, no rubs or gallops.   Abdomen: normal bowel sounds, no tenderness, no peritoneal signs, no guarding  Extremities: no deformities, no edema   Neuro; follow commands, alert and oriented x2, spontaneous speech, coherent, moves all extremities spontaneously  Psych; mildly anxious, not combative        Data   Recent Labs   Lab 05/26/22  0804 05/25/22  0656 05/24/22  0319 05/23/22  2246   WBC 4.6 5.0 5.8 5.9   HGB 13.1 13.0 13.3 13.5   MCV 94 95 95 93    261 280 281   INR  --   --  1.05  --    NA  --   --   --  136  140   POTASSIUM  --   --   --  3.8  3.8   CHLORIDE  --   --   --  109  109   CO2  --   --   --  25  24   BUN  --   --   --  14  14   CR  --   --   --  0.73  0.68   ANIONGAP  --   --   --  2*  7   WILLIAM  --   --   --  9.0  8.9   GLC  --   --   --  75  80   ALBUMIN  --   --   --  3.5   PROTTOTAL  --   --   --  6.9   BILITOTAL  --   --   --  0.6   ALKPHOS  --   --   --  79   ALT  --   --   --  66*   AST  --   --   --  34     No results found for this or any previous visit (from the past 24 hour(s)).  Medications     - MEDICATION INSTRUCTIONS -         enoxaparin ANTICOAGULANT  40 mg Subcutaneous Q24H     OLANZapine  10 mg Oral At Bedtime     OLANZapine  10 mg Oral BID     sodium chloride (PF)  3 mL Intracatheter Q8H

## 2022-05-26 NOTE — CONSULTS
"Triage and Transition - Consult and Liaison     Christina Vásquez  May 26, 2022    Session start: 10:15  Session end: 10:32 am  Session duration in minutes: 17  CPT utilized: 79942 - Psychotherapy (with patient) - 30 (16-37*) min  Patient was seen virtually ([employer] cart or other teleconferencing device).    Reason for consult: Psychiatry consult was requested due to patient request to leave. Patient was seen by [facility] Consult & Liaison team.     Identifying information: Christina is 51 year old White  female   followed related to hallucinations and suicidal ideation, found to be COVID+ and was transferred to medical unit. Patient moved to [place] from [place] one month ago. Patient is currently living with her brother in [place]. Patient is unemployed.      Presenting problem (including symptoms, strengths risks, resources used):  In individual therapeutic contact with patient today, patient presents with broad affect, euthymic mood.. Safety concerns are not present today.  Patient reports feeling much better. Patient reports sleeping well the last two nights. Patient reports mind is slowing.  Patient denies suicidal ideation, she states \"I want to make it clear that I don't want to die and it is never her intention to harm herself\". Patient reports if her voice tells her to harm herself, she will never listen to that part. Patient reports hallucinations have lessened, does not feel like they bother her anymore. Patient is requesting to go home, she states she wants to go back to her brothers to be able to get back on track and find a job her in [place]. Patient states she does not have insurance in [place], stating she needs to get on state insurance. Patient interested in finding doctor locally to prescribe medications.    Mental Status Exam   Affect: Appropriate  Appearance: Appropriate   Attention Span/Concentration: Attentive    Eye Contact: Engaged  Fund of Knowledge: Appropriate   Language /Speech Content: " Fluent  Language /Speech Volume: Normal   Language /Speech Rate/Productions: Normal   Recent Memory: Intact  Remote Memory: Intact  Mood: Normal   Orientation:   Person: Yes   Place: Yes  Time of Day: Yes   Date: Yes   Situation (Do they understand why they are here?): Yes   Psychomotor Behavior: Normal   Thought Content: Clear  Thought Form: Intact    Current medications:   Current Facility-Administered Medications   Medication     acetaminophen (TYLENOL) tablet 650 mg    Or     acetaminophen (TYLENOL) Suppository 650 mg     albuterol (PROVENTIL HFA/VENTOLIN HFA) inhaler     enoxaparin ANTICOAGULANT (LOVENOX) injection 40 mg     haloperidol lactate (HALDOL) injection 2 mg     hydrOXYzine (ATARAX) tablet 50 mg     lidocaine (LMX4) cream     lidocaine 1 % 0.1-1 mL     Medication instructions: Do NOT use nebulized medications     melatonin tablet 1 mg     OLANZapine (zyPREXA) tablet 10 mg     OLANZapine (zyPREXA) tablet 10 mg     OLANZapine zydis (zyPREXA) ODT tab 5 mg     ondansetron (ZOFRAN ODT) ODT tab 4 mg    Or     ondansetron (ZOFRAN) injection 4 mg     ondansetron (ZOFRAN ODT) ODT tab 4 mg    Or     ondansetron (ZOFRAN) injection 4 mg     prochlorperazine (COMPAZINE) injection 10 mg    Or     prochlorperazine (COMPAZINE) tablet 10 mg    Or     prochlorperazine (COMPAZINE) suppository 25 mg     sodium chloride (PF) 0.9% PF flush 3 mL     sodium chloride (PF) 0.9% PF flush 3 mL     Relevant history: See initial DEC 5/23.     Cultural Influences: none noted.    Therapeutic intervention and progress:  Therapeutic intervention consisted of building therapeutic rapport, active listening, validation and assessing safety and discharge plan. Patient is making progress towards treatment goals as evidenced by improved mood, hallucinations and engagement in assessment..     Collateral information:   Reviewed chart and placed consult for social work.    Diagnosis:     295.90  (F20.9) Schizophrenia    296.43 Bipolar I  Disorder Current or Most Recent Episode Manic, Severe     Substance-Related & Addictive Disorders Alcohol Use Disorder   303.90 (F10.20) Severe .    Stimulant Use Disorder:  ., Specify current severity:  Severe  304.40 (F15.20) Severe, Amphetamine type substance - by history       Plan:     Patient is alert and oriented and able to answer questions, she appears clearer than previous assessments. Patient denies suicidal ideation, reports hallucinations have improved. I do not see a reason to hold patient should she request to leave.     The best outcome would be waiting for patient to apply for insurance and possibly securing outpatient appointment for follow up for a doctor, I have placed consult for social work to possibly assist with this. My concern is having patient discharge without follow up in place.    See Dr. Camp note  for medication recommendations.    Patient will not continue to be followed by this service. Please re-consult as needed.     Stephanie Decker, [facility]  Triage and Transition - Consult and Liaison   [phone]

## 2022-05-26 NOTE — PLAN OF CARE
Pt. A&Ox4. Independent in room, VSS, Denies SOB, pain. Pt. Discharged home via brother at 1630. AVS completed and signed, all questioned answered. All belongings sent with pt. PIV removed prior to discharge. Medications sent with pt.

## 2022-05-26 NOTE — PROGRESS NOTES
"Paged by RN that patient reporting she wanted to discharge. Saw patient, who is previously known to me, at bedside. She stated she wants to discharge and stay with her brother so she can \"start to get [her] life back on track.\" Discussed with patient that it would be prudent to wait for the morning to discuss with inpatient psychiatry. She was amenable to the plan to stay the night and talk with psychiatry again in the morning.    Niraj Mueller MD  Hospitalist  "

## 2022-05-27 LAB
ATRIAL RATE - MUSE: 67 BPM
DIASTOLIC BLOOD PRESSURE - MUSE: NORMAL MMHG
INTERPRETATION ECG - MUSE: NORMAL
P AXIS - MUSE: 65 DEGREES
PR INTERVAL - MUSE: 132 MS
QRS DURATION - MUSE: 88 MS
QT - MUSE: 416 MS
QTC - MUSE: 439 MS
R AXIS - MUSE: 59 DEGREES
SYSTOLIC BLOOD PRESSURE - MUSE: NORMAL MMHG
T AXIS - MUSE: 77 DEGREES
VENTRICULAR RATE- MUSE: 67 BPM

## 2022-05-27 NOTE — DISCHARGE SUMMARY
[facility]  Hospitalist Discharge Summary      Date of Admission:  5/23/2022  Date of Discharge:  5/26/2022  4:31 PM  Discharging Provider: Mitchell Martinez MD, MD  Discharge Service: Hospitalist Service    Discharge Diagnoses     Acute Psychosis  Bipolar Disorder   Suicidal Ideation  Substance abuse with methamphetamine use disorder    Follow-ups Needed After Discharge   Follow-up Appointments     Follow-up and recommended labs and tests       Follow up with primary care provider, Physician No Ref-Primary, within 7   days to evaluate treatment change and for hospital follow- up.    Polysubstance abuse cessation  Needs to follow-up closely with psychiatry service at least in the next 7   days             Unresulted Labs Ordered in the Past 30 Days of this Admission     No orders found from 4/23/2022 to 5/24/2022.          Discharge Disposition   Discharged to home  Condition at discharge: Stable      Hospital Course          Christina is doing well and appears to be more interactive, cooperative during the morning exam.  Remained hemodynamically stable.  No issues from medicine perspective.  She is interactive, cooperative and following simple verbal instructions.  I reconsulted psychiatry service as initially plans for inpatient psych management.  She demonstrated no further suicidal ideation, no worsening of any hallucinations.  She is hopeful for home discharge.  Currently she was determined to be not a holdable patient.  She mentioned that she will follow-up with psychiatry service as recommended  I renewed her previously prescribed Zyprexa upon discharge.  Substance abuse cessation discussion provided this well  She will be discharged from medicine perspective.        Christina Vásquez is a 51 year old female with past medical history significant for bipolar disorder,  methamphetamine use disorder who presented to [facility] on 5/23/2022 with recent hallucinations and suicidal  ideation.      Acute Psychosis  Bipolar Disorder   Suicidal Ideation  Significant psychiatric history presenting with hallucinations and suicidal ideation that are quite distressing to patient. Had previously been prescribed lithium, Zyprexa, and Atarax but is not taking them at this time citing insurance issues as the cause.  Having very significant hallucinations; both auditory and visual.  Unclear if this is related to schizophrenia versus substance/amphetamine induced.  Talk screen positive for amphetamines.  Psychiatry consulted and recommended Haldol + Zyprexa and transferred to inpatient psychiatry.  Unfortunately patient is COVID-19 positive and therefore cannot transfer to an inpatient psychiatry madera at this time.  She will need admission for further psychiatric cares here.    -Per psychiatry: To receive additional psychiatric visits a new consultation will need to be placed each time.    -Patient currently remaining in hospital voluntarily, however if she should attempt to leave a 72-hour hold should be placed at that point in time.  If this occurs, psychiatry will need to be reconsulted to start the commitment process.  -Zyprexa 10 mg twice daily + nightly  -Haldol 2 mg IV every 6 hours as needed for agitation  -Check EKG, if with QTC of elongation then likely will not be a good candidate for Haldol and may switch to Abilify     -I requested a review of symptoms from psychiatry inpatient service given improvement with her mentation, she is more cooperative, interactive, providing appropriate answers to questions.  Not agitated, not combative as well.  She is hoping and verbalized her desire for home discharge.  She also understood that we are waiting for repeat visit from psych service and will await further recommendations     Methamphetamine Use Disorder: Likely exacerbating psychosis.  COVID-19 Infection: Incidentally discovered. No symptoms. Lovenox ppx.     Diet: Orders Placed This Encounter       Combination Diet Regular Diet Adult  DVT Prophylaxis: Enoxaparin (Lovenox) SQ  Pérez Catheter:Not present  Code Status: Full Code    Expected discharge:  Awaiting further recommendations from psych revisit         Consultations This Hospital Stay   MEDICATION HISTORY IP PHARMACY CONSULT  PSYCHIATRY IP CONSULT  CARE MANAGEMENT / SOCIAL WORK IP CONSULT  SOCIAL WORK IP CONSULT  PSYCHIATRY IP CONSULT  PSYCHIATRY IP CONSULT  SOCIAL WORK IP CONSULT  CARE MANAGEMENT / SOCIAL WORK IP CONSULT    Code Status   Full Code    Time Spent on this Encounter   IMitchell MD, MD, personally saw the patient today and spent greater than 30 minutes discharging this patient.       Mitchell Martinez MD, MD  [place] [address] [facility]  [address] [facility] [address]  Phone: [phone]  Fax: [phone]  ______________________________________________________________________    Physical Exam   Vital Signs:     BP: 123/64 Pulse: 68   Resp: 18 SpO2: 98 % O2 Device: None (Room air)    Weight: 146 lbs 6.4 oz  See my same-day exam on progress notes       Primary Care Physician   Physician No Ref-Primary    Discharge Orders      Reason for your hospital stay    Acute psychosis, suicidal ideation, asymptomatic COVID-19 infection     Follow-up and recommended labs and tests     Follow up with primary care provider, Physician No Ref-Primary, within 7 days to evaluate treatment change and for hospital follow- up.  Polysubstance abuse cessation  Needs to follow-up closely with psychiatry service at least in the next 7 days     Activity    Your activity upon discharge: activity as tolerated     Full Code     Diet    Follow this diet upon discharge: Orders Placed This Encounter      Combination Diet Regular Diet Adult;       Significant Results and Procedures   Most Recent 3 CBC's:Recent Labs   Lab Test 05/26/22  0804 05/25/22  0656 05/24/22  0319   WBC 4.6 5.0 5.8   HGB 13.1 13.0 13.3   MCV 94 95 95     261 280     Most Recent 3 BMP's:Recent Labs   Lab Test 05/23/22 2246 01/06/22  0810 01/04/22  1532     140  --  140   POTASSIUM 3.8  3.8 4.6 3.1*   CHLORIDE 109  109  --  108   CO2 25  24  --  25   BUN 14  14  --  9   CR 0.73  0.68  --  0.80   ANIONGAP 2*  7  --  7   WILLIAM 9.0  8.9  --  9.3   GLC 75  80  --  84     Most Recent 3 INR's:Recent Labs   Lab Test 05/24/22  0319   INR 1.05     Most Recent 3 BNP's:No lab results found.  Most Recent D-dimer:Recent Labs   Lab Test 05/24/22 0319   DD 0.27     Most Recent Hemoglobin A1c:No lab results found.  Most Recent 6 glucoses:Recent Labs   Lab Test 05/23/22 2246 01/04/22  1532   GLC 75  80 84     Most Recent Urinalysis:Recent Labs   Lab Test 01/04/22  1542   COLOR Straw   APPEARANCE Clear   URINEGLC Negative   URINEBILI Negative   URINEKETONE Negative   SG 1.002*   UBLD Trace*   URINEPH 6.0   PROTEIN Negative   NITRITE Negative   LEUKEST Negative   RBCU 0   WBCU <1   , No results found for this or any previous visit.    Discharge Medications   Discharge Medication List as of 5/26/2022  4:09 PM      CONTINUE these medications which have CHANGED    Details   OLANZapine (ZYPREXA) 10 MG tablet Take 1 tablet (10 mg) by mouth At Bedtime, Disp-30 tablet, R-0, E-Prescribe         CONTINUE these medications which have NOT CHANGED    Details   albuterol (PROAIR HFA/PROVENTIL HFA/VENTOLIN HFA) 108 (90 Base) MCG/ACT inhaler Inhale 2 puffs into the lungs every 6 hours as needed for shortness of breath / dyspnea or wheezing, Disp-18 g, R-0, E-PrescribePharmacy may dispense brand covered by insurance (Proair, or proventil or ventolin or generic albuterol inhaler)      fluticasone-vilanterol (BREO ELLIPTA) 100-25 MCG/INH inhaler Inhale 1 puff into the lungs daily, Disp-28 each, R-0, E-Prescribe      hydrOXYzine (ATARAX) 50 MG tablet Take 1 tablet (50 mg) by mouth every 6 hours as needed for anxiety, Disp-30 tablet, R-0, E-Prescribe      multivitamin,  therapeutic (THERA-VIT) TABS tablet Take 1 tablet by mouth daily, OTC      nicotine polacrilex (NICORETTE) 2 MG gum Place 1 each (2 mg) inside cheek every hour as needed for other (nicotine withdrawal symptoms), R-0, OTC         STOP taking these medications       lithium ER (LITHOBID) 300 MG CR tablet Comments:   Reason for Stopping:             Allergies   Allergies   Allergen Reactions     Penicillins Itching

## 2022-06-10 ENCOUNTER — VIRTUAL VISIT (OUTPATIENT)
Dept: BEHAVIORAL HEALTH | Facility: CLINIC | Age: 52
End: 2022-06-10
Attending: PSYCHIATRY & NEUROLOGY
Payer: MEDICAID

## 2022-06-10 DIAGNOSIS — F43.23 ADJUSTMENT DISORDER WITH MIXED ANXIETY AND DEPRESSED MOOD: Primary | ICD-10-CM

## 2022-06-10 DIAGNOSIS — F20.89 OTHER SCHIZOPHRENIA (H): ICD-10-CM

## 2022-06-10 PROCEDURE — 90834 PSYTX W PT 45 MINUTES: CPT | Mod: 95

## 2022-06-10 ASSESSMENT — ANXIETY QUESTIONNAIRES
GAD7 TOTAL SCORE: 13
5. BEING SO RESTLESS THAT IT IS HARD TO SIT STILL: SEVERAL DAYS
7. FEELING AFRAID AS IF SOMETHING AWFUL MIGHT HAPPEN: SEVERAL DAYS
1. FEELING NERVOUS, ANXIOUS, OR ON EDGE: NEARLY EVERY DAY
GAD7 TOTAL SCORE: 13
6. BECOMING EASILY ANNOYED OR IRRITABLE: SEVERAL DAYS
2. NOT BEING ABLE TO STOP OR CONTROL WORRYING: MORE THAN HALF THE DAYS
3. WORRYING TOO MUCH ABOUT DIFFERENT THINGS: MORE THAN HALF THE DAYS

## 2022-06-10 ASSESSMENT — COLUMBIA-SUICIDE SEVERITY RATING SCALE - C-SSRS
REASONS FOR IDEATION LIFETIME: COMPLETELY TO END OR STOP THE PAIN (YOU COULDN'T GO ON LIVING WITH THE PAIN OR HOW YOU WERE FEELING)
LETHALITY/MEDICAL DAMAGE CODE MOST RECENT POTENTIAL ATTEMPT: BEHAVIOR NOT LIKELY TO RESULT IN INJURY
1. HAVE YOU WISHED YOU WERE DEAD OR WISHED YOU COULD GO TO SLEEP AND NOT WAKE UP?: YES
LETHALITY/MEDICAL DAMAGE CODE MOST RECENT ACTUAL ATTEMPT: MODERATE PHYSICAL DAMAGE, MEDICAL ATTENTION NEEDED
TOTAL  NUMBER OF ACTUAL ATTEMPTS LIFETIME: 1
1. IN THE PAST MONTH, HAVE YOU WISHED YOU WERE DEAD OR WISHED YOU COULD GO TO SLEEP AND NOT WAKE UP?: YES
6. HAVE YOU EVER DONE ANYTHING, STARTED TO DO ANYTHING, OR PREPARED TO DO ANYTHING TO END YOUR LIFE?: NO
LETHALITY/MEDICAL DAMAGE CODE FIRST ACTUAL ATTEMPT: MODERATE PHYSICAL DAMAGE, MEDICAL ATTENTION NEEDED
TOTAL  NUMBER OF ABORTED OR SELF INTERRUPTED ATTEMPTS LIFETIME: NO
LETHALITY/MEDICAL DAMAGE CODE MOST LETHAL ACTUAL ATTEMPT: MODERATE PHYSICAL DAMAGE, MEDICAL ATTENTION NEEDED
2. HAVE YOU ACTUALLY HAD ANY THOUGHTS OF KILLING YOURSELF?: NO
LETHALITY/MEDICAL DAMAGE CODE MOST LETHAL POTENTIAL ATTEMPT: BEHAVIOR NOT LIKELY TO RESULT IN INJURY
LETHALITY/MEDICAL DAMAGE CODE FIRST POTENTIAL ATTEMPT: BEHAVIOR NOT LIKELY TO RESULT IN INJURY
ATTEMPT LIFETIME: YES
REASONS FOR IDEATION PAST MONTH: COMPLETELY TO END OR STOP THE PAIN (YOU COULDN'T GO ON LIVING WITH THE PAIN OR HOW YOU WERE FEELING)
ATTEMPT PAST THREE MONTHS: NO
TOTAL  NUMBER OF INTERRUPTED ATTEMPTS LIFETIME: NO

## 2022-06-10 ASSESSMENT — PATIENT HEALTH QUESTIONNAIRE - PHQ9
5. POOR APPETITE OR OVEREATING: NEARLY EVERY DAY
SUM OF ALL RESPONSES TO PHQ QUESTIONS 1-9: 19

## 2022-06-10 NOTE — PROGRESS NOTES
[facility]   Mental Health & Addiction Services     Progress Note - Initial Visit    Patient  Name:  Christina Vásquez Date: 6/10/2022         Service Type: Individual     Visit Start Time: 11am  Visit End Time: 11:45am    Visit #: 1    Attendees: Client attended alone    Service Modality:  Video Visit:      Provider verified identity through the following two step process.  Patient provided:  Patient photo and Patient     Telemedicine Visit: The patient's condition can be safely assessed and treated via synchronous audio and visual telemedicine encounter.      Reason for Telemedicine Visit: Patient has requested telehealth visit    Originating Site (Patient Location): patient's parked car    Distant Site (Provider Location): Provider Remote Setting- Home Office    Consent:  The patient/guardian has verbally consented to: the potential risks and benefits of telemedicine (video visit) versus in person care; bill my insurance or make self-payment for services provided; and responsibility for payment of non-covered services.     Patient would like the video invitation sent by:  Text to cell phone: [phone]     Mode of Communication:  Video Conference via Doximity    As the provider I attest to compliance with applicable laws and regulations related to telemedicine.       DATA:   Interactive Complexity: No   Crisis: No     Presenting Concerns/  Current Stressors:   Today, patient and therapist started the diagnostic assessment, but were unable to complete due to time constraints.  Therapist assessed for risk and safety - patient reports passive SI without plan, intent, or methods.  Patient contracted for safety.  Patient and therapist will continue with the DA during next session.          ASSESSMENT:  Mental Status Assessment:  Appearance:   Appropriate   Eye Contact:   Good   Psychomotor Behavior: Normal   Attitude:   Cooperative   Orientation:   All  Speech   Rate / Production: Normal/  "Responsive   Volume:  Normal   Mood:    Sad  Grieving  Affect:    Appropriate   Thought Content:  Clear , patient did not present with auditory or visual hallucinations during appointment but reports the following: \"my radio and tv tell me things, but since being on my medication the last two weeks I don't really hear them anymore.  I sometimes see lights or microscopic cells if I stare at something for too long, but I haven't been seeing those as often anymore either.  I think I can control them.\"  Thought Form:  Coherent   Insight:    Fair       Safety Issues and Plan for Safety and Risk Management:     [place] Suicide Severity Rating Scale (Lifetime/Recent)  [place] Suicide Severity Rating (Lifetime/Recent) 1/8/2022 1/8/2022 1/9/2022 1/9/2022 1/9/2022 5/23/2022 6/10/2022   Wish to be Dead (Lifetime) - - - - - - -   Comments - - - - - - -   Non-Specific Active Suicidal Thoughts (Lifetime) - - - - - - -   Q1 Wished to be Dead (Past Month) - - - - - no -   Q2 Suicidal Thoughts (Past Month) - - - - - yes -   Q3 Suicidal Thought Method - - - - - no -   Q4 Suicidal Intent without Specific Plan - - - - - no -   Q5 Suicide Intent with Specific Plan - - - - - no -   Q6 Suicide Behavior (Lifetime) - - - - - yes -   Within the Past 3 Months? - - - - - yes -   Level of Risk per Screen - - - - - high risk -   Do you have guns available to you? No No No - No - -   RETIRED: 1. Wish to be Dead (Recent) No No No No - - -   RETIRED: 2. Non-Specific Active Suicidal Thoughts (Recent) No No No No - - -   RETIRED: 3. Active Suicidal Ideation with any Methods (Not Plan) Without Intent to Act (Recent) No No No No - - -   RETIRED: Active Suicidal Ideation with any Methods (Not Plan) Description (Recent) - - - - - - -   4. Active Suicidal Ideation with Some Intent to Act, Without Specific Plan (Recent) No No No No - - -   Active Suicidal Ideation with Some Intent to Act, Without Specific Plan Description (Recent) - - - - - - - " "  RETIRED: 5. Active Suicidal Ideation with Specific Plan and Intent (Recent) No No No No - - -   RETIRED: Active Suicidal Ideation with Specific Plan and Intent Description (Recent) - - - - - - -   1. Wish to be Dead (Lifetime) - - - - - - 1   1. Wish to be Dead (Past 1 Month) - - - - - - 1   Wish to be Dead Description (Past 1 Month) - - - - - - Passive thoughts of \"it would be easier if I didn't wake up in the morning\" without plan, intent, or methods   2. Non-Specific Active Suicidal Thoughts (Lifetime) - - - - - - 0   Most Severe Ideation Rating (Lifetime) - - - - - - 3   Most Severe Ideation Rating (Past 1 Month) - - - - - - 1   Frequency (Lifetime) - - - - - - 3   Frequency (Past 1 Month) - - - - - - 2   Duration (Lifetime) - - - - - - 1   Duration (Past 1 Month) - - - - - - 1   Controllability (Lifetime) - - - - - - 2   Controllability (Past 1 Month) - - - - - - 1   Deterrents (Lifetime) - - - - - - 3   Deterrents (Past 1 Month) - - - - - - 1   Reasons for Ideation (Lifetime) - - - - - - 5   Reasons for Ideation (Past 1 Month) - - - - - - 5   Actual Attempt (Lifetime) - - - - - - 1   Total Number of Actual Attempts (Lifetime) - - - - - - 1   Actual Attempt Description (Lifetime) - - - - - - Patient reports that after her first divorce at age 21, she overdosed on Aspirin.  Patient shared that she was not intentionally trying to end her life, she was just trying to numb the pain.   Actual Attempt (Past 3 Months) - - - - - - 0   Has subject engaged in non-suicidal self-injurious behavior? (Lifetime) - - - - - - 1   Has subject engaged in non-suicidal self-injurious behavior? (Past 3 Months) - - - - - - 0   Interrupted Attempts (Lifetime) - - - - - - 0   Aborted or Self-Interrupted Attempt (Lifetime) - - - - - - 0   Preparatory Acts or Behavior (Lifetime) - - - - - - 0   Most Recent Attempt Date - - - - - - (No Data)   Actual Lethality/Medical Damage Code (Most Recent Attempt) - - - - - - 2   Potential Lethality " Code (Most Recent Attempt) - - - - - - 0   Most Lethal Attempt Date - - - - - - (No Data)   Actual Lethality/Medical Damage Code (Most Lethal Attempt) - - - - - - 2   Potential Lethality Code (Most Lethal Attempt) - - - - - - 0   Initial/First Attempt Date - - - - - - (No Data)   Actual Lethality/Medical Damage Code (Initial/First Attempt) - - - - - - 2   Potential Lethality Code (Initial/First Attempt) - - - - - - 0   Calculated C-SSRS Risk Score (Lifetime/Recent) - - - - - - Moderate Risk     Patient denies current fears or concerns for personal safety.  Patient reports the following current or recent suicidal ideation or behaviors: passive SI without plan, intent, or methods.  Patient contracted for safety.  Risk mitigated by future oriented thinking, commitment to family, job  Patient denies current or recent homicidal ideation or behaviors.  Patient denies current or recent self injurious behavior or ideation.  Patient denies other safety concerns.  Recommended that patient call 911 or go to the local ED should there be a change in any of these risk factors.       Diagnostic Criteria:  1. Persistent auditory hallucinations  2. Delusions with significant overlapping mood episodes        DSM5 Diagnoses: (Sustained by DSM5 Criteria Listed Above)  Diagnoses: 298.8  (F28) Other spec. Schizophrenia Spectrum and Other Psychotic Disorder. Previously diagnosed during 5/23/2022 hospital stay.  Psychosocial & Contextual Factors: recently moved to [place], mother passed away last year, just got  from second , 4 kids, hx of hospitalizations    Intervention:   Completed through review of safety issues and safety interventions    Collateral Reports Completed:  Not Applicable      PLAN: (Homework, other):  1. Provider will continue Diagnostic Assessment.      2. Provider recommended the following referrals: none.      3.  Suicide Risk and Safety Concerns were assessed for Christina Vásquez.      Samuel Astudillo  LGSW  Krystyna 10, 2022    This note has been reviewed and I agree with the plan of care. This note is co-signed by MARIAN Fraire, [facility], Supervisor, on: June10, 2022

## 2022-11-05 ENCOUNTER — HOSPITAL ENCOUNTER (EMERGENCY)
Facility: CLINIC | Age: 52
Discharge: PSYCHIATRIC OR SUBSTANCE USE FACILITY | End: 2022-11-10
Attending: EMERGENCY MEDICINE | Admitting: EMERGENCY MEDICINE
Payer: COMMERCIAL

## 2022-11-05 ENCOUNTER — TELEPHONE (OUTPATIENT)
Dept: BEHAVIORAL HEALTH | Facility: CLINIC | Age: 52
End: 2022-11-05

## 2022-11-05 DIAGNOSIS — F15.10 METHAMPHETAMINE ABUSE (H): ICD-10-CM

## 2022-11-05 DIAGNOSIS — F29 PSYCHOSIS, UNSPECIFIED PSYCHOSIS TYPE (H): ICD-10-CM

## 2022-11-05 LAB
ALBUMIN SERPL BCG-MCNC: 3.6 G/DL (ref 3.5–5.2)
ALP SERPL-CCNC: 87 U/L (ref 35–104)
ALT SERPL W P-5'-P-CCNC: 22 U/L (ref 10–35)
AMPHETAMINES UR QL SCN: ABNORMAL
ANION GAP SERPL CALCULATED.3IONS-SCNC: 9 MMOL/L (ref 7–15)
APAP SERPL-MCNC: <5 UG/ML (ref 10–30)
AST SERPL W P-5'-P-CCNC: 24 U/L (ref 10–35)
BARBITURATES UR QL SCN: ABNORMAL
BASOPHILS # BLD AUTO: 0.1 10E3/UL (ref 0–0.2)
BASOPHILS NFR BLD AUTO: 1 %
BENZODIAZ UR QL SCN: ABNORMAL
BILIRUB SERPL-MCNC: 0.4 MG/DL
BUN SERPL-MCNC: 18.4 MG/DL (ref 6–20)
BZE UR QL SCN: ABNORMAL
CALCIUM SERPL-MCNC: 8.8 MG/DL (ref 8.6–10)
CANNABINOIDS UR QL SCN: ABNORMAL
CHLORIDE SERPL-SCNC: 104 MMOL/L (ref 98–107)
CREAT SERPL-MCNC: 1.03 MG/DL (ref 0.51–0.95)
DEPRECATED HCO3 PLAS-SCNC: 26 MMOL/L (ref 22–29)
EOSINOPHIL # BLD AUTO: 0.4 10E3/UL (ref 0–0.7)
EOSINOPHIL NFR BLD AUTO: 4 %
ERYTHROCYTE [DISTWIDTH] IN BLOOD BY AUTOMATED COUNT: 12.1 % (ref 10–15)
GFR SERPL CREATININE-BSD FRML MDRD: 66 ML/MIN/1.73M2
GLUCOSE SERPL-MCNC: 135 MG/DL (ref 70–99)
HCG UR QL: NEGATIVE
HCT VFR BLD AUTO: 38 % (ref 35–47)
HGB BLD-MCNC: 12.5 G/DL (ref 11.7–15.7)
IMM GRANULOCYTES # BLD: 0 10E3/UL
IMM GRANULOCYTES NFR BLD: 0 %
LITHIUM SERPL-SCNC: <0.1 MMOL/L (ref 0.6–1.2)
LYMPHOCYTES # BLD AUTO: 2.3 10E3/UL (ref 0.8–5.3)
LYMPHOCYTES NFR BLD AUTO: 26 %
MCH RBC QN AUTO: 30.9 PG (ref 26.5–33)
MCHC RBC AUTO-ENTMCNC: 32.9 G/DL (ref 31.5–36.5)
MCV RBC AUTO: 94 FL (ref 78–100)
MONOCYTES # BLD AUTO: 0.4 10E3/UL (ref 0–1.3)
MONOCYTES NFR BLD AUTO: 5 %
NEUTROPHILS # BLD AUTO: 5.8 10E3/UL (ref 1.6–8.3)
NEUTROPHILS NFR BLD AUTO: 64 %
NRBC # BLD AUTO: 0 10E3/UL
NRBC BLD AUTO-RTO: 0 /100
OPIATES UR QL SCN: ABNORMAL
PLATELET # BLD AUTO: 340 10E3/UL (ref 150–450)
POTASSIUM SERPL-SCNC: 3.6 MMOL/L (ref 3.4–5.3)
PROT SERPL-MCNC: 6.4 G/DL (ref 6.4–8.3)
RBC # BLD AUTO: 4.04 10E6/UL (ref 3.8–5.2)
SARS-COV-2 RNA RESP QL NAA+PROBE: NEGATIVE
SODIUM SERPL-SCNC: 139 MMOL/L (ref 136–145)
WBC # BLD AUTO: 8.9 10E3/UL (ref 4–11)

## 2022-11-05 PROCEDURE — 81025 URINE PREGNANCY TEST: CPT | Performed by: EMERGENCY MEDICINE

## 2022-11-05 PROCEDURE — 80307 DRUG TEST PRSMV CHEM ANLYZR: CPT | Performed by: EMERGENCY MEDICINE

## 2022-11-05 PROCEDURE — 80053 COMPREHEN METABOLIC PANEL: CPT | Performed by: EMERGENCY MEDICINE

## 2022-11-05 PROCEDURE — 90791 PSYCH DIAGNOSTIC EVALUATION: CPT

## 2022-11-05 PROCEDURE — 80143 DRUG ASSAY ACETAMINOPHEN: CPT | Performed by: EMERGENCY MEDICINE

## 2022-11-05 PROCEDURE — 80179 DRUG ASSAY SALICYLATE: CPT | Performed by: EMERGENCY MEDICINE

## 2022-11-05 PROCEDURE — U0005 INFEC AGEN DETEC AMPLI PROBE: HCPCS | Performed by: EMERGENCY MEDICINE

## 2022-11-05 PROCEDURE — 36415 COLL VENOUS BLD VENIPUNCTURE: CPT | Performed by: EMERGENCY MEDICINE

## 2022-11-05 PROCEDURE — 99285 EMERGENCY DEPT VISIT HI MDM: CPT | Mod: 25

## 2022-11-05 PROCEDURE — 80178 ASSAY OF LITHIUM: CPT | Performed by: EMERGENCY MEDICINE

## 2022-11-05 PROCEDURE — C9803 HOPD COVID-19 SPEC COLLECT: HCPCS

## 2022-11-05 PROCEDURE — 85025 COMPLETE CBC W/AUTO DIFF WBC: CPT | Performed by: EMERGENCY MEDICINE

## 2022-11-05 PROCEDURE — 250N000013 HC RX MED GY IP 250 OP 250 PS 637: Performed by: EMERGENCY MEDICINE

## 2022-11-05 RX ORDER — OLANZAPINE 5 MG/1
10 TABLET, ORALLY DISINTEGRATING ORAL ONCE
Status: COMPLETED | OUTPATIENT
Start: 2022-11-05 | End: 2022-11-05

## 2022-11-05 RX ADMIN — OLANZAPINE 10 MG: 5 TABLET, ORALLY DISINTEGRATING ORAL at 16:25

## 2022-11-05 ASSESSMENT — ACTIVITIES OF DAILY LIVING (ADL)
ADLS_ACUITY_SCORE: 35

## 2022-11-05 ASSESSMENT — ENCOUNTER SYMPTOMS: HALLUCINATIONS: 1

## 2022-11-05 NOTE — CONSULTS
"Diagnostic Evaluation Consultation  Crisis Assessment    Patient was assessed: Ghada  Patient location: [facility]   Was a release of information signed: No. Reason: pt refused        Referral Data and Chief Complaint  Christina is a 52 year old, who uses she/her pronouns, and presents to the ED via EMS. Patient is referred to the ED by self. Patient is presenting to the ED for the following concerns: hallucinations, paranoia, meth abuse and suicidal ideation     Informed Consent and Assessment Methods     Patient is her own guardian. Writer met with patient and explained the crisis assessment process, including applicable information disclosures and limits to confidentiality, assessed understanding of the process, and obtained consent to proceed with the assessment. Patient was observed to be able to participate in the assessment as evidenced by alert, oriented . Assessment methods included conducting a formal interview with patient, review of medical records, collaboration with medical staff, and obtaining relevant collateral information from family and community providers when available..     Over the course of this crisis assessment provided reassurance, offered validation and provided psychoeducation. Patient's response to interventions was irritable.  She did not want to meet with writer and kept attempting to end the interview with \"camryn\" and would shut her eyes.     Summary of Patient Situation  Pt comes in due to ED via EMS due to auditory hallucinations.  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  She also has hx of Schizoaffective, bipolar type.  She was making nonsensical statements during our conversation. She endorses SI, but denies plan or intent. Denies HI and SIB.     Pt was calm, yet did not want to speak to writer so conversation was brief.  As writer would ask questions she would state, \"You should know\" " "or \"stop it\" and \"everyone around us knows what is going on here.\"  She presents w/ disorganized through process, paranoia and somewhat irritable.  When asked about Meth use or alcohol use she says, \"I may have but it's all just fake anyways.\"  \"meth is fake.\"  She would say, \"camryn\" and shut her eyes every couple of minutes as well.     Brief Psychosocial History  Pt has a daughter and used to live in [facility] with her  until she started abusing Meth.  She lost her job and  and has been struggling more significantly since then. Using meth for about 3 years     Significant Clinical History  Per chart review, pt has hx of psychosis, schizophrenia, bipolar dx and drug abuse.   She does not currently see any providers.  She reports she has been taking her Lithium.  They ordered levels in the ED.     Pt had an admission in 2022 for a few days where it took around 5 days for her psychosis to clear.  Also admitted in      Collateral Information  Attempted to reach Wendy - daughter [phone] - no answer  Chart review shows pt has been struggling since  according to family  Per last DEC assessment w/ helpful information:   Daughter reports pt is  and has a home in [place], and when she started doing Meth, she lost her job, drove to [place] when she was \"In a drug use moment\" and was hospitalized for 6 days. Daughter reports she has been driving too and from the cities from [place] and her   her due to her \"gambling, drug use, and mental health issues and her job loss\".     Concern about alcohol/drug use: Yes daughter reports pt has a problem with meth. Daughter reports pt has been using meth for 1.5-2 years and it started after pt mother .      What do you think the patient needs: Daughter reports she needs to her drug abuse under control and get mental health help. Daughter reports she believes pt self-medicates. Daughter reports she believes pt is a " "danger to herself and others.     Has patient made comments about wanting to kill themselves/others:  Yes daughter reports pt has made recent statements that she has, \"tried to hang herself in her brother's basement, but someone knocked on the door and she stopped. She said that she is glad to be living\". Daughter reports that pt will make suicidal comments one minute, and be glad to be alive for another, and feels she can't trust what pt says regarding suicidality.     If d/c is recommended, can they take part in safety/aftercare planning: Yes christaler reports she is able to emotionally support pt     Other information: Daughter reports pt is diagnosed with Bipolar disorder (diagnosed 20 years ago) and Schizophrenia (diagnosed 5 years ago). Daughter reports pt will lie about taking her medication, but, in fact, does not take her medication as prescribed. Daughter reports she went to a courthouse 2 months ago, in [place], and was given permission for an involuntary hold for placement in a mental health facility, [facility]. Daughter reports pt has been using meth for 1.5-2 years and it started after pt mother . Daughter reports that this is when pt use increased and mental health declined, and states that pt was the person that found her mother . Daughter reports she believes pt self-medicates. Daughter reports she believes pt is a danger to herself and others. Daughter reports pt has driven into random fields when high and has driven to and from [place] and the [facility]. Daughter reports pt has made comments about the radio talking, things moving, and her  found letters pt wrote to herself, \"they were really mean, using vulgar language when writing to herself\". \"There's just been a huge change in every part of her\".    Risk Assessment  ESS-6  1.a. Over the past 2 weeks, have you had thoughts of killing yourself? Yes  1.b. Have you ever attempted to kill yourself and, " "if yes, when did this last happen? No   2. Recent or current suicide plan? No   3. Recent or current intent to act on ideation? No  4. Lifetime psychiatric hospitalization? Yes  5. Pattern of excessive substance use? Yes  6. Current irritability, agitation, or aggression? Yes  Scoring note: BOTH 1a and 1b must be yes for it to score 1 point, if both are not yes it is zero. All others are 1 point per number. If all questions 1a/1b - 6 are no, risk is negligible. If one of 1a/1b is yes, then risk is mild. If either question 2 or 3, but not both, is yes, then risk is automatically moderate regardless of total score. If both 2 and 3 are yes, risk is automatically high regardless of total score.      Score: 3, moderate risk      Does the patient have access to lethal means? No     Does the patient engage in non-suicidal self-injurious behavior (NSSI/SIB)? no     Does the patient have thoughts of harming others? No     Is the patient engaging in sexually inappropriate behavior?  no        Current Substance Abuse     Is there recent substance abuse? Pt is using Meth and alcohol.      Was a urine drug screen or blood alcohol level obtained: pending        Mental Status Exam   Affect: Blunted   Appearance: Disheveled    Attention Span/Concentration: Inattentive  Eye Contact: Avoidant   Fund of Knowledge: Appropriate    Language /Speech Content: Non-fluent   Language /Speech Volume: normal, she refused to talk for much of interview, saying \"camryn\"   Language /Speech Rate/Productions: Slow    Recent Memory: Variable   Remote Memory: Variable   Mood: Depressed and Irritable    Orientation to Person: Yes    Orientation to Place: Yes   Orientation to Time of Day: No    Orientation to Date: No    Situation (Do they understand why they are here?): Yes    Psychomotor Behavior: Normal    Thought Content: Hallucinations, Paranoia and Suicidal   Thought Form: Loose Associations      History of commitment: " No    Medication    Psychotropic medications:   Albuterol  Breo ellipta  Atarax  Nicorette  Zyprexa  Klonopin  Lithium carbonate    Medication changes made in the last two weeks: No      Current Care Team    Primary Care Provider: no  Psychiatrist: no  Therapist: no  : no     CTSS or ARMHS: no  ACT Team: no  Other: no      Diagnosis  295.90  (F20.9) Schizophrenia - by history   Bipolar disorder, current episode mixed; severe with psychotic features - (F31.64)  Substance-Related & Addictive Disorders 292.89 Stimulant Intoxication,  292.9 Unspecified Stimulant Related Disorder:  F15.99) Amphetamine or otehr stimulant - primary     Clinical Summary and Substantiation of Recommendations    Pt struggling w/ auditory hallucinations, suicidal ideation and substance abuse.  At this time, she is disorganized in her thought process and paranoid.    Pt met criteria for inpatient service for further mental health stabilization, safety assessment and medication management.   Admission to Inpatient Level of Care is indicated due to:    Patient risk of severity of behavioral health disorder is appropriate to proposed level of care as indicated by:    Imminent Risk of Harm: Command auditory hallucinations or paranoid delusions contributing to risk of suicide or self harm  And/or:  Behavioral health disorder is present and appropriate for inpatient care with both of the following:   Severe psychiatric, behavioral or other comorbid conditions are appropriate for management at inpatient mental health as indicated by at least one of the following:   Hallucinations; delusions; positive symptoms, Depressive symptoms and Other psychiatric symptoms related to underlying psychiatric disorder and Comorbid substance use disorder  Severe dysfunction in daily living is present as indicated by at least one of the following:   Extreme deterioration in social interactions, Complete neglect of self care with associated impairment in  physical status and Other evidence of severe dysfunction    2. Inpatient mental health services are necessary to meet patient needs and at least one of the following:  Specific condition related to admission diagnosis is present and judged likely to deteriorate in absence of treatment at proposed level of care    3. Situation and expectations are appropriate for inpatient care, as indicated by one of the following:   Patient management/treatment at lower level of care is not feasible or is inappropriate and Biopsychosocial stresses potentially contributing to clinical presentation (co morbidities) have been assessed and are absent or manageable at proposed level of care    Disposition    Recommended disposition: Inpatient Mental Health       Reviewed case and recommendations with attending provider. Attending Name: Guilherme Wallace MD       Attending concurs with disposition: Yes       Patient concurs with disposition: Yes       Guardian concurs with disposition: NA      Final disposition: Inpatient mental health .     Inpatient Details (if applicable):   Is patient admitted voluntarily:Yes    Patient aware of potential for transfer if there is not appropriate placement? Yes     Patient is willing to travel outside of the [facility] for placement? No      Behavioral Intake Notified? Yes: Date: 11/5/2022 Time: 6:30pm.     Assessment Details    Patient interview started at: 6:00pm and completed at: 6:20pm     Total duration spent on the patient case in minutes: 1.50 hrs      CPT code(s) utilized: 86711 - Psychotherapy for Crisis - 60 (30-74*) min       TIFFANY Wolfe, TONY, [facility]  DEC - Triage & Transition Services  Callback: [phone]

## 2022-11-05 NOTE — PLAN OF CARE
"Christina Vásquez  November 5, 2022  SAFE Note    Critical Safety Issues: Pt comes in due to ED via EMS due to auditory hallucinations.  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  She also has hx of Schizoaffective, bipolar type.  She was making nonsensical statements during our conversation. She endorses SI, but denies plan or intent. Denies HI and SIB.       Current Suicidal Ideation/Self-Injurious Concerns/Methods: Cutting or ingestion       Current or Historical Inappropriate Sexual Behavior: No      Current or Historical Aggression/Homicidal Ideation: None - N/A      Triggers: Meth abuse     Guardianship Status: [facility] Guardian: is her own guardian.. Guardianship paperwork is not required.    This patient is a child/adolescent: No    This patient has additional special visitor precautions: No    Updated care team: Yes: Writer called [facility] for bed location and spoke to ED provider.     For additional details see full [facility] assessment.       TIFFANY Wolfe, [facility]        "

## 2022-11-05 NOTE — ED NOTES
Pt up to bathroom, maintained a steady gait. Pt took medication without issues and provided with boxed lunch as requested.

## 2022-11-05 NOTE — ED TRIAGE NOTES
"Pt arrives via EMS with c/o hallucinations. Pt called 911 herself requesting help \"to quiet the senses\". Pt calm and cooperative, does appear to be responding to internal stimuli during triage process.      Triage Assessment     Row Name 11/05/22 3103       Triage Assessment (Adult)    Airway WDL WDL       Respiratory WDL    Respiratory WDL WDL       Skin Circulation/Temperature WDL    Skin Circulation/Temperature WDL WDL       Cardiac WDL    Cardiac WDL WDL       Peripheral/Neurovascular WDL    Peripheral Neurovascular WDL WDL       Cognitive/Neuro/Behavioral WDL    Cognitive/Neuro/Behavioral WDL WDL              "

## 2022-11-05 NOTE — ED PROVIDER NOTES
"  History   Chief Complaint:  Mental Health Problem       The history is provided by the patient and the EMS personnel.   HPI is supplemented by triage note.     Christina Vásquez is a 52 year old female with history of bipolar affective disorder, psychosis, amphetamine abuse, psychosis, anxiety, depression, among others, who presents with a mental health problem that has been ongoing for an uncertain period of time. Per triage note, Christina has been having hallucinations and called 911 herself and requested help to \"quiet the senses.\" While in the ED, the patient reports experiencing \"craziness.\" She mentions that light, electricity, and sound have become overwhelming. She notes that she is experiencing sensory issues that have been worsening, as she is having difficulty organizing her senses. Denies thoughts of self injury, but does report wishing that she could start her life over. When asked if she had used methamphetamine she replied \"I am not sure if that is real.\" She notes that she has been taking lithium. Christina mentions that she wants help resolving her symptoms.    Review of Systems   Psychiatric/Behavioral: Positive for hallucinations. Negative for self-injury.        Mental health problem (+)   All other systems reviewed and are negative.  Limited 2/2 acute psychosis.     Allergies:  Penicillins    Medications:  Albuterol  Breo ellipta  Atarax  Nicorette  Zyprexa  Klonopin  Lithium carbonate    Past Medical History:     Psychosis  Bipolar affective disorder, currently manic, severe  Amphetamine abuse  Agitation  Psychosis  Asthma  Anxiety   Depression   Nicotine dependence     Family History:    Daughter(s): ovarian cancer    Social History:  The patient presents to the ED alone.  The patient presents to the ED via EMS.  PCP: No Ref-Primary, Physician     Physical Exam     Patient Vitals for the past 24 hrs:   BP Temp Temp src Pulse Resp SpO2   11/05/22 1508 -- 97.4  F (36.3  C) Temporal -- -- --   11/05/22 1452 " (!) 147/84 -- -- 71 20 100 %     Physical Exam:  Nursing note and vitals reviewed.  Head:  The scalp, face, and head appear normal  Eyes:  Conjunctivae are normal  ENT:    The nose is normal    Pinnae are normal  Neck:  Trachea midline  CV:  Normal rate  Resp:  No respiratory distress   Musc:  Normal muscular tone  Skin:  No rash or lesions noted  Neuro: Speech is normal and fluent. Face is symmetric. Moving all extremities well.   Psych:  Awake. Alert.  No evident SI/HI. Poor insight/judgment. Paranoid nonbizarre delusions noted.       Emergency Department Course     Laboratory:  Labs Ordered and Resulted from Time of ED Arrival to Time of ED Departure   COMPREHENSIVE METABOLIC PANEL - Abnormal       Result Value    Sodium 139      Potassium 3.6      Chloride 104      Carbon Dioxide (CO2) 26      Anion Gap 9      Urea Nitrogen 18.4      Creatinine 1.03 (*)     Calcium 8.8      Glucose 135 (*)     Alkaline Phosphatase 87      AST 24      ALT 22      Protein Total 6.4      Albumin 3.6      Bilirubin Total 0.4      GFR Estimate 66     LITHIUM LEVEL - Abnormal    Lithium <0.1 (*)    ACETAMINOPHEN LEVEL - Abnormal    Acetaminophen <5.0 (*)    DRUG ABUSE SCREEN 1 URINE (ED) - Abnormal    Amphetamines Urine Screen Positive (*)     Barbituates Urine Screen Negative      Benzodiazepine Urine Screen Negative      Cannabinoids Urine Screen Negative      Cocaine Urine Screen Positive (*)     Opiates Urine Screen Negative     HCG QUALITATIVE URINE - Normal    hCG Urine Qualitative Negative     COVID-19 VIRUS (CORONAVIRUS) BY PCR - Normal    SARS CoV2 PCR Negative     CBC WITH PLATELETS AND DIFFERENTIAL    WBC Count 8.9      RBC Count 4.04      Hemoglobin 12.5      Hematocrit 38.0      MCV 94      MCH 30.9      MCHC 32.9      RDW 12.1      Platelet Count 340      % Neutrophils 64      % Lymphocytes 26      % Monocytes 5      % Eosinophils 4      % Basophils 1      % Immature Granulocytes 0      NRBCs per 100 WBC 0      Absolute  Neutrophils 5.8      Absolute Lymphocytes 2.3      Absolute Monocytes 0.4      Absolute Eosinophils 0.4      Absolute Basophils 0.1      Absolute Immature Granulocytes 0.0      Absolute NRBCs 0.0        Emergency Department Course:  Mental Health Risk Assessment      PSS-3    Date and Time Over the past 2 weeks have you felt down, depressed, or hopeless? Over the past 2 weeks have you had thoughts of killing yourself? Have you ever attempted to kill yourself? When did this last happen? User   11/05/22 1453 yes yes yes refused MRN      C-SSRS ([place])    Date and Time Q1 Wished to be Dead (Past Month) Q2 Suicidal Thoughts (Past Month) Q3 Suicidal Thought Method Q4 Suicidal Intent without Specific Plan Q5 Suicide Intent with Specific Plan Q6 Suicide Behavior (Lifetime) Within the Past 3 Months? RETIRED: Level of Risk per Screen Screening Not Complete User   11/05/22 1511 -- -- -- -- -- -- -- -- Refuses to answer MRN            Suicide assessment completed by mental health (D.E.C., LCSW, etc.)    Reviewed:  I reviewed nursing notes, vitals, past medical history and Care Everywhere    Assessments:  1700 I obtained history and examined the patient as noted above.     Consults:  1815 I spoke with D.E.C. regarding the patient's presentation and plan of care.    Interventions:  1625 Zyprexa 10 mg PO    Disposition:  Care of the patient was transferred to my colleague Dr. Garcia pending mental health bed availability.     Impression & Plan     Medical Decision Making:  Pt presents via EMS. She's reporting feeling exhausted by ongoing hallucinations, both visual and auditory. Reports she's taking lithium, but level undetectable. Appeared to respond well to olanzapine BID during prior hospitalization this year, so will restart this. UDS positive for amphetamines & cocaine. Certainly drug-induced psychosis possible. Prior admissions have required between 3-5 days for mental status to clear. DEC evaluated pt and are  recommending admission. Discussed re-evaluating pt if her mental status is clearing with meds&time. She is on a 72 hour hold awaiting a mental health bed. She is signed out to Dr. Garcia pending bed availability.  boarding set initiated.     Diagnosis:    ICD-10-CM    1. Psychosis, unspecified psychosis type (H)  F29       2. Methamphetamine abuse (H)  F15.10         Scribe Disclosure:  I, Leon Hansen, am serving as a scribe at 4:05 PM on 11/5/2022 to document services personally performed by Guilherme Wallace MD based on my observations and the provider's statements to me.        Guilherme Wallace MD  11/06/22 0147

## 2022-11-05 NOTE — ED NOTES
"Patient refused to change into behavior scrubs and she stated \"I'd rather stay in my comfy sweatshirt\". Pt searched by writer, shoes, belt, lighter, and 2$ bill placed in belongings bag with patient label and placed in DEC office.   "

## 2022-11-05 NOTE — TELEPHONE ENCOUNTER
S: [facility] ED, DEC  Dana calling at 6:21 PM.  52 year old/Female presenting with SI and AH     B: Pt arrived via EMS. Pt presents with substance induced psychosis, SI, and AH. Patient endorses AH as  bothersome non-stop noises. Pt find it difficult to function. Patient endorses SI and not wanting to live because of voices. Patient has delusional thought.   Pt affect in ED: disorganized, irritable, not angry, flat  Pt Dx: Schizoaffective bipolar type and methamphetamine abuse   Previous IPMH hx? Yes: 2 in last couple of years 4-5 days at a time  Pt endorses SI. Pt denies SIB.   Pt denies HI. Pt endorses hallucinations.   Hx of suicide attempt? No  Hx of aggression, or current concerns for aggression this visit? No  Pt is prescribed medication. Pt has unknown medication compliant  Pt denies OP services: none, does not attend   CD concerns: methamphetamines and etoh use  Acute medical concerns: none  Does Pt present with any of the following: assistive devices, insulin pump, J/G tube, catheter, CPAP, continuous IV, continuous O2, bariatric needs, ADA needs? No  Is Pt their own guardian? Yes   Pt is ambulatory  Pt is  able to perform ADLs independently      A: Pt meets criteria for review for [facility] admission. Patient is voluntary. Preferred placement: [facility]  COVID: In process  Utox: Positive for amphetamines, cocaine  CMP: Abnormalities: creatinine 1.03, glucose 135  CBC: WNL  HCG: Negative    R: Patient cleared and ready for behavioral bed placement: Yes  Pt placed on [facility] worklist, Intake searching for appropriate bed placement for patient review.    R: The pt is currently in the [facility] ER awaiting voluntary placement for [facility] preference.     Intake Evening Bed Search (Done at 6:31 PM)  [facility] is posting 0 beds.   [facility] is posting 0 beds.  [facility] is posting 0 beds.  [facility] is posting 0 beds.  [facility] is posting 0 beds.  [facility] is posting 0 beds.  [facility] is posting 0  beds.      [facility] location at capacity. The pt remains on the waitlist. Intake continues to identify appropriate bed placement.

## 2022-11-06 ENCOUNTER — TELEPHONE (OUTPATIENT)
Dept: BEHAVIORAL HEALTH | Facility: CLINIC | Age: 52
End: 2022-11-06

## 2022-11-06 PROCEDURE — 250N000013 HC RX MED GY IP 250 OP 250 PS 637: Performed by: EMERGENCY MEDICINE

## 2022-11-06 RX ORDER — OLANZAPINE 10 MG/1
10 TABLET ORAL 2 TIMES DAILY
Status: DISCONTINUED | OUTPATIENT
Start: 2022-11-06 | End: 2022-11-10 | Stop reason: HOSPADM

## 2022-11-06 RX ORDER — HYDROXYZINE HYDROCHLORIDE 25 MG/1
50 TABLET, FILM COATED ORAL EVERY 6 HOURS PRN
Status: DISCONTINUED | OUTPATIENT
Start: 2022-11-06 | End: 2022-11-10 | Stop reason: HOSPADM

## 2022-11-06 RX ORDER — LITHIUM CARBONATE 300 MG/1
300 CAPSULE ORAL 2 TIMES DAILY
Status: ON HOLD | COMMUNITY
Start: 2022-10-12 | End: 2022-11-21

## 2022-11-06 RX ORDER — LITHIUM CARBONATE 300 MG/1
300 CAPSULE ORAL 2 TIMES DAILY
Status: DISCONTINUED | OUTPATIENT
Start: 2022-11-06 | End: 2022-11-10 | Stop reason: HOSPADM

## 2022-11-06 RX ORDER — NICOTINE 21 MG/24HR
1 PATCH, TRANSDERMAL 24 HOURS TRANSDERMAL DAILY
Status: DISCONTINUED | OUTPATIENT
Start: 2022-11-06 | End: 2022-11-10 | Stop reason: HOSPADM

## 2022-11-06 RX ADMIN — OLANZAPINE 10 MG: 10 TABLET, FILM COATED ORAL at 11:25

## 2022-11-06 RX ADMIN — NICOTINE 1 PATCH: 21 PATCH, EXTENDED RELEASE TRANSDERMAL at 11:25

## 2022-11-06 RX ADMIN — LITHIUM CARBONATE 300 MG: 300 CAPSULE, GELATIN COATED ORAL at 19:54

## 2022-11-06 RX ADMIN — LITHIUM CARBONATE 300 MG: 300 CAPSULE, GELATIN COATED ORAL at 11:25

## 2022-11-06 RX ADMIN — OLANZAPINE 10 MG: 10 TABLET, FILM COATED ORAL at 19:54

## 2022-11-06 ASSESSMENT — ACTIVITIES OF DAILY LIVING (ADL)
ADLS_ACUITY_SCORE: 35

## 2022-11-06 NOTE — ED NOTES
[facility] ED Mental Health Handoff Note:       Brief HPI:  This is a 52 year old female signed out to me by Dr. Garcia.  See initial ED Provider note for full details of the presentation. Interval history is pertinent for the completion of her Med Rec.     Home meds reviewed and ordered/administered: Yes    Medically stable for inpatient mental health admission: Yes.    Evaluated by mental health: Yes. The recommendation is for inpatient mental health treatment. Bed search in process    Safety concerns: At the time I received sign out, there were no safety concerns.    Hold Status:  Active Orders   Legal    Emergency Hospitalization Hold (72 Hr Hold)     Frequency: Effective Now     Start Date/Time: 11/06/22 0127      Number of Occurrences: Until Specified    Health Officer Authority to Detain (HEATHER)     Frequency: Effective Now     Start Date/Time: 11/05/22 1456      Number of Occurrences: Until Specified     Order Comments: Pt reports auditory and visual hallucinations. Pt reports suicidal thoughts but denies any current plan or intent.         Exam:   Patient Vitals for the past 24 hrs:   BP Pulse Resp SpO2   11/06/22 1508 (!) 154/74 78 12 97 %           ED Course:    Medications   OLANZapine (zyPREXA) tablet 10 mg (10 mg Oral Given 11/6/22 1125)   hydrOXYzine (ATARAX) tablet 50 mg (has no administration in time range)   lithium capsule 300 mg (300 mg Oral Given 11/6/22 1125)   nicotine Patch in Place ( Transdermal Patch in Place 11/6/22 1518)   nicotine (NICODERM CQ) 21 MG/24HR 24 hr patch 1 patch (1 patch Transdermal Patch/Med Applied 11/6/22 1125)   OLANZapine zydis (zyPREXA) ODT tab 10 mg (10 mg Oral Given 11/5/22 1625)            There were no significant events during my shift.    Patient was signed out to the oncoming provider, Dr. Wallace      Impression:    ICD-10-CM    1. Psychosis, unspecified psychosis type (H)  F29       2. Methamphetamine abuse (H)  F15.10           Plan:    1. Awaiting  Care Management Follow Up    Length of Stay (days): 14    Expected Discharge Date: 08/05/2022     Concerns to be Addressed:       Patient plan of care discussed at interdisciplinary rounds: Yes    Anticipated Discharge Disposition: TCU      Anticipated Discharge Services:  (OT, RN)  Anticipated Discharge DME:  (per treatment team)    Patient/family educated on Medicare website which has current facility and service quality ratings:  (not applicable)  Education Provided on the Discharge Plan:    Patient/Family in Agreement with the Plan:      Referrals Placed by CM/SW: TCU referrals pending [employer]   Private pay costs discussed: No  Additional Information:  [employer] prior authorization completed for the new tcu referrals. See Destination tab for detail of tcu referrals.    3:30pm  Pt is off covid19 precautions. Writer talked to Astrid at [facility] who states that pt is not participating in PT therefore facility will not be able to accept him. Astrid reports that today and prior days pt has been declining PT treatment per her Epic access.  Writer unable to see PT notes. Consulted with Charge RN who called PT Dept to check Epic for PT notes as Charge RN not able to see notes. PT Dept stated that pt has not been participating. Writer put a call to Camilla the dtr at [phone]. Explained the situation and asked if she and family could inspire pt to participate in PT. Dtr reports that family and pt will have a care conference with Care Management Dept Manager tomorrow at 9:30am to discuss this situation.      Zac Avelar       inpatient mental health admission/transfer.      RESULTS:   Results for orders placed or performed during the hospital encounter of 11/05/22 (from the past 24 hour(s))   Urine Drugs of Abuse Screen     Status: Abnormal    Collection Time: 11/05/22  5:16 PM    Narrative    The following orders were created for panel order Urine Drugs of Abuse Screen.  Procedure                               Abnormality         Status                     ---------                               -----------         ------                     Drug abuse screen 1 urin...[[id number]]  Abnormal            Final result                 Please view results for these tests on the individual orders.   HCG qualitative urine     Status: Normal    Collection Time: 11/05/22  5:16 PM   Result Value Ref Range    hCG Urine Qualitative Negative Negative   Asymptomatic COVID-19 Virus (Coronavirus) by PCR Nasopharyngeal     Status: Normal    Collection Time: 11/05/22  5:16 PM    Specimen: Nasopharyngeal; Swab   Result Value Ref Range    SARS CoV2 PCR Negative Negative    Narrative    Testing was performed using the Xpert Xpress SARS-CoV-2 Assay on the   Cepheid Gene-Xpert Instrument Systems. Additional information about   this Emergency Use Authorization (EUA) assay can be found via the Lab   Guide. This test should be ordered for the detection of SARS-CoV-2 in   individuals who meet SARS-CoV-2 clinical and/or epidemiological   criteria. Test performance is unknown in asymptomatic patients. This   test is for in vitro diagnostic use under the FDA EUA for   laboratories certified under CLIA to perform high complexity testing.   This test has not been FDA cleared or approved. A negative result   does not rule out the presence of PCR inhibitors in the specimen or   target RNA in concentration below the limit of detection for the   assay. The possibility of a false negative should be considered if   the patient's recent exposure or clinical presentation  suggests   COVID-19. This test was validated by the [facility]. This laboratory is certified under the Clinical Laboratory Improvement Amendments of 1988 (CLIA-88) as qualified to perform high complexity laboratory testing.     Drug abuse screen 1 urine (ED)     Status: Abnormal    Collection Time: 11/05/22  5:16 PM   Result Value Ref Range    Amphetamines Urine Screen Positive (A) Screen Negative    Barbituates Urine Screen Negative Screen Negative    Benzodiazepine Urine Screen Negative Screen Negative    Cannabinoids Urine Screen Negative Screen Negative    Cocaine Urine Screen Positive (A) Screen Negative    Opiates Urine Screen Negative Screen Negative   CBC + differential     Status: None    Collection Time: 11/05/22  5:29 PM    Narrative    The following orders were created for panel order CBC + differential.  Procedure                               Abnormality         Status                     ---------                               -----------         ------                     CBC with platelets and d...[[id number]]                      Final result                 Please view results for these tests on the individual orders.   Comprehensive metabolic panel     Status: Abnormal    Collection Time: 11/05/22  5:29 PM   Result Value Ref Range    Sodium 139 136 - 145 mmol/L    Potassium 3.6 3.4 - 5.3 mmol/L    Chloride 104 98 - 107 mmol/L    Carbon Dioxide (CO2) 26 22 - 29 mmol/L    Anion Gap 9 7 - 15 mmol/L    Urea Nitrogen 18.4 6.0 - 20.0 mg/dL    Creatinine 1.03 (H) 0.51 - 0.95 mg/dL    Calcium 8.8 8.6 - 10.0 mg/dL    Glucose 135 (H) 70 - 99 mg/dL    Alkaline Phosphatase 87 35 - 104 U/L    AST 24 10 - 35 U/L    ALT 22 10 - 35 U/L    Protein Total 6.4 6.4 - 8.3 g/dL    Albumin 3.6 3.5 - 5.2 g/dL    Bilirubin Total 0.4 <=1.2 mg/dL    GFR Estimate 66 >60 mL/min/1.73m2   Lithium level     Status: Abnormal    Collection Time: 11/05/22  5:29 PM   Result Value Ref Range    Lithium  <0.1 (L) 0.6 - 1.2 mmol/L   Acetaminophen level     Status: Abnormal    Collection Time: 11/05/22  5:29 PM   Result Value Ref Range    Acetaminophen <5.0 (L) 10.0 - 30.0 ug/mL   CBC with platelets and differential     Status: None    Collection Time: 11/05/22  5:29 PM   Result Value Ref Range    WBC Count 8.9 4.0 - 11.0 10e3/uL    RBC Count 4.04 3.80 - 5.20 10e6/uL    Hemoglobin 12.5 11.7 - 15.7 g/dL    Hematocrit 38.0 35.0 - 47.0 %    MCV 94 78 - 100 fL    MCH 30.9 26.5 - 33.0 pg    MCHC 32.9 31.5 - 36.5 g/dL    RDW 12.1 10.0 - 15.0 %    Platelet Count 340 150 - 450 10e3/uL    % Neutrophils 64 %    % Lymphocytes 26 %    % Monocytes 5 %    % Eosinophils 4 %    % Basophils 1 %    % Immature Granulocytes 0 %    NRBCs per 100 WBC 0 <1 /100    Absolute Neutrophils 5.8 1.6 - 8.3 10e3/uL    Absolute Lymphocytes 2.3 0.8 - 5.3 10e3/uL    Absolute Monocytes 0.4 0.0 - 1.3 10e3/uL    Absolute Eosinophils 0.4 0.0 - 0.7 10e3/uL    Absolute Basophils 0.1 0.0 - 0.2 10e3/uL    Absolute Immature Granulocytes 0.0 <=0.4 10e3/uL    Absolute NRBCs 0.0 10e3/uL             DO Abhay Pastor Bradley Joseph, DO  11/06/22 1536

## 2022-11-06 NOTE — ED NOTES
Triage & Transition Services, Extended Care     Christina Vásquez  November 6, 2022    Christina is followed related to Long wait time for admission: 26+ hours. Please see initial DEC Crisis Assessment for complete assessment information. Medical record is reviewed. While patient is in the ED, care team is working towards Learn and Demonstrate at [facility] [employer] Skill Focused on Crisis Stabilization.     Additional notes include: Patient presented to ED with Utox positive for cocaine and methamphetamine and Lithium blood level of zero. Per DEC , patient presented with hallucinations, disorganized speech, and ideas of reference. Per FARIDEH Guo, patient has been taking Lithium in ED and has been calm and cooperative overnight and in the morning. Per FARIDEH Cortez, the patient continued to sleep through the afternoon. This writer stopped by patient room this afternoon multiple times and was unable to meet with patient due to patient being asleep every time.     There are not significant status changes. Patient is on a 72-hour hold placed by [facility] medical provider.      Adult Psychiatry Consult order placed for medication evaluation and recommendations, as well as Examiner's Statement with notarized neuroleptic medication statement if psychiatric provider assess's patient at appropriate for MICD commitment petition.     Recommend ED care team continue care coordination with  Intake and [facility] Psychiatry team.   If patient gives permission, ED care team might continue to coordinate with patient's daughter who provided collateral information to DEC  for initial crisis assessment.      Plan: Continue to recommend Adult IP MH admission for patient safety and stabilization due to acute psychosis with medication noncompliance, active drug use, and high risk of danger to self and others. Recommend continue 72HH at this time and reassess daily for feasibility of less restrictive treatment options, possibly including referral to IOP  or residential treatment programming for co-occurring MH + GERALDO.     Plan for Care reviewed with Assigned Medical Provider? Yes. Provider, Guilherme Wallace MD, response: concurs with plan.     Extended Care will follow and meet with patient/family/care team as able or requested.     KAYCE POLO M.Ed., [facility], [facility]  Licensed Mental Health Professional  Triage and Transition Services - Extended Care [phone]    [facility] workstation [phone]  [facility] iPhone [phone]

## 2022-11-06 NOTE — TELEPHONE ENCOUNTER
No appropriate beds currently available within the  system. Bed search update ([facility]) @ 00:12:        [facility]: @ cap per website      [facility]: @ cap per website      [facility]: @ cap per website      [facility]: @ cap per website      [facility]: @ cap per website      [facility]: @ cap per website    Pt remains on work list until appropriate placement is available

## 2022-11-06 NOTE — ED NOTES
Pt sleeping most of the morning. Took morning meds. Ate lunch. Pt wanting update on POC. Informed pt waiting mental health assessment. Pt verbalized understanding. Pt calm and cooperative.

## 2022-11-06 NOTE — TELEPHONE ENCOUNTER
R:  Patient cleared and ready for behavioral bed placement: Yes     Bed Search Update Adults:     [facility]-No beds available     [facility] (to include [facility]   , [place], and [facility])  - No beds available      [facility]-No beds available     [facility]-No beds available     Pt remains on Adult worklist

## 2022-11-06 NOTE — PHARMACY-ADMISSION MEDICATION HISTORY
Admission medication history interview status for this patient is complete. See [facility] admission navigator for allergy information, prior to admission medications and immunization status.     Medication history interview done, indicate source(s): Patient  Medication history resources (including written lists, pill bottles, clinic record):[employer]  Pharmacy: [facility] #[id number]    Changes made to PTA medication list:  Added: Lithium  Changed: none  Reported as Not Taking: none  Removed: albuterol inhaler, Breo Ellipta, MTV, hydroxyzine, olanzapine    Actions taken by pharmacist (provider contacted, etc):Spoke with ED provider     Additional medication history information: Denied any other medications (including RX/OTC medications).     Lithium - pt reports she has only been taking 1 capsule daily, although is prescribed 2/day. She seemed confused by how she should be taking the medication, stated she would have to read the bottle but confirmed she has only been taking 1 capsule daily.     Medication reconciliation/reorder completed by provider prior to medication history?  N   (Y/N)     Prior to Admission medications    Medication Sig Last Dose Taking? Auth Provider Long Term End Date   lithium 300 MG capsule Take 300 mg by mouth 2 times daily   Unknown, Entered By History Yes           Attending Attestation (For Attendings USE Only)...

## 2022-11-06 NOTE — PROVIDER NOTIFICATION
Addended by: PALLAVI HARRINGTON on: 6/22/2022 08:30 AM     Modules accepted: Orders     Pharmacy was consulted to perform a medication history on this patient boarding in the [facility] ED. RN/Provider was notified that we are unable to perform this consult until after 0730 on 11/6/22. Disposition: RN/Provider will review any PTA medications with patient that may be due overnight.  ABC, RPh

## 2022-11-07 ENCOUNTER — TELEPHONE (OUTPATIENT)
Dept: BEHAVIORAL HEALTH | Facility: CLINIC | Age: 52
End: 2022-11-07

## 2022-11-07 LAB — SALICYLATES SERPL-MCNC: 1.3 MG/DL

## 2022-11-07 PROCEDURE — 250N000013 HC RX MED GY IP 250 OP 250 PS 637: Performed by: EMERGENCY MEDICINE

## 2022-11-07 RX ADMIN — LITHIUM CARBONATE 300 MG: 300 CAPSULE, GELATIN COATED ORAL at 16:20

## 2022-11-07 RX ADMIN — LITHIUM CARBONATE 300 MG: 300 CAPSULE, GELATIN COATED ORAL at 21:00

## 2022-11-07 RX ADMIN — OLANZAPINE 10 MG: 10 TABLET, FILM COATED ORAL at 20:59

## 2022-11-07 ASSESSMENT — ACTIVITIES OF DAILY LIVING (ADL)
ADLS_ACUITY_SCORE: 35

## 2022-11-07 NOTE — ED NOTES
Intake called in regards to pt placement at [facility], pending paperwork. Will continue to monitor for paperwork at this time

## 2022-11-07 NOTE — TELEPHONE ENCOUNTER
R:  4:41 PM Nneka with [facility] updated that Kristopher Higgins declined Pt due to Pts acuity level for today.  He is welcome to be resubmitted tomorrow.

## 2022-11-07 NOTE — TELEPHONE ENCOUNTER
R: The pt is currently in the [facility] ER awaiting placement.     8:36a Intake reviewed the pt's chart. The pt was placed on a hold 11/6/2022 at 1:27a. Intake updated the work-list to reflect the pt is on a hold. Pt is experiencing psychosis, UDS is positive for methamphetamine and cocaine. Per Assessment MICD is recommended.  Per assessment the pt is at high risk for danger to self and others.     Intake Morning Bed Search (Done at 8:00am)  [facility] is posting 0 beds.   [facility] is posting 0 beds.  [facility] is posting 0 beds.  [facility] is posting 0 beds.  [facility] is posting 0 beds.  [facility] is posting 0 beds.  [facility] is posting 0 beds.  [facility] is posting 0 beds. Low acuity.     [facility] is posting 1 bed. Mixed unit 12+. Low acuity only. Pt does not fit open bed available.  [facility] is positing 0 beds. No aggression.   [facility] is posting 0 beds.   [facility] is posting 0 beds. Low acuity only.  [facility] is posting 1 bed. Low acuity only. Pt does not fit open bed available.  [facility] is posting 0 beds. 0 adult acute, 0 med psych, 0 mood disorder. very Low acuity only.  [facility] is posting 0 beds. Low acuity only. 72 hr hold required.   [facility] is posting 2 beds. Additional labs needed for bed review- Silicate.      [facility] is posting 0 beds. Vol only, No Hx of aggression, violence, or assault. No sexual offenders. No 72 hr holds.    [facility] is posting 5 beds. (Must have the cognitive ability to do programming. No aggressive or violent behavior or recent HX in the last 2 yrs. MH must be primary). Pt does not fit open bed available.  [facility] ([facility] is posting 0 beds. Low acuity only. Violence and aggression capped.   [facility] is posting 0 beds. Low acuity, Neg Covid.   [facility] is posting 0 beds. Covid neg. Vol only. Combined adolescent and  adult unit. No aggressive or violent behavior. No registered sex offenders.   [facility] is posting 4 beds. No Covid needed. Call for details. Pt does not fit open bed available due to 72 HH.  [facility] is posting 4 beds. Mixed Unit (13+). Low acuity. Pt does not fit open bed available.    8:45a Intake called [facility] CAST RN. RN is Karla. Intake requested lab to be ordered for bed review at [facility]. ER RN will request the lab. Intake awaiting lab for bed review.     1:09p Intake called the [place] provider to review the pt for 5NS (Jemal MACIEL). Per provider declined due to acuity- the pt would need an ICU bed. No ICU bed available. [facility] capacity. The pt remains on the waitlist. Intake continues to identify appropriate bed placement.    Intake afternoon bed search done at 3:05p   [facility] is posting 0 beds.   [facility] is posting 0 beds.  [facility] is posting 0 beds.  [facility] is posting 0 beds.  [facility] is posting 0 beds.  [facility] is posting 0 beds.  [facility] is posting 0 beds.  [facility] is posting 0 beds. Low acuity.     [facility] is posting 1 bed. Mixed unit 12+. Low acuity only. Pt does not fit open bed available.  [facility] is positing 0 beds. No aggression.   [facility] is posting 0 beds.   [facility] is posting 0 beds. Low acuity only.  [facility] is posting 0 beds. Low acuity only. Pt does not fit open bed available.  [facility] is posting 0 beds. 0 adult acute, 0 med psych, 0 mood disorder. very Low acuity only.  [facility] is posting 0 beds. Low acuity only. 72 hr hold required.   [facility] is posting 1 bed.     [facility] is posting 0 beds. Vol only, No Hx of aggression, violence, or assault. No sexual offenders. No 72 hr holds.    [facility] is posting 5 beds. (Must have the cognitive ability to do programming. No aggressive or violent behavior or recent HX in  the last 2 yrs.  must be primary). Pt does not fit open bed available.  [facility] is posting 0 beds. Low acuity only. Violence and aggression capped.   [facility] is posting 0 beds. Low acuity, Neg Covid.   [facility] is posting 0 beds. Covid neg. Vol only. Combined adolescent and adult unit. No aggressive or violent behavior. No registered sex offenders.   [facility] is posting 6 beds. No Covid needed. Call for details. Pt does not fit open bed available due to 72 HH.  [facility] is posting 0 beds. Mixed Unit (13+). Low acuity. Pt does not fit open bed available.    3:09p Intake called [place] (Ze[facility])- facility can review. Facility does not need clinical- they have Epic access. Facility requests psych assessment form. Intake awaiting review.     3:15p Intake called Erika ER RN to request they complete the [facility] Review form once received. ER RN will complete the form and fax it to [facility]. Intake awaiting review.     3:53p Intake called [facility] to confirm psych assessment form has been received. [facility] has not received the Psych Assessment Form.     3:53p Intake called  Erika ER RN (Karla) for an update on the pt's psych review form from [facility]. Per RN they will complete the form and fax it to [facility]. Intake awaiting review.

## 2022-11-07 NOTE — TELEPHONE ENCOUNTER
No appropriate beds currently available within the  system. Bed search update (metro) @ 23:43:         [facility]: @ cap per website      [facility]: @ cap per website      [facility]: @ cap per website      : @ cap per website      [facility]: @ cap per website      [facility]: @ cap per website     Pt remains on work list until appropriate placement is available

## 2022-11-08 ENCOUNTER — TELEPHONE (OUTPATIENT)
Dept: BEHAVIORAL HEALTH | Facility: CLINIC | Age: 52
End: 2022-11-08

## 2022-11-08 PROCEDURE — 250N000013 HC RX MED GY IP 250 OP 250 PS 637: Performed by: EMERGENCY MEDICINE

## 2022-11-08 PROCEDURE — 250N000013 HC RX MED GY IP 250 OP 250 PS 637

## 2022-11-08 PROCEDURE — 99283 EMERGENCY DEPT VISIT LOW MDM: CPT | Mod: 25

## 2022-11-08 RX ORDER — HALOPERIDOL 5 MG/1
5 TABLET ORAL EVERY 6 HOURS PRN
Status: DISCONTINUED | OUTPATIENT
Start: 2022-11-08 | End: 2022-11-10 | Stop reason: HOSPADM

## 2022-11-08 RX ORDER — HALOPERIDOL 5 MG/ML
5 INJECTION INTRAMUSCULAR EVERY 6 HOURS PRN
Status: DISCONTINUED | OUTPATIENT
Start: 2022-11-08 | End: 2022-11-10 | Stop reason: HOSPADM

## 2022-11-08 RX ADMIN — NICOTINE 1 PATCH: 21 PATCH, EXTENDED RELEASE TRANSDERMAL at 07:37

## 2022-11-08 RX ADMIN — HALOPERIDOL 5 MG: 5 TABLET ORAL at 08:50

## 2022-11-08 RX ADMIN — OLANZAPINE 10 MG: 10 TABLET, FILM COATED ORAL at 23:21

## 2022-11-08 RX ADMIN — LITHIUM CARBONATE 300 MG: 300 CAPSULE, GELATIN COATED ORAL at 23:21

## 2022-11-08 RX ADMIN — LITHIUM CARBONATE 300 MG: 300 CAPSULE, GELATIN COATED ORAL at 07:37

## 2022-11-08 RX ADMIN — OLANZAPINE 10 MG: 10 TABLET, FILM COATED ORAL at 07:37

## 2022-11-08 ASSESSMENT — ACTIVITIES OF DAILY LIVING (ADL)
ADLS_ACUITY_SCORE: 35

## 2022-11-08 NOTE — ED NOTES
"Triage & Transition Services, Extended Care     Therapy Progress Note    Patient: Chritsina goes by \"Christina,\" uses she/her pronouns  Date of Service: November 7, 2022  Site of Service: [place] [address]  Patient was seen in-person.     Presenting problem:   Christina is followed related to Long wait time for admission: 50+ hours and 72 Hour Hold: clarify patient's status. Please see initial DEC/LM Crisis Assessment for complete assessment information. Notable concerns include SI, PHILLIPS, psychosis.     Individuals Present: Christina & Karie Perez M.Ed., [facility], LADC  Session start: 4:45PM  Session end: 5:00PM  Session duration in minutes: 15  Session number: 1  Anticipated number of sessions or this episode of care: 1-4  CPT utilized: non-billable    Current Presentation:   Met with patient today when she woke up from sleeping most of yesterday and today. Patient was sitting up in bed, dressed in own clothes, disheveled. Patient asked if her food was coming. RN appeared with meal tray about 5 minutes into interview, and patient proceeded to try each item and say \"This is disgusting.\" Patient requested a cookie from RN upon seeing meal tray, and again requested \"a cookie or something\" from this writer at end of interview saying \"this food is all disgusting.\"   Meanwhile, checked in with patient about how she is and if she has experienced any improvement since she first came to ED. Patient stated irritably that she wants to leave. Patient stated that she wants \"to start fresh with a new doctor.\" Patient handed this writer a \"letter\" that she reported she wrote and gave to EMS when she called and brought to ED. The piece of lined note paper is dated \"10/5/22\" (ED presentation 11/5/22), and in handwriting starts out with the statement: \"Someone has stolen my identity.\" The note continues with concerns about the patient's internet being compromised by unknown forces, and needing the assistance of law enforcement. The brief statements are not " "fully coherently written, are illogical, and are appear paranoid. In asking patient further about this, the patient elaborated: \"I cannot trust the phones. There will be trickery again and giving wrong information.\"     Asked patient about recent or current SI or suicide plan and she said \"I plead the fifth\" to each. With regard to intent, patient stated: \"I would never kill myself because I have a kid.\" With regard to A/V hallucinations, patient responded \"I plead the fifth,\" including about chart documentation that she called 911 because she \"couldn't take the voices anymore.\" Patient denied any concerns about PHILLIPS, though Utox positive for guido+meth in ED.     Asked patient if she has any outpatient providers, she said not currently, though she thinks her brother may have tried to take her to a clinic, she can't remember which one or where, maybe it was \"[facility]?\" She did not recognize the name [facility] when this writer asked to clarify. Patient reported that she does not have a , and that she has not followed up about her online application for [employer] and other [facility] benefits because she believes her internet was compromised, she does not trust phones, and \"I tried several times to go to the office and I couldn't find it.\" She reported that she has not tried applying for social security yet because \"I don't trust phones\" and she doesn't know how to get to an office. She stated that she may be able to go to any office if her brother were to take her, but she is still worried about how to get medical records to social security without them being tampered with.     Patient passively acknowledged that she had taken Lithium doses in ED so far, and stated that she she doesn't usually take prescribed Lithium because \"I don't think it's real, you know, Lithium... batteries.\"     In response to question about what patient would like help with at this, patient responded that she wants " "help with \"starting fresh\" and with \"getting on disability.\" The patient stated that she wants to go home tonight and for her brother to pick her up to stay with her brother and his wife at their home. Patient gave updated phone number for her brother, Santo [phone], and permission for this writer to call and speak with him. This writer left a voicemail for her brother today for collateral information, no return call received.   Asked patient if she understands current plan for inpatient hospitalization. Patient reportedly irritably that she does not know about this plan (patient may potentially not remember, as with other details from the weekend) and does not want to go to inpatient unit. Asked patient if she would follow recommendations voluntarily. Patient stated: \"I am definitely not voluntary.\"  Asked patient if she understands that the ED physician placed her on a 72HH that is currently in effect. Patient stated that she did not know about her 72HH (patient may potentially not remember) and \"I'm leaving tonight no matter what.\"     Stated that this writer would attempt to call patient's brother for more information, including to ask if/when she might be welcome to return there. Stated that this writer would follow up with ED physician regarding patient's concerns. Concluded interview. Patient apologized for being irritable as this writer left the room.      Mental Status Exam:   Appearance: disheveled   Attitude: evasive and less cooperative  Eye Contact: variable  Mood: angry  Affect: mood congruent  Speech: prosody WDL  Psychomotor Behavior: fidgeting  Thought Process:  illogical  Thought Content: patient appears to be responding to internal stimuli and prominent paranoia. Refuses to answer about SI or plan.   Insight: limited  Judgement: poor  Oriented to: person, \"hospital,\" [place]; doesn't remember having been to this hospital before though here in IP in May, incorrect about month and " day  Attention Span and Concentration: limited  Recent and Remote Memory: poor historian at this time    ESS-6  1.a. Over the past 2 weeks, have you had thoughts of killing yourself? Yes- per DEC assessment    1.b. Have you ever attempted to kill yourself and, if yes, when did this last happen? No - per DEC assessment   2. Recent or current suicide plan? No per DEC assessment , though patient refused to answer today   3. Recent or current intent to act on ideation? No   4. Lifetime psychiatric hospitalization? Yes  5. Pattern of excessive substance use? Yes  6. Current irritability, agitation, or aggression? Yes  Scoring note: BOTH 1a and 1b must be yes for it to score 1 point, if both are not yes it is zero. All others are 1 point per number. If all questions 1a/1b - 6 are no, risk is negligible. If one of 1a/1b is yes, then risk is mild. If either question 2 or 3, but not both, is yes, then risk is automatically moderate regardless of total score. If both 2 and 3 are yes, risk is automatically high regardless of total score.      Score: 4, moderate risk    Diagnosis:   295.90  (F20.9) Schizophrenia - by history   Bipolar disorder, current episode mixed; severe with psychotic features - (F31.64)    Therapeutic Intervention(s):   Provided active listening, unconditional positive regard, and validation. Engaged in guided discovery, explored patient's perspectives and helped expand them through socratic dialogue.    Treatment Objective(s) Addressed:   The focus of this session was on assessing safety and identifying treatment goals.     Case Management:   Left  for patient's brother, Santo [phone], at 5:20PM, requesting call back. Updated brother's phone number with registration. Updated FARIDEH Bowie.     General Recommendations:   Continue to monitor for harm. Consider: Complete environmental rounding at least 1x/ shift: check for and remove objects which could be use for self/other directed violence, Use  clear and concise directions, too many words can be overwhelming, Provide the pt with options to provide a sense of control. Try to tell the pt what they can do instead of what they can't do, Listen in a neutral, non-judgmental way. Offer reassurance and Other: Consider 2:1 Staffing if deliverying any unwelcome news to patient.     Plan:   Continue to recommend Adult IP  admission for patient safety and stabilization due to SI, paranoia, and disorganized thinking. Psychiatry consult ordered with request for MICD commitment Examiner's Statement if appropriate.     Plan for Care reviewed with Assigned Medical Provider? Yes. Provider, Guilherme Wallace MD, response: concurs with plan.      KAYCE POLO M.Ed., [facility], [facility]  Licensed Mental Health Professional  Triage and Transition Services - [facility] Care [phone]    [facility] workstation [phone]  [facility] iPhone [phone]

## 2022-11-08 NOTE — ED NOTES
Pt saw extended care virtually and became more upset. Extended care reached out to writer and reports that they will be adding some medications and starting the process for a commitment.

## 2022-11-08 NOTE — TELEPHONE ENCOUNTER
R:72  11/7 @ 0127, [facility] Bed Call Log 11.01.22 @ 7:30am                   [facility] is posting 0 beds.                 [facility] is posting 0 beds.               [facility] is posting 0 beds.               [facility] is posting 0 beds.               [facility] is posting 0 beds.               [facility] is posting 0 beds.                    [facility] is posting 0 beds.               [facility] is posting 0 beds.               [facility]  is posting 0 beds.              [facility] is posting 0 beds. Mixed unit 12+. Low acuity only.                [facility] is positing 0 beds. No aggression.                [facility] is posting 0 beds.                [facility] is posting 0 beds. Low acuity only.               [facility] is posting 0 beds.               [facility] is posting 2 beds. Low acuity.              [facility] is posting 0 beds.              [facility] is posting 1 bed. 72 hr hold required.                [facility] is posting 2 beds.                [facility] is posting 0 beds. Vol only, No Hx of aggression, violence or assault. No sexual offenders. No 72 hr holds.               [facility] is posting 5 beds. (Must have the cognitive ability to do programming. No aggressive or violent behavior or recent HX in the last 2 yrs. MH must be primary.)               [facility] is posting 0 beds. Low acuity only. Violence and aggression capped.                [facility] is posting 0 beds. Low acuity, Neg Covid.                [facility] is posting 0 beds. Covid neg. Vol only. Combined adolescent and adult unit. No aggressive or violent behavior. No registered sex offenders.                [facility] is posting 6 beds. Call for details.               [facility] is posting 1 bed. Low acuity only.     No appropriate beds.     9:47a Intake called [place]  "(Emani) to inquire about beds. Only 1 bed available on a low-acuity voluntary unit, pt is not appropriate.     12:37p Intake received notification that pt's Commitment petition was received in [facility] at 1202p on 11/8/2022 and was placed in \"Hold Paperwork/Commitment\" folder.   "

## 2022-11-08 NOTE — TELEPHONE ENCOUNTER
5:04 PM Intake presented patient to station 12, patient does not meet criteria for station 12 and has been declined.

## 2022-11-08 NOTE — ED NOTES
Civil Commitment Status    Current commitment status is: under 72 Hour Hold expiring 11/10/22 at 12:01 AM    County involved: [facility]    Petition for civil commitment sent to [facility] and verbal report given to [facility] commitment intake team at [facility] ([phone]).    Next steps include PPS will be assigned to case and interview pt.

## 2022-11-08 NOTE — PROGRESS NOTES
Patient expressed that she is leaving because she came in to the ED voluntarily. RN informed patient that she had been placed on a 72 hour hold on 11/6. Pt became very agitated by this, hitting her mattress and cursing in her room. RN gave patient 72 hour hand out and provided therapeutic communication to allow patient to express frustration. Pt offered PRN medication for agitation by RN which patient accepted.

## 2022-11-08 NOTE — ED PROVIDER NOTES
[facility] ED Mental Health Handoff Note:       Brief HPI:  This is a 52 year old female signed out to me by Dr. Davenport.  See initial ED Provider note for full details of the presentation.     Home meds reviewed and ordered/administered: Yes    Medically stable for inpatient mental health admission: Yes.    Evaluated by mental health: Yes. The recommendation is for inpatient mental health treatment. Bed search in process    Safety concerns: At the time I received sign out, there were no safety concerns.    Hold Status:  Active Orders   Legal    Emergency Hospitalization Hold (72 Hr Hold)     Frequency: Effective Now     Start Date/Time: 11/06/22 0127      Number of Occurrences: Until Specified    Health Officer Authority to Detain (HEATHER)     Frequency: Effective Now     Start Date/Time: 11/05/22 1456      Number of Occurrences: Until Specified     Order Comments: Pt reports auditory and visual hallucinations. Pt reports suicidal thoughts but denies any current plan or intent.         Exam:   Patient Vitals for the past 24 hrs:   BP Pulse Resp SpO2   11/08/22 0617 136/70 69 12 99 %     No acute distress    ED Course:    Medications   OLANZapine (zyPREXA) tablet 10 mg (10 mg Oral Given 11/7/22 2059)   hydrOXYzine (ATARAX) tablet 50 mg (has no administration in time range)   lithium capsule 300 mg (300 mg Oral Given 11/7/22 2100)   nicotine Patch in Place ( Transdermal Patch Free Period 11/8/22 0431)   nicotine (NICODERM CQ) 21 MG/24HR 24 hr patch 1 patch (1 patch Transdermal Not Given 11/7/22 1620)   OLANZapine zydis (zyPREXA) ODT tab 10 mg (10 mg Oral Given 11/5/22 1625)       ED Course as of 11/08/22 1707   Tue Nov 08, 2022   0802 Psych ([facility]): Paitent is not safe to go home. They will file petition. Haldol PRN pending to transfer.       There were no significant events during my shift.    Patient was signed out to the oncoming provider, Dr. Oliva      Impression:    ICD-10-CM    1. Psychosis, unspecified  psychosis type (H)  F29       2. Methamphetamine abuse (H)  F15.10           Plan:    1. Awaiting inpatient mental health admission/transfer.      RESULTS:   No results found for this visit on 11/05/22 (from the past 24 hour(s)).          Mitchell Freitas, Abel Mujica DO  11/08/22 1708

## 2022-11-08 NOTE — CONSULTS
"  Christina Vásquez MRN# [id number]   Age: 52 year old YOB: 1970   Date of Admission to ED: 11/5/2022         Video Visit Details:     Type of Service:  Telemedicine Visit: The patient's condition can be safely assessed and treated via synchronous audio and visual telemedicine encounter.       Reason for Telemedicine Visit: due to Distance location      Originating Site (Patient Location): Emergency Department [facility]     Distant Site (Provider Location): [facility] emergency room  Consent:    The patient/guardian has been notified of the following:    This telemedicine visit is conducted live between you and your clinician. We have found that certain health care needs can be provided without the need for a physical exam. This service lets us provide the care you need with a telemedicine conversation.      The patient/guardian has verbally consented to: the potential risks and benefits of telemedicine (video visit) versus in person care; bill my insurance or make self-payment for services provided; and responsibility for payment of non-covered services.      Mode of Communication:  Video Conference via picsell     As the provider I attest to compliance with applicable laws and regulations related to telemedicine.     Video Start Time (time video started): 0713    Video End Time (time video stopped): 0721        Reason for Consult:   Patient is on 72-hour hold assess patient for civil commitment with chemical dependency, and medication adjustment.      This writer met patient in her room through [employer], patient was very irritable, labile mood pleasure speech and angry during this encounter.  Patient was saying \" I called the police to stop with the voices, voices was bothering me, they brought to me to emergency room, I do not need any help from you.\"  Patient was saying the emergency room staffs tricked her to stay in the " "hospital.  Patient was saying .\" would like to get out of here.\"  When the writer told her she is under 72-hour hold, it will be in her best interest to  come inpatient voluntarily, patient became agitated aggressive pushing the iPad and  started screaming and yelling and  left the video conference.  Due to poor impulse control, auditory hallucination, paranoia and substance use disorder patient needs inpatient mental health unit for her safety since patient refused voluntary inpatient unit we will file civil commitment with chemical dependency.      I have reviewed the nursing notes. I have reviewed the findings, diagnosis, plan and need for follow up with the patient.           HPI:   Christina Vásquez is a 52 year old female with history of bipolar affective disorder, psychosis, amphetamine abuse, psychosis, anxiety, depression, among others, who presents with a mental health problem that has been ongoing for an uncertain period of time      Pt has not required locked seclusion or restraints in the past 24 hours to maintain safety, please refer to RN documentation for further details.  Substance use does appear to be playing a contributing role in the patient's presentation.  Brief Therapeutic Intervention(s):   Provided active listening, unconditional positive regard, and validation. Engaged in cognitive restructuring/ reframing, looked at common cognitive distortions and challenged negative thoughts. Engaged in guided discovery, explored patient's perspectives and helped expand them through socratic dialogue. Provided positive reinforcement for progress towards goals, gains in knowledge, and application of skills previously taught.  Engaged in social skills training. Explored and identified early warning signs to anger.        Past Psychiatric History:             Substance Use and History:             Past Medical History:   PAST MEDICAL HISTORY: No past medical history on file.    PAST SURGICAL HISTORY: No past " surgical history on file.            Allergies:     Allergies   Allergen Reactions     Penicillins Itching             Medications:     I have reviewed this patient's current medications  Current Facility-Administered Medications   Medication     haloperidol (HALDOL) tablet 5 mg     haloperidol lactate (HALDOL) injection 5 mg     hydrOXYzine (ATARAX) tablet 50 mg     lithium capsule 300 mg     nicotine (NICODERM CQ) 21 MG/24HR 24 hr patch 1 patch     nicotine Patch in Place     OLANZapine (zyPREXA) tablet 10 mg     Current Outpatient Medications   Medication Sig     lithium 300 MG capsule Take 300 mg by mouth 2 times daily              Family History:   FAMILY HISTORY: No family history on file.        Social History:   SOCIAL HISTORY:   Social History     Tobacco Use     Smoking status: Not on file     Smokeless tobacco: Not on file   Substance Use Topics     Alcohol use: Not on file            PTA Medications:   (Not in a hospital admission)         Allergies:     Allergies   Allergen Reactions     Penicillins Itching          Labs:   No results found for this or any previous visit (from the past 48 hour(s)).       Physical and Psychiatric Examination:     /70   Pulse 69   Temp 97.4  F (36.3  C) (Temporal)   Resp 12   SpO2 99%   Weight is 0 lbs 0 oz  There is no height or weight on file to calculate BMI.    Mental Status Exam:  Appearance: dressed in hospital scrubs  Attitude:  more cooperative  Eye Contact:  intense  Mood:  angry  Affect:  labile  Speech:  pressured speech  Language: fluent and intact in English  Psychomotor, Gait, Musculoskeletal:  no evidence of tardive dyskinesia, dystonia, or tics  Thought Process:  disorganized and illogical  Associations:  no loose associations  Thought Content:  auditory hallucinations present  Insight:  limited  Judgement:  limited  Oriented to:  time, person, and place  Attention Span and Concentration:  limited  Recent and Remote Memory:  poor  Fund of  Knowledge: Unable to assess         Diagnoses:      Psychosis, unspecified psychosis type (H)  Methamphetamine abuse (H)         Recommendations:    Pt displays the following risk factors that support IP admission: SI,  unable to contract for safety. Pt is unable to engage in safety planning to mitigate risk level in a non-secure setting. Lower  -behaviors paranoid, auditory hallucination, and substance use disorder- which contributed to safety risk  - Continue to recommend inpatient psychiatric hospitalizations for further stabilization   2.  Continue her current medications   Current Facility-Administered Medications   Medication     haloperidol (HALDOL) tablet 5 mg     haloperidol lactate (HALDOL) injection 5 mg     hydrOXYzine (ATARAX) tablet 50 mg     lithium capsule 300 mg     nicotine (NICODERM CQ) 21 MG/24HR 24 hr patch 1 patch     nicotine Patch in Place     OLANZapine (zyPREXA) tablet 10 mg     Current Outpatient Medications   Medication     lithium 300 MG capsule     3.  Continue 72-hour hold, will file petition for civil commitment with M/ICD  treatment per ED team  -  - Consulted with [facility],patient nurse , ED physicians regarding this case.    Please call [facility]/DEC at [phone] if you have follow-up questions or wish to place another consult.  Ki Gonzalez Psychiatric Nurse practitioner    Attestation:  Time with:  Patient: 8  minutes  Treatment Team: 30 Minutes  Chart Review: 20  minutes    Total time spent was 58 minutes. Over 50% of times was spent counseling and coordination of care.    I, Ki Gonzalez, CNP, APRN, Psychiatric Nurse Practitioner have personally performed an examination of this patient.  I have edited the note to reflect all relevant changes.  I have discussed this patient with the care team November 8, 2022 I have reviewed all vitals and laboratory findings.    Disclaimer: This note consists of symbols derived from keyboarding,

## 2022-11-08 NOTE — TELEPHONE ENCOUNTER
0036 Bed Search Update:    [facility]-No beds available  [facility] beds available  [facility] beds [facility]  [facility]-No beds available  [facility]-No beds available  [facility]-No beds available  [facility]-No beds available  [facility]-No beds available  [facility]-No beds available  [facility]-No beds available.  Low acuity only.  Mixed unit adol/adult/eduard  [facility]-No beds available  [facility]-No beds available  [facility]-No beds available  [facility]-No beds available  [facility]-No beds available  [facility]-Declined on 11/7.  May reassess on 11/8.  [facility]-No beds available.  Must be on a 72 HH. No intake after 10 PM.  [facility]-No beds available  [facility]-Voluntary/[place] only. No hx violence or sexual assault.  [facility]-No beds available  [facility]-No beds available  [facility]-Meets exclusionary criteria.  No recent hx violence/aggression. Must be able to program in groups.  [facility]-Low acuity only.  [facility]-No beds available.  No recent violence or aggression.  [facility]-No beds available  [facility]-No beds available  [facility]-Unable to accept high acuity at this time.    Remains on wait list.

## 2022-11-08 NOTE — ED NOTES
Triage & Transition Services, Extended Care     Christina Vásquez  November 8, 2022    Christina is followed related to 72 Hour Hold: expires 11/10/22 at 12:01 AM. Please see initial DEC Crisis Assessment completed for complete assessment information. Medical record is reviewed. While patient is in the ED, care team is working towards Learn and Demonstrate at Least One Skill Focused on Crisis Stabilization. Additional notes include SI, PHILLIPS, psychosis.    This writer attempted to meet with Pt. Per RN: would not be beneficial at this time due to agitation. Was giving medication to calm down.    There are not significant status changes.     Plan:  Pt is still recommended inpatient mental health at this time. Pt lacks insight into her mental health and CD. MICD will be pursue as pt does not want to be voluntary for inpatient mental health.    Plan for Care reviewed with Assigned Medical Provider? No. Care plan remains the same.    Extended Care will follow and meet with patient/family/care team as able or requested.     Ally Way, [facility], Extended Care   [phone]

## 2022-11-09 ENCOUNTER — TELEPHONE (OUTPATIENT)
Dept: BEHAVIORAL HEALTH | Facility: CLINIC | Age: 52
End: 2022-11-09

## 2022-11-09 PROCEDURE — 250N000013 HC RX MED GY IP 250 OP 250 PS 637: Performed by: EMERGENCY MEDICINE

## 2022-11-09 RX ADMIN — LITHIUM CARBONATE 300 MG: 300 CAPSULE, GELATIN COATED ORAL at 11:37

## 2022-11-09 RX ADMIN — NICOTINE 1 PATCH: 21 PATCH, EXTENDED RELEASE TRANSDERMAL at 11:36

## 2022-11-09 RX ADMIN — OLANZAPINE 10 MG: 10 TABLET, FILM COATED ORAL at 20:24

## 2022-11-09 RX ADMIN — LITHIUM CARBONATE 300 MG: 300 CAPSULE, GELATIN COATED ORAL at 20:24

## 2022-11-09 RX ADMIN — OLANZAPINE 10 MG: 10 TABLET, FILM COATED ORAL at 11:37

## 2022-11-09 ASSESSMENT — ACTIVITIES OF DAILY LIVING (ADL)
ADLS_ACUITY_SCORE: 35

## 2022-11-09 NOTE — ED NOTES
ED MH signout note     Prolonged ED boarding.  Has had psych consult - virtual last 11.8    Bipolar, polysubst abuse, anxiety, MDD.  Petition for committment made    Rec continues to be inpt.      meds ordered, prns ordered.  Diet ordered.      ICD-10-CM    1. Psychosis, unspecified psychosis type (H)  F29       2. Methamphetamine abuse (H)  F15.10           MD Luis Charlton Jerome Richard, MD  11/10/22 0033

## 2022-11-09 NOTE — TELEPHONE ENCOUNTER
11:06a Intake notes that pt's Notice for Pre-Petition Screening was received in Right[employer]ax at 10:58a Intake placed paperwork in [facility] Paperwork folder in [employer]ax.

## 2022-11-09 NOTE — ED NOTES
Patient agitated, stating that she has to leave by 1330 today. Patient advised that she is on a hold and that we cannot let her leave at this time, pt asked this RN if she needs to call her . Patient advised that the hold is legal but she has a phone in her room if she would like to call anyone

## 2022-11-09 NOTE — TELEPHONE ENCOUNTER
R:  11/9/22 10:30am  Pt in [facility] ED.  72 HH expires 11/10/22 12:01AM.  Petition for commit filed 11/8/22.  Waiting for PPS to interview pt.Bed search completed:        [facility] have no appropriate/available beds.    [facility] is posting 0 beds.       [facility] is posting 0 beds.     [facility] is posting 0 beds.     [facility] is posting 0 beds.     [facility] is posting 0 beds.     [facility] is posting 0 beds.       [facility] is posting 0 beds.     [facility] is posting 0 beds.     [facility]  is posting 0 beds.    [facility] is posting 0 beds. Mixed unit 12+. Low acuity only.      [facility] is positing 0 beds. No aggression.      [facility] is posting 0 beds.      [facility] is posting 0 beds. Low acuity only.     [facility] is posting 0 beds.     [facility] is posting 0 beds. Low acuity.     [facility] is posting 0 beds.    [facility] is posting 1 bed. 72 hr hold required.  Pt's acuity level not appropriate for unit.    [facility] is posting 1 bed.  Pt's acuity level not appropriate for unit.    [facility] is posting 0 beds. Vol only, No Hx of aggression, violence or assault. No sexual offenders. No 72 hr holds.     [facility] is posting 5 beds. Pt not appropriate d/t hx of aggression.. No aggressive or violent behavior or recent HX in the last 2 yrs. MH must be primary.)     [facility] is posting 0 beds. Low acuity only. Violence and aggression capped.      [facility] is posting 0 beds. Low acuity, Neg Covid.      [facility] is posting 0 beds. Covid neg. Vol only. Combined adolescent and adult unit. No aggressive or violent behavior. No registered sex offenders.      [facility] is posting 6 beds. Call for details. Not appropriate d/t legal status.    [facility] is posting 1 bed. Low acuity only. Pt is not low acuity.  Pt will remain on work  list.

## 2022-11-09 NOTE — TELEPHONE ENCOUNTER
R: Pt in [facility] ER awaiting placement. Per chart, in [place] due to 72HH    8pm bed search    [facility]: No beds available  [facility] : No beds available  [facility]: No beds available  [facility]: No beds available  [facility]: No beds available  regions: No beds available  [facility]: No beds available  [place]: No beds available  [facility]: Posting 2 beds; mixed unit low acuity only  [place]: No beds available  [facility]: No beds available  [facility]: No beds available  [facility]: No beds available  [facility]:  Posting 1 bed; low acuity only  [facility]: Posting 1 bed; low acuity only  [facility]: Posting 1 bed; low acuity only  [facility]: : No beds available  [facility]: Posting 5 beds; low acuity only  [facility]: : No beds available  [facility]: : No beds available  [facility]: : No beds available  [facility]: Posting 1 bed; low acuity only.     Pt placed on work list until appropriate placement is available

## 2022-11-09 NOTE — ED NOTES
Triage & Transition Services, Extended Care     Christina Vásquez  November 9, 2022    Christina is followed related to 72 Hour Hold: [facility] has issued notice of prepetition screening to pt.. Please see initial DEC Crisis Assessment completed for complete assessment information. Medical record is reviewed. Pt continues to quite agitated, wants to leave the hospital.    While patient is in the ED, care team is working towards Demonstrate Calm, Non Violent/Destructive Behavior for at least 24 hours.     There are not significant status changes.     [facility] has issued notice of prepetition screening.    Plan:  Pt continues to be appropriate for inpt admission.  [facility] pre petition screening  is involved.       Extended Care will follow and meet with patient/family/care team as able or requested.     Roseline Beard, [facility], Extended Care   [phone]

## 2022-11-09 NOTE — TELEPHONE ENCOUNTER
0203 Bed Search Update:    [facility]-No beds available  [facility]-No beds available  [facility]-No beds available  [facility]-No beds available  [facility]-No beds available  [facility]-No beds available  [facility]-No beds available  [facility]-No beds available  [facility]-No beds available  [facility]-Low acuity only.  Mixed unit adol/adult/eduard  [facility]-No beds available  [facility]-No beds available  [facility]-No beds available  [place]-No beds available  [facility]-Meets exclusionary criteria.  [facility] [facility]-Declined on 11/7.  0206 [facility] [place] reported all sites are at capacity tonight.  [facility]-No beds available.  Must be on a 72 HH. No intake after 10 PM.  [facility]-No beds available  [facility]-Voluntary/[place] only. No hx violence or sexual assault.  [facility]-No beds available  [facility]-No beds available  [facility]-Meets exclusionary criteria.  No recent hx violence/aggression. Must be able to program in groups.  [facility]-Low acuity only.  [facility]-No beds available.  No recent violence or aggression.  [facility]-No beds available  [facility]-No beds available  [facility]-Unable to accept high acuity at this time.     Remains on wait list.

## 2022-11-10 ENCOUNTER — HOSPITAL ENCOUNTER (INPATIENT)
Facility: CLINIC | Age: 52
LOS: 11 days | Discharge: HOME | End: 2022-11-21
Attending: PSYCHIATRY & NEUROLOGY | Admitting: PSYCHIATRY & NEUROLOGY
Payer: COMMERCIAL

## 2022-11-10 ENCOUNTER — TELEPHONE (OUTPATIENT)
Dept: BEHAVIORAL HEALTH | Facility: CLINIC | Age: 52
End: 2022-11-10

## 2022-11-10 VITALS
SYSTOLIC BLOOD PRESSURE: 139 MMHG | DIASTOLIC BLOOD PRESSURE: 88 MMHG | RESPIRATION RATE: 18 BRPM | TEMPERATURE: 97.6 F | HEART RATE: 81 BPM | OXYGEN SATURATION: 99 %

## 2022-11-10 DIAGNOSIS — F41.9 ANXIETY: ICD-10-CM

## 2022-11-10 DIAGNOSIS — F31.2 BIPOLAR AFFECTIVE DISORDER, CURRENTLY MANIC, SEVERE, WITH PSYCHOTIC FEATURES (H): Primary | ICD-10-CM

## 2022-11-10 DIAGNOSIS — F51.05 INSOMNIA DUE TO OTHER MENTAL DISORDER: ICD-10-CM

## 2022-11-10 DIAGNOSIS — F99 INSOMNIA DUE TO OTHER MENTAL DISORDER: ICD-10-CM

## 2022-11-10 PROCEDURE — 250N000013 HC RX MED GY IP 250 OP 250 PS 637: Performed by: EMERGENCY MEDICINE

## 2022-11-10 PROCEDURE — 250N000013 HC RX MED GY IP 250 OP 250 PS 637

## 2022-11-10 PROCEDURE — 124N000002 HC R&B MH UMMC

## 2022-11-10 RX ORDER — HYDROXYZINE HYDROCHLORIDE 25 MG/1
25 TABLET, FILM COATED ORAL EVERY 4 HOURS PRN
Status: DISCONTINUED | OUTPATIENT
Start: 2022-11-10 | End: 2022-11-14

## 2022-11-10 RX ORDER — MAGNESIUM HYDROXIDE/ALUMINUM HYDROXICE/SIMETHICONE 120; 1200; 1200 MG/30ML; MG/30ML; MG/30ML
30 SUSPENSION ORAL EVERY 4 HOURS PRN
Status: DISCONTINUED | OUTPATIENT
Start: 2022-11-10 | End: 2022-11-21 | Stop reason: HOSPADM

## 2022-11-10 RX ORDER — OLANZAPINE 10 MG/1
10 TABLET ORAL 3 TIMES DAILY PRN
Status: DISCONTINUED | OUTPATIENT
Start: 2022-11-10 | End: 2022-11-21 | Stop reason: HOSPADM

## 2022-11-10 RX ORDER — ACETAMINOPHEN 325 MG/1
650 TABLET ORAL EVERY 4 HOURS PRN
Status: DISCONTINUED | OUTPATIENT
Start: 2022-11-10 | End: 2022-11-21 | Stop reason: HOSPADM

## 2022-11-10 RX ORDER — AMOXICILLIN 250 MG
1 CAPSULE ORAL 2 TIMES DAILY PRN
Status: DISCONTINUED | OUTPATIENT
Start: 2022-11-10 | End: 2022-11-21 | Stop reason: HOSPADM

## 2022-11-10 RX ORDER — OLANZAPINE 10 MG/2ML
10 INJECTION, POWDER, FOR SOLUTION INTRAMUSCULAR 3 TIMES DAILY PRN
Status: DISCONTINUED | OUTPATIENT
Start: 2022-11-10 | End: 2022-11-21 | Stop reason: HOSPADM

## 2022-11-10 RX ORDER — TRAZODONE HYDROCHLORIDE 50 MG/1
50 TABLET, FILM COATED ORAL
Status: DISCONTINUED | OUTPATIENT
Start: 2022-11-10 | End: 2022-11-21 | Stop reason: HOSPADM

## 2022-11-10 RX ADMIN — HYDROXYZINE HYDROCHLORIDE 50 MG: 25 TABLET, FILM COATED ORAL at 14:58

## 2022-11-10 RX ADMIN — OLANZAPINE 10 MG: 10 TABLET, FILM COATED ORAL at 10:48

## 2022-11-10 RX ADMIN — LITHIUM CARBONATE 300 MG: 300 CAPSULE, GELATIN COATED ORAL at 10:48

## 2022-11-10 RX ADMIN — HALOPERIDOL 5 MG: 5 TABLET ORAL at 13:42

## 2022-11-10 RX ADMIN — NICOTINE 1 PATCH: 21 PATCH, EXTENDED RELEASE TRANSDERMAL at 10:48

## 2022-11-10 ASSESSMENT — ACTIVITIES OF DAILY LIVING (ADL)
ADLS_ACUITY_SCORE: 35
DOING_ERRANDS_INDEPENDENTLY_DIFFICULTY: NO
ORAL_HYGIENE: INDEPENDENT
ADLS_ACUITY_SCORE: 28
WALKING_OR_CLIMBING_STAIRS_DIFFICULTY: NO
CONCENTRATING,_REMEMBERING_OR_MAKING_DECISIONS_DIFFICULTY: NO
DIFFICULTY_EATING/SWALLOWING: NO
ADLS_ACUITY_SCORE: 35
DRESS: INDEPENDENT
ADLS_ACUITY_SCORE: 28
TOILETING_ISSUES: NO
WEAR_GLASSES_OR_BLIND: NO
ADLS_ACUITY_SCORE: 35
FALL_HISTORY_WITHIN_LAST_SIX_MONTHS: NO
CHANGE_IN_FUNCTIONAL_STATUS_SINCE_ONSET_OF_CURRENT_ILLNESS/INJURY: NO
ADLS_ACUITY_SCORE: 35
DRESSING/BATHING_DIFFICULTY: NO
ADLS_ACUITY_SCORE: 35
HYGIENE/GROOMING: INDEPENDENT
ADLS_ACUITY_SCORE: 35

## 2022-11-10 NOTE — ED NOTES
Pt was served her commitment papers. She was agitated thinking she would be leaving today. She tried to walk out an hour ago thinking her 72 hour hold had ended. RN and security stopped her. She was mad about the news at first, but then she said just medicate me so I can sleep until Monday. RN assured her she can speak to the psycologist once she gets to [place]. She denies suicidal ideation.

## 2022-11-10 NOTE — ED NOTES
Pt woke up today and stated she can go home today because her 72 hour hold was up. She wants to go to her brothers. She also stated that she never told the EMS driver that she wanted to hurt herself. RN stated she would discuss her placement situation with her when RN had more information.

## 2022-11-10 NOTE — TELEPHONE ENCOUNTER
R: Pt currently awaiting a bed in [place] ED. Per chart, will go statewide      6:30pm Bed Search:      [place]: No beds available  [facility] : No beds available  [facility]: No beds available  [facility]: No beds available  [facility]: No beds available  regions: No beds available  [facility]: No beds available  [place]: No beds available  [facility]: Posting 2 beds; mixed unit low acuity only - Pt not appropriate for Unit Milieu   [facility]: 2 beds available - Per call with damien Long [facility] beds available system wide for 11/9, check back tomorrow.   [facility]: No beds available  [facility]: No beds available  [place]: No beds available  [facility]:  No beds available   [facility]: No beds available   [facility]: Posting 2 beds; low acuity only - per call with Sharri,Pt not appropriate for open beds   [facility]: No beds available  [facility]: Posting 5 beds; low acuity only - Per call with Per, Pt is too acute for current Milieu   [facility]: No beds available  [facility]: No beds available  [facility]: No beds available  [facility]: Posting 1 bed; per website, unable to accept high acuity at this time      Pt placed on work list until appropriate placement is available

## 2022-11-10 NOTE — ED PROVIDER NOTES
[facility] ED Mental Health Handoff Note:       Brief HPI:  This is a 52 year old female signed out to me by Dr. So.  See initial ED Provider note for full details of the presentation.     Home meds reviewed and ordered/administered: Yes    Medically stable for inpatient mental health admission: Yes.    Evaluated by mental health: Yes. The recommendation is for inpatient mental health treatment. Bed search in process    Safety concerns: At the time I received sign out, there were no safety concerns.    Hold Status:  Active Orders   Legal    Emergency Hospitalization Hold (72 Hr Hold)     Frequency: Effective Now     Start Date/Time: 11/06/22 0127      Number of Occurrences: Until Specified    Emergency Hospitalization Hold (72 Hr Hold)     Frequency: Effective Now     Start Date/Time: 11/10/22 0533      Number of Occurrences: Until Specified    Health Officer Authority to Detain (HEATHER)     Frequency: Effective Now     Start Date/Time: 11/05/22 1456      Number of Occurrences: Until Specified     Order Comments: Pt reports auditory and visual hallucinations. Pt reports suicidal thoughts but denies any current plan or intent.       Exam:   Patient Vitals for the past 24 hrs:   BP Pulse SpO2   11/09/22 1138 114/60 88 99 %     No acute distress.    ED Course:    Medications   OLANZapine (zyPREXA) tablet 10 mg (10 mg Oral Given 11/9/22 2024)   hydrOXYzine (ATARAX) tablet 50 mg (has no administration in time range)   lithium capsule 300 mg (300 mg Oral Given 11/9/22 2024)   nicotine Patch in Place ( Transdermal Patch in Place 11/10/22 0536)   nicotine (NICODERM CQ) 21 MG/24HR 24 hr patch 1 patch (1 patch Transdermal Patch/Med Applied 11/9/22 1136)   haloperidol lactate (HALDOL) injection 5 mg (has no administration in time range)   haloperidol (HALDOL) tablet 5 mg (5 mg Oral Given 11/8/22 0850)   OLANZapine zydis (zyPREXA) ODT tab 10 mg (10 mg Oral Given 11/5/22 1625)       ED Course as of 11/10/22 0743   Tue  Nov 08, 2022   0802 Psych ([facility]): [place] is not safe to go home. They will file petition. Wilfredodol PRN pending to transfer.       There were no significant events during my shift.    Patient was signed out to the oncoming provider, Dr. Oliva      Impression:    ICD-10-CM    1. Psychosis, unspecified psychosis type (H)  F29       2. Methamphetamine abuse (H)  F15.10           Plan:    1. Awaiting inpatient mental health admission/transfer.      RESULTS:   No results found for this visit on 11/05/22 (from the past 24 hour(s)).          DO Zena PASCUAL Ferris, DO  11/10/22 2620

## 2022-11-10 NOTE — ED NOTES
Intake called to report that pt has a bed at [facility] 10 at 3pm. RN there will call here for report when they are ready.

## 2022-11-10 NOTE — TELEPHONE ENCOUNTER
9:30 AM Intake called Dr. Sanchez for private bed on station 10. Dr. Sanchez requesting decision on petition for commitment prior to patient admitting to station 10.     9:41 AM Intake called and spoke to [facility] ext care Shanell that Karie is on the case today and arrives to work at 10am. Karie will work on reaching out to the court for any update on time of pre-petition screening (likely to happen today) and when the court has reached a decision. Karie to fax any documentation to intake when available and update intake with more information.     10:58 AM Intake received call from Karie with [facility] ext care that [facility] has put patient on a [address] court hold, documentation received 11/10/2022 at 10:38am and moved to hold paperwork/commitment folder in right fax. [facility] faxed paperwork to unit 10 for review.    11:08 AM Intake called Dr. Sanchez that patient has been placed on a court hold. Laura accepts patient for station 10. Intake placed patient in queue and completed indicia.     11:23 AM Intake called Charge RN on station 10 Yobani, discharge for room will not occur until evening shift, Station 10 will reach out to [facility] for report.     11:25 AM Intake called [facility] ED and spoke to FARIDEH Rushing with update on patient disposition.

## 2022-11-10 NOTE — ED NOTES
Triage & Transition Services, Extended Care     Christina Vásquez  November 10, 2022    Christina is followed related to Long wait time for admission: 120+ hours. Please see initial DEC Crisis Assessment for complete assessment information. Medical record is reviewed. While patient is in the ED, care team is working towards Learn and Demonstrate at Least One New Coping Strategy Related to psychosis.     Additional notes include: Coordinated by phone and email with [facility] PPS [place], [place], P [phone], C [phone], F [phone], [email or web address].   Treatment recommendations form signed by Ki Gonzalez, CLAUDE, CNP, and emailed back to PPS, receipt confirmed.     Coordinated by phone and email with Sunshine Ventura, [facility] Court Operations Leadworker/[place] Probate Registrar, P [phone], [email or web address].   PPS Report received, faxed to STAT for scanning. Return of Service forms faxed to Probate Court per instructions after patient served. Details for [place] court date for Monday received via email and communicated to [facility]-Station 10/Dr. Sanchez by Extended Care coordinators.     This writer served patient with Notice of Hearing and Order for Hearing and Order for Apprehension, Confinement, and Notice of Hearing, with FARIDEH Rushing, assisting in de-escalation of patient's mild emotional escalation. Patient yelled, pulled and hit mattress, crumpled up documents and threw them toward trash. Security standing by outside room did not need to intervene during this time.     There are significant status changes: Patient is now on a court hold per [facility] Probate Court.     Recommend patient's care team to continue care coordination with [facility] PPS [place], P [phone], C [phone], F [phone], [email or web address].     Plan:  Dr. Sanchez accepted patient for admission to [facility]-Station 10. RN called report this afternoon and transport is ordered.  Transfer pending at time this note is filed.     Plan for Care reviewed with Assigned Medical Provider? Yes. Provider, Selvin Oliva MD, response: concurs with plan    Extended Care will follow and meet with patient/family/care team as able or requested.     KAYCE POLO M.Ed., [facility], [facility]  Licensed Mental Health Professional  Triage and Transition Services - Extended Care [phone]    [facility] workstation [phone]  [facility] iPhone [phone]

## 2022-11-10 NOTE — TELEPHONE ENCOUNTER
"11:30a Intake notes receiving pt's Order for Apprehension, Confinement, and Notice of Hearing in RightFax at 10:41:28a on 11/10/2022. Intake placed paperwork in \"Hold Paperwork/Commitment\" Folder.   "

## 2022-11-11 ENCOUNTER — TELEPHONE (OUTPATIENT)
Dept: BEHAVIORAL HEALTH | Facility: CLINIC | Age: 52
End: 2022-11-11

## 2022-11-11 PROBLEM — F06.30 MOOD DISORDER DUE TO OLD HEAD INJURY: Status: ACTIVE | Noted: 2022-11-11

## 2022-11-11 PROBLEM — F15.951 AMPHETAMINE-INDUCED PSYCHOTIC DISORDER WITH HALLUCINATIONS (H): Status: ACTIVE | Noted: 2022-11-11

## 2022-11-11 PROBLEM — F14.10 COCAINE ABUSE (H): Status: ACTIVE | Noted: 2022-11-11

## 2022-11-11 PROBLEM — S09.90XS MOOD DISORDER DUE TO OLD HEAD INJURY: Status: ACTIVE | Noted: 2022-11-11

## 2022-11-11 PROCEDURE — 124N000002 HC R&B MH UMMC

## 2022-11-11 PROCEDURE — 99222 1ST HOSP IP/OBS MODERATE 55: CPT | Mod: AI | Performed by: PSYCHIATRY & NEUROLOGY

## 2022-11-11 PROCEDURE — G0177 OPPS/PHP; TRAIN & EDUC SERV: HCPCS

## 2022-11-11 PROCEDURE — 250N000013 HC RX MED GY IP 250 OP 250 PS 637: Performed by: PSYCHIATRY & NEUROLOGY

## 2022-11-11 RX ORDER — LITHIUM CARBONATE 300 MG/1
300 CAPSULE ORAL 2 TIMES DAILY
Status: DISCONTINUED | OUTPATIENT
Start: 2022-11-11 | End: 2022-11-11

## 2022-11-11 RX ORDER — DIVALPROEX SODIUM 250 MG/1
250 TABLET, DELAYED RELEASE ORAL EVERY MORNING
Status: DISCONTINUED | OUTPATIENT
Start: 2022-11-12 | End: 2022-11-14

## 2022-11-11 RX ORDER — DIVALPROEX SODIUM 500 MG/1
500 TABLET, DELAYED RELEASE ORAL AT BEDTIME
Status: DISCONTINUED | OUTPATIENT
Start: 2022-11-11 | End: 2022-11-14

## 2022-11-11 RX ADMIN — LITHIUM CARBONATE 300 MG: 300 CAPSULE, GELATIN COATED ORAL at 13:09

## 2022-11-11 RX ADMIN — DIVALPROEX SODIUM 500 MG: 500 TABLET, DELAYED RELEASE ORAL at 21:25

## 2022-11-11 RX ADMIN — HYDROXYZINE HYDROCHLORIDE 25 MG: 25 TABLET, FILM COATED ORAL at 15:57

## 2022-11-11 ASSESSMENT — ACTIVITIES OF DAILY LIVING (ADL)
ORAL_HYGIENE: INDEPENDENT
ADLS_ACUITY_SCORE: 28
DRESS: SCRUBS (BEHAVIORAL HEALTH);INDEPENDENT
ADLS_ACUITY_SCORE: 28
DRESS: INDEPENDENT
ADLS_ACUITY_SCORE: 28
ORAL_HYGIENE: INDEPENDENT
ADLS_ACUITY_SCORE: 28
LAUNDRY: WITH SUPERVISION
ADLS_ACUITY_SCORE: 28
HYGIENE/GROOMING: INDEPENDENT
HYGIENE/GROOMING: INDEPENDENT
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
## 2022-11-11 NOTE — PLAN OF CARE
"  Problem: Suicidal Behavior  Goal: Suicidal Behavior is Absent or Managed  Outcome: Not Progressing   Goal Outcome Evaluation:    Plan of Care Reviewed With: patient      Christina is a 52 year old/Female presenting with SI and AH. Per chart review, Pt arrived via EMS. Pt presents with substance induced psychosis, SI, and AH. Patient endorses AH as bothersome non-stop noises. Pt find it difficult to function. Patient endorses SI and not wanting to live because of voices. Patient has delusional thought.   Pt affect in ED: disorganized, irritable, not angry, flat  Pt Dx: Schizoaffective bipolar type and methamphetamine abuse.    Upon admission to the unit, pt denied every single statement mentioned above. Pt stated \" I reported my identity being stolen. They said I'm suicidal, which is not true. I'm upset that I'm here. I take lithium regularly and it's working very well for me.\" Pt went on stating \" I don't want to kill myself. I have too much to live for.\" Pt denied auditory hallucination as well as SI/HI. Pt endorsed anxiety rated 7 on a 10 scale, denied depressive symptoms. Pt expressed being frustrated in the hospital but she was calm and polite.   Plan: Continue to provide safe environment and therapeutic milieu.                      "

## 2022-11-11 NOTE — DISCHARGE INSTRUCTIONS
Behavioral Discharge Planning and Instructions    Summary: You were admitted to [facility] on 11/10/2022 due to mood lability, agitation, paranoia, amphetamine and cocaine abuse. You were treated by Shaina Sanchez MD and provisionally discharged on 11/21/2022 to home with family.    You were dually committed to the [facility] and the [facility] on 11/17/2022.  You were also court ordered to take the medications the doctor ordered for you. You are being discharged on a Provisional Discharge Agreement which shall remain in effect for the duration of the Commitment.  Your Commitment expires on 5/17/2023.      Continue to follow with your assigned mental health : [facility] (phone: [phone]) through [facility].     You were referred to chemical dependency intensive outpatient (IOP) treatment. 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  Amphetamine abuse   Amphetamine induced  psychosis with hallucinations   Nicotine use disorder    Cocaine abuse  Alcohol abuse    Health Care Follow-up:   Appointment Date/Time: Tuesday, December 6 at 12:30pm in-person   Psychiatrist/Medication Management: Thais Willams PA-C, MS     Address: [facility] [address] [place] [address]   Phone Number: [phone]    Fax: [phone]    Appointment Date/Time: Wednesday, December 7 at 10am by phone  Therapist: Luca Briones LP  Address: [facility] [address] [place] [address]   Phone Number: [phone]    Fax: [phone]  Note: You will receive a call from [facility]. Contact [facility] to  "confirm your phone number on file.     Appointment Date/Time: Tuesday, November 29 at 10am in-person  Case Management Intake: [facility]  Address: [address], [address], [place] [address]   Phone Number: [phone]    Attend all scheduled appointments with your outpatient providers. Call at least 24 hours in advance if you need to reschedule an appointment to ensure continued access to your outpatient providers.     Major Treatments, Procedures and Findings:  You were provided with: a psychiatric assessment, assessed for medical stability, medication evaluation and/or management, group therapy, CD evaluation/assessment, milieu management, and medical interventions    Symptoms to Report: Feeling more aggressive, increased confusion, losing more sleep, mood getting worse, or thoughts of suicide.    Early warning signs can include: Increased depression or anxiety sleep disturbances increased thoughts or behaviors of suicide or self-harm  increased unusual thinking, such as paranoia or hearing voices.    Safety and Wellness: Take all medicines as directed. Make no changes unless your doctor suggests them. Follow treatment recommendations. Refrain from alcohol and non-prescribed drugs. Ask your support system to help you reduce your access to items that could harm yourself or others. If there is a concern for safety, call 101.    Resources:   General Mental Health Resources:   National Center on Mental Illness ([place]) [place]: Connect for help, to navigate the mental health system, and for support and for resources. Call: 9-412-ADNY-Helps / 9-823-472-0797  Crisis Text Line: The 24/7 emergency service is available if you or someone you know is experiencing a psychiatric or mental health crisis. Text: \"[place]\" to 507461  [place] Warmline: Are you an adult needing support? Talk to a specialist who has firsthand experience living with a mental health condition. Call: 527.507.6969  " "Text: \"Support\" to [address]82  [facility].org/support/[place]-warmline/  National Suicide Prevention Lifeline: The 24/7 lifeline provides support when in distress, has prevention and crisis resources for you or your loved ones, and resources for professionals. Call 9-990-024-WLRF (6043)  Peer Support Connection Warmlines: Qgqi-qt-zuii telephone support that s safe and supportive. Open 5 p.m. to 9 a.m. Call or text: 1-435.836.8994   COVID Cares Stress Phone Support Service. Any [place] experiencing stress can call 534-TWMB7EO (145-415-1617) for free telephone support from 9am to 9pm every day. The service is a collaboration with volunteers from the [place] Psychiatric Society, the [place] Psychological Association, the [place] [employer], and [facility]. The free service is also accessible at [employer] where searchers can also find psychiatric and mental health services availability and real-time Substance Use Disorder Treatment program openings.  Adult Rehabilitative Mental Health Services (ARMHS): https://[place].gov/dhs/partners-and-providers/policies-procedures/adult-mental-health/adult-rehabilitative-mental-health-services/armhs-certified-providers/  [facility] Crisis Response 178-509-1368  [facility] Crisis (COPE) Response - Adult [phone]  [facility] Crisis Response - Adult 723 421-7881    Outpatient Psychiatry/Therapy Resources:   [facility] - (phone: 164.497.4483) https://www.[employer] [facility]/care/specialty/mental-behavioral-health/  https://www.[employer] [facility]/care/find/doctors/psychologists/  [facility] - (phone: 3-290-YLFDAVTA) https://www.[employer].org/treatments/Counseling-Adult  [place] [facility] - (phone: 121.919.3421) https://bettermarks.[employer]/  [facility] - (phone: 619.939.7545)  https://[facility] Digital TechnologiesmnLawKick/  [facility] - " (phone: 1-785.169.8169) https://www.[employer]aves/  [facility] - (phone: 321.199.3393) https://www.[employer]aves/  [facility] - (phone: 521.838.4169) https://[employer]aves/  [facility] - (phone: 176.879.4342) https://walkin.org/    General Medication Instructions:   See your medication sheet(s) for instructions.   Take all medicines as directed.  Make no changes unless your doctor suggests them.   Go to all your doctor visits.  Be sure to have all your required lab tests. This way, your medicines can be refilled on time.  Do not use any drugs not prescribed by your doctor.  Avoid alcohol.    Advance Directives:   Scanned document on file with [facility]? No scanned doc  Is document scanned? No. Copy Requested.  Honoring Choices Your Rights Handout: Informed and given  Was more information offered? Pt declined    The Treatment team has appreciated the opportunity to work with you. If you have any questions or concerns about your recent admission, you can contact the unit which can receive your call 24 hours a day, 7 days a week. They will be able to get in touch with a Provider if needed. The unit number is [phone].
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
## 2022-11-11 NOTE — TELEPHONE ENCOUNTER
11:50 AM Intake received [facility] support of [facility] screening for commitment in right fax 11/11/2022 @1133am. Intake faxed document to unit 10 and moved fax to hold paperwork/commitment folder in right fax

## 2022-11-11 NOTE — PHARMACY-ADMISSION MEDICATION HISTORY
Please see Admission Medication History note completed on 11/6 under previous encounter for information regarding prior to admission medications.    Kami Horn, PharmD   *[phone]
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
## 2022-11-11 NOTE — ED NOTES
Patient cooperative, loaded up with EMS. Report given to EMS. Belongings given to EMS. Patient left with EMS without difficulty

## 2022-11-11 NOTE — PLAN OF CARE
Problem: Suicidal Behavior  Goal: Suicidal Behavior is Absent or Managed  Outcome: Progressing     Pt presented as alert and oriented to place and self throughout shift.  She was primarily isolative and withdrawn to her room during the day.  Pt was dressed appropriately, however appeared somewhat disheveled.  She didn't eat breakfast, but had lunch.  Pt was ordered medication in early afternoon and was compliant with this med.  She did not request or require any PRNs this shift.  Pt denied anxiety and depression, although she appears as such.  She also denied any symptoms of psychosis.  Pt did not endorse acute physical health concerns, pain or side effects to medications this shift.

## 2022-11-11 NOTE — PROGRESS NOTES
11/10/22 1951   Valuables   Patient Belongings locker   Patient Belongings Put in Hospital Secure Location (Security or Locker, etc.) clothing;shoes   Did you bring any home meds/supplements to the hospital?  No       LOCKER:    Pants, Sweatshirt, Shoes and Belt.       A               Admission:  I am responsible for any personal items that are not sent to the safe or pharmacy.  [facility] is not responsible for loss, theft or damage of any property in my possession.    Signature:  _________________________________ Date: _______  Time: _____                                              Staff Signature:  ____________________________ Date: ________  Time: _____      2nd Staff person, if patient is unable/unwilling to sign:    Signature: ________________________________ Date: ________  Time: _____     Discharge:  [facility] has returned all of my personal belongings:    Signature: _________________________________ Date: ________  Time: _____                                          Staff Signature:  ____________________________ Date: ________  Time: _____

## 2022-11-12 PROCEDURE — 250N000013 HC RX MED GY IP 250 OP 250 PS 637: Performed by: PSYCHIATRY & NEUROLOGY

## 2022-11-12 PROCEDURE — 124N000002 HC R&B MH UMMC

## 2022-11-12 PROCEDURE — 250N000013 HC RX MED GY IP 250 OP 250 PS 637: Performed by: CLINICAL NURSE SPECIALIST

## 2022-11-12 PROCEDURE — H2032 ACTIVITY THERAPY, PER 15 MIN: HCPCS

## 2022-11-12 RX ORDER — NICOTINE 21 MG/24HR
1 PATCH, TRANSDERMAL 24 HOURS TRANSDERMAL DAILY
Status: DISCONTINUED | OUTPATIENT
Start: 2022-11-12 | End: 2022-11-21 | Stop reason: HOSPADM

## 2022-11-12 RX ADMIN — NICOTINE POLACRILEX 4 MG: 2 GUM, CHEWING BUCCAL at 17:21

## 2022-11-12 RX ADMIN — NICOTINE 1 PATCH: 14 PATCH, EXTENDED RELEASE TRANSDERMAL at 17:21

## 2022-11-12 RX ADMIN — DIVALPROEX SODIUM 250 MG: 250 TABLET, DELAYED RELEASE ORAL at 09:20

## 2022-11-12 RX ADMIN — HYDROXYZINE HYDROCHLORIDE 25 MG: 25 TABLET, FILM COATED ORAL at 09:23

## 2022-11-12 RX ADMIN — TRAZODONE HYDROCHLORIDE 50 MG: 50 TABLET ORAL at 19:15

## 2022-11-12 RX ADMIN — DIVALPROEX SODIUM 500 MG: 500 TABLET, DELAYED RELEASE ORAL at 19:45

## 2022-11-12 RX ADMIN — HYDROXYZINE HYDROCHLORIDE 25 MG: 25 TABLET, FILM COATED ORAL at 16:14

## 2022-11-12 ASSESSMENT — ACTIVITIES OF DAILY LIVING (ADL)
HYGIENE/GROOMING: INDEPENDENT
ADLS_ACUITY_SCORE: 28
DRESS: SCRUBS (BEHAVIORAL HEALTH);INDEPENDENT
ADLS_ACUITY_SCORE: 28
ORAL_HYGIENE: INDEPENDENT
ADLS_ACUITY_SCORE: 28
ORAL_HYGIENE: INDEPENDENT
ADLS_ACUITY_SCORE: 28
LAUNDRY: WITH SUPERVISION
ADLS_ACUITY_SCORE: 28
HYGIENE/GROOMING: INDEPENDENT
ADLS_ACUITY_SCORE: 28
DRESS: INDEPENDENT

## 2022-11-12 NOTE — PLAN OF CARE
Problem: Depressive Symptoms  Goal: Depressive Symptoms  Description: Signs and symptoms of listed problems will be absent or manageable.  Outcome: Progressing     Pt presented as alert and oriented to place and self throughout shift.  She was primarily isolative and withdrawn to her room during the day.  Pt was dressed appropriately, however appeared somewhat disheveled.  She ate meals and was compliant with her medication.  She did not request or require any PRNs this shift.  Pt endorsed anxiety 5/10 and very mild depression.  She denied any symptoms of psychosis.  Pt did not endorse acute physical health concerns, pain or side effects to medications this shift.

## 2022-11-12 NOTE — PLAN OF CARE
Problem: Depressive Symptoms  Goal: Depressive Symptoms  Description: Signs and symptoms of listed problems will be absent or manageable.  Outcome: Progressing    Plan of Care Reviewed With: patient        Patient was out in the lounge, watching TV. Isolative and withdrawn to self. Patient presents with tense and flat affect. Patient is alert and oriented X 4, was admitted yesterday for SI/AH. Patient reported feeling very frustrated that she is being held in the hospital against her wish. On assessment, patient  denied pain, SI/SIB/HI, A/V hallucination. Patient reported depression of 1/10 because she has to stay in the hospital, anxiety of 8/10. Requested and received prn Hydroxyzine. Patient reported very moderate relief. Patient is currently on court hold. Hearing is scheduled on Monday. Patient stated her appetite is adequate. No reported or observed concerns. Patient answers questions appropriately. Mood is calm, concentration is adequate, with good eye contact. Lithium was discontinued this shift. Patient was started on Depakote and was medication compliant. Staff will continue to offer therapeutic milieu and provide support as needed.

## 2022-11-13 PROCEDURE — 250N000013 HC RX MED GY IP 250 OP 250 PS 637: Performed by: CLINICAL NURSE SPECIALIST

## 2022-11-13 PROCEDURE — 124N000002 HC R&B MH UMMC

## 2022-11-13 PROCEDURE — 250N000013 HC RX MED GY IP 250 OP 250 PS 637: Performed by: PSYCHIATRY & NEUROLOGY

## 2022-11-13 RX ADMIN — HYDROXYZINE HYDROCHLORIDE 25 MG: 25 TABLET, FILM COATED ORAL at 07:52

## 2022-11-13 RX ADMIN — DIVALPROEX SODIUM 500 MG: 500 TABLET, DELAYED RELEASE ORAL at 18:30

## 2022-11-13 RX ADMIN — HYDROXYZINE HYDROCHLORIDE 25 MG: 25 TABLET, FILM COATED ORAL at 22:04

## 2022-11-13 RX ADMIN — TRAZODONE HYDROCHLORIDE 50 MG: 50 TABLET ORAL at 20:47

## 2022-11-13 RX ADMIN — TRAZODONE HYDROCHLORIDE 50 MG: 50 TABLET ORAL at 18:30

## 2022-11-13 RX ADMIN — NICOTINE 1 PATCH: 14 PATCH, EXTENDED RELEASE TRANSDERMAL at 07:47

## 2022-11-13 RX ADMIN — DIVALPROEX SODIUM 250 MG: 250 TABLET, DELAYED RELEASE ORAL at 07:48

## 2022-11-13 RX ADMIN — HYDROXYZINE HYDROCHLORIDE 25 MG: 25 TABLET, FILM COATED ORAL at 18:30

## 2022-11-13 RX ADMIN — NICOTINE POLACRILEX 4 MG: 2 GUM, CHEWING BUCCAL at 13:47

## 2022-11-13 RX ADMIN — ACETAMINOPHEN 650 MG: 325 TABLET, FILM COATED ORAL at 07:48

## 2022-11-13 ASSESSMENT — ACTIVITIES OF DAILY LIVING (ADL)
ADLS_ACUITY_SCORE: 28
DRESS: INDEPENDENT
ORAL_HYGIENE: INDEPENDENT
ADLS_ACUITY_SCORE: 28
ADLS_ACUITY_SCORE: 28
HYGIENE/GROOMING: INDEPENDENT
ADLS_ACUITY_SCORE: 28
HYGIENE/GROOMING: INDEPENDENT
LAUNDRY: WITH SUPERVISION
DRESS: SCRUBS (BEHAVIORAL HEALTH);INDEPENDENT
ADLS_ACUITY_SCORE: 28
ORAL_HYGIENE: INDEPENDENT
ADLS_ACUITY_SCORE: 28
ADLS_ACUITY_SCORE: 28

## 2022-11-13 NOTE — PLAN OF CARE
Problem: Adult Behavioral Health Plan of Care  Goal: Optimized Coping Skills in Response to Life Stressors  Outcome: Progressing     Pt presented as alert and oriented to place and self throughout shift.  She was primarily isolative and withdrawn to her room during the day.  Pt was dressed appropriately and appeared adequately hygenic.  She ate meals and was compliant with her medication.  She requested PRN tylenol for 3/10 headache.  Pt endorsed anxiety 3/10, PRN hydroxyzine given, and denied depression.  She denied any symptoms of psychosis.  Pt did not endorse acute physical health concerns or side effects to medications this shift.

## 2022-11-13 NOTE — PLAN OF CARE
"Problem: Adult Behavioral Health Plan of Care  Goal: Plan of Care Review  Outcome: Progressing  Flowsheets  Taken 11/12/2022 2133  Plan of Care Reviewed With: patient  Patient Agreement with Plan of Care: agrees  Taken 11/12/2022 2000  Patient Agreement with Plan of Care: agrees     Problem: Depressive Symptoms  Goal: Depressive Symptoms  Description: Signs and symptoms of listed problems will be absent or manageable.  Outcome: Progressing  Flowsheets (Taken 11/12/2022 2133)  Depressive Symptoms Assessed:    mood    anxiety    speech    suicidality    insight    orientation    self injury    thought process    affect    psychomotor activity  Depressive Symptoms Present:    affect    anxiety   Goal Outcome Evaluation:    Plan of Care Reviewed With: patient Plan of Care Reviewed With: patient      Patient was AxOx4. Patient presented appropriately groomed and dressed in behavioral scrubs. Her speech was normal. She was calm and cooperative. She reported an improving mood, stating, \"I actually am feeling better.\" She expressed increased acceptance with her treatment plan and stated, \"I don't feel like I'm resisting anymore.\" However, patient does seem inappropriately focused on discharge and arranging for life after her hospital stay. Her affect was flat for most of the shift, but she became tearful when recounting all of her loss in the past year, particularly her mother's passing. She did endorse some anxiety, rating it a 2 out of 10, with 10 being the worst. She attributes her anxiety to feeling listless on the unit and says it improves when she can keep her mind busy with an activity. Writer offered PRN hydroxyzine for anxiety, which patient took. Patient had questions about commitment and the court process, so writer's preceptor provided education and assured patient that she can talk to her CTC on Monday who will have even more information. Patient denied SI, SIB, HI, AH, and VH. No evidence of delusions was " observed. Patient showered and did a puzzle during shift, spending most of the time in the milieu.

## 2022-11-13 NOTE — PROGRESS NOTES
11/12/22 1900   General Information   Art Directive other (see comments)   Task Orientation    Task orientation skills calm and focused;follows instruction   Social Interaction   Social interaction skills maintains boundaries   Art Therapy directive was a group carousel drawing directive in which pts contributed to each drawing/painting by moving around the room to each piece of art.  Goals of directive: emotional expression, emotional regulation, to assess how individual functions within a group dynamic, social interest.  Pt was an engaged participant, focused on task for the full duration of group. Pt contributed to each group drawing. Pt left before processing directive before group and did not return to group.  Pts mood was calm, quiet participant.

## 2022-11-13 NOTE — PLAN OF CARE
Pt presents with flat affect and is visible in the lounge. Pt is withdrawn to self for the most part, but is social at times. Pt reports feeling anxious and rates it 4/10 with 10 being the worst. Pt was given PRN hydroxyzine per request with some relief. Pt expressed concerns about trying to call her brother throughout the day and the phone just ringing endlessly. Staff were able to find pt's daughter's cell phone number in the chart and pt was able to get hold of her daughter. Pt appeared to be relieved. Pt made several other phone calls this shift. Pt denies SI/SIB/HI. Pt states she wants to go to bed early tonight due to anxiety. Pt took her HS medication including trazodone PRN early per her request. Pt was not able to fall asleep right away. Pt had a phone call with her ex- and pt stated this is the first productive phone call they have had in quite awhile and she said that he was being supportive to her. Pt stating she is struggling to sleep due to anxiety and racing thoughts about her court tomorrow. Pt was provided reassurance and reminded to speak with [facility] tomorrow morning. Pt was given weighted blanket, sound machine, second dose of trazodone and a second dose of hydroxyzine to help her calm and sleep.

## 2022-11-14 PROBLEM — F17.200 NICOTINE USE DISORDER: Status: ACTIVE | Noted: 2022-11-14

## 2022-11-14 PROCEDURE — 250N000013 HC RX MED GY IP 250 OP 250 PS 637: Performed by: CLINICAL NURSE SPECIALIST

## 2022-11-14 PROCEDURE — 99233 SBSQ HOSP IP/OBS HIGH 50: CPT | Performed by: PSYCHIATRY & NEUROLOGY

## 2022-11-14 PROCEDURE — 124N000002 HC R&B MH UMMC

## 2022-11-14 PROCEDURE — G0177 OPPS/PHP; TRAIN & EDUC SERV: HCPCS

## 2022-11-14 PROCEDURE — 250N000013 HC RX MED GY IP 250 OP 250 PS 637: Performed by: PSYCHIATRY & NEUROLOGY

## 2022-11-14 RX ORDER — HYDROXYZINE HYDROCHLORIDE 50 MG/1
50 TABLET, FILM COATED ORAL EVERY 4 HOURS PRN
Status: DISCONTINUED | OUTPATIENT
Start: 2022-11-14 | End: 2022-11-21 | Stop reason: HOSPADM

## 2022-11-14 RX ORDER — BUSPIRONE HYDROCHLORIDE 10 MG/1
10 TABLET ORAL 3 TIMES DAILY
Status: DISCONTINUED | OUTPATIENT
Start: 2022-11-14 | End: 2022-11-21 | Stop reason: HOSPADM

## 2022-11-14 RX ORDER — DIVALPROEX SODIUM 500 MG/1
500 TABLET, DELAYED RELEASE ORAL EVERY 12 HOURS SCHEDULED
Status: DISCONTINUED | OUTPATIENT
Start: 2022-11-15 | End: 2022-11-21 | Stop reason: HOSPADM

## 2022-11-14 RX ADMIN — NICOTINE POLACRILEX 4 MG: 2 GUM, CHEWING BUCCAL at 15:25

## 2022-11-14 RX ADMIN — NICOTINE POLACRILEX 4 MG: 2 GUM, CHEWING BUCCAL at 09:35

## 2022-11-14 RX ADMIN — HYDROXYZINE HYDROCHLORIDE 25 MG: 25 TABLET, FILM COATED ORAL at 12:47

## 2022-11-14 RX ADMIN — ACETAMINOPHEN 650 MG: 325 TABLET, FILM COATED ORAL at 08:26

## 2022-11-14 RX ADMIN — HYDROXYZINE HYDROCHLORIDE 25 MG: 25 TABLET, FILM COATED ORAL at 08:26

## 2022-11-14 RX ADMIN — NICOTINE POLACRILEX 4 MG: 2 GUM, CHEWING BUCCAL at 12:50

## 2022-11-14 RX ADMIN — TRAZODONE HYDROCHLORIDE 50 MG: 50 TABLET ORAL at 21:44

## 2022-11-14 RX ADMIN — DIVALPROEX SODIUM 500 MG: 500 TABLET, DELAYED RELEASE ORAL at 19:27

## 2022-11-14 RX ADMIN — NICOTINE POLACRILEX 4 MG: 2 GUM, CHEWING BUCCAL at 08:26

## 2022-11-14 RX ADMIN — TRAZODONE HYDROCHLORIDE 50 MG: 50 TABLET ORAL at 19:27

## 2022-11-14 RX ADMIN — NICOTINE 1 PATCH: 14 PATCH, EXTENDED RELEASE TRANSDERMAL at 09:36

## 2022-11-14 RX ADMIN — ACETAMINOPHEN 650 MG: 325 TABLET, FILM COATED ORAL at 18:00

## 2022-11-14 RX ADMIN — DIVALPROEX SODIUM 250 MG: 250 TABLET, DELAYED RELEASE ORAL at 08:26

## 2022-11-14 RX ADMIN — HYDROXYZINE HYDROCHLORIDE 25 MG: 25 TABLET, FILM COATED ORAL at 18:00

## 2022-11-14 ASSESSMENT — ACTIVITIES OF DAILY LIVING (ADL)
ADLS_ACUITY_SCORE: 28
ADLS_ACUITY_SCORE: 28
ORAL_HYGIENE: INDEPENDENT
DRESS: INDEPENDENT
ADLS_ACUITY_SCORE: 28
DRESS: SCRUBS (BEHAVIORAL HEALTH);INDEPENDENT
LAUNDRY: WITH SUPERVISION
ORAL_HYGIENE: INDEPENDENT
ADLS_ACUITY_SCORE: 28
HYGIENE/GROOMING: INDEPENDENT
ADLS_ACUITY_SCORE: 28
HYGIENE/GROOMING: INDEPENDENT

## 2022-11-14 NOTE — PLAN OF CARE
OCCUPATIONAL THERAPY GROUP NOTE:     11/14/22 1440   Group Therapy Session   Group Attendance attended group session   Time Session Began 1015   Time Session Ended 1100   Total Time (minutes) 45   Total # Attendees 4   Group Type task skill   Group Topic Covered structured socialization;relaxation techniques;problem-solving;coping skills/lifestyle management   Group Session Detail OT Clinic Group   Patient Response/Contribution cooperative with task;listened actively;organized;quietly engaged;flat affect   Patient Participation Detail Occupational therapy clinic. Purpose of structured group: exploration/development of positive coping skills, engagement in creative expression and clinical observation of social, cognitive, and kinesthetic performance skills. Pt response: Pt actively participated in occupational therapy clinic. Pt was able to ask for assistance as needed, and independently engage in a novel, goal-directed task with min assist for task setup. Pt demonstrated good focus and attention to detail during task completion. Pt appeared comfortable interacting with peers and writer as needed, however she generally kept to herself and appeared focused on her selected task. After being present in group for 45 minutes, she requested to take a break to walk the halls as she was feeling slightly restless.

## 2022-11-14 NOTE — PLAN OF CARE
NOC Shift Report     Pt in bed at beginning of shift, breathing quiet and unlabored. Pt slept through shift. Pt slept 7 hours.      No pt complaints or concerns at this time.      No PRNs given. Will continue to monitor.      Precautions: Suicide
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
## 2022-11-14 NOTE — PLAN OF CARE
OCCUPATIONAL THERAPY GROUP NOTE:     11/11/22 1523   Group Therapy Session   Group Attendance attended group session   Time Session Began 1315   Time Session Ended 1400   Total Time (minutes) 45   Total # Attendees 3   Group Type recreation   Group Topic Covered structured socialization;leisure exploration/use of leisure time;problem-solving   Group Session Detail OT Leisure Group   Patient Response/Contribution cooperative with task;listened actively;organized;pleasant;engaged;congruent affect   Patient Participation Detail Pt actively participated in a structured occupational therapy group with a focus on a visual-spatial leisure task. Pt was able to follow 2-step directions of the novel task with occasional cueing/assistance from writer, and demonstrated concrete planning and problem solving throughout the task. Pt remained focused and engaged for the full duration of group, and thanked writer prior to exiting group.
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
## 2022-11-14 NOTE — PLAN OF CARE
Problem: Adult Behavioral Health Plan of Care  Goal: Optimized Coping Skills in Response to Life Stressors  Outcome: Progressing     Pt presented as alert and oriented to place and self throughout shift.  She was primarily isolative and withdrawn to her room during the day.  Pt was dressed appropriately and appeared adequately hygenic.  Pt showered after breakfast.  She ate meals and was compliant with her medication.  She requested PRN tylenol for 4/10 headache.  Pt endorsed anxiety 4/10, PRN hydroxyzine given, and denied depression.  She denied any symptoms of psychosis.  Pt did not endorse acute physical health concerns or side effects to medications this shift.

## 2022-11-15 PROBLEM — F15.20 METHAMPHETAMINE USE DISORDER, SEVERE (H): Status: ACTIVE | Noted: 2022-11-15

## 2022-11-15 PROCEDURE — 124N000002 HC R&B MH UMMC

## 2022-11-15 PROCEDURE — 99232 SBSQ HOSP IP/OBS MODERATE 35: CPT | Performed by: PSYCHIATRY & NEUROLOGY

## 2022-11-15 PROCEDURE — 250N000013 HC RX MED GY IP 250 OP 250 PS 637: Performed by: PSYCHIATRY & NEUROLOGY

## 2022-11-15 PROCEDURE — H0001 ALCOHOL AND/OR DRUG ASSESS: HCPCS

## 2022-11-15 PROCEDURE — 250N000013 HC RX MED GY IP 250 OP 250 PS 637: Performed by: CLINICAL NURSE SPECIALIST

## 2022-11-15 RX ADMIN — NICOTINE POLACRILEX 2 MG: 2 GUM, CHEWING BUCCAL at 16:43

## 2022-11-15 RX ADMIN — NICOTINE POLACRILEX 2 MG: 2 GUM, CHEWING BUCCAL at 08:20

## 2022-11-15 RX ADMIN — DIVALPROEX SODIUM 500 MG: 500 TABLET, DELAYED RELEASE ORAL at 08:15

## 2022-11-15 RX ADMIN — HYDROXYZINE HYDROCHLORIDE 50 MG: 50 TABLET, FILM COATED ORAL at 13:12

## 2022-11-15 RX ADMIN — BUSPIRONE HYDROCHLORIDE 10 MG: 10 TABLET ORAL at 19:31

## 2022-11-15 RX ADMIN — HYDROXYZINE HYDROCHLORIDE 50 MG: 50 TABLET, FILM COATED ORAL at 19:32

## 2022-11-15 RX ADMIN — NICOTINE POLACRILEX 2 MG: 2 GUM, CHEWING BUCCAL at 10:04

## 2022-11-15 RX ADMIN — ACETAMINOPHEN 650 MG: 325 TABLET, FILM COATED ORAL at 10:13

## 2022-11-15 RX ADMIN — TRAZODONE HYDROCHLORIDE 50 MG: 50 TABLET ORAL at 19:32

## 2022-11-15 RX ADMIN — DIVALPROEX SODIUM 500 MG: 500 TABLET, DELAYED RELEASE ORAL at 19:31

## 2022-11-15 RX ADMIN — HYDROXYZINE HYDROCHLORIDE 50 MG: 50 TABLET, FILM COATED ORAL at 08:20

## 2022-11-15 RX ADMIN — NICOTINE 1 PATCH: 14 PATCH, EXTENDED RELEASE TRANSDERMAL at 08:16

## 2022-11-15 RX ADMIN — BUSPIRONE HYDROCHLORIDE 10 MG: 10 TABLET ORAL at 13:12

## 2022-11-15 RX ADMIN — BUSPIRONE HYDROCHLORIDE 10 MG: 10 TABLET ORAL at 08:15

## 2022-11-15 RX ADMIN — NICOTINE POLACRILEX 2 MG: 2 GUM, CHEWING BUCCAL at 15:09

## 2022-11-15 ASSESSMENT — ACTIVITIES OF DAILY LIVING (ADL)
DRESS: INDEPENDENT
ORAL_HYGIENE: INDEPENDENT
ADLS_ACUITY_SCORE: 28
HYGIENE/GROOMING: INDEPENDENT
HYGIENE/GROOMING: INDEPENDENT
ORAL_HYGIENE: INDEPENDENT
ADLS_ACUITY_SCORE: 28
DRESS: INDEPENDENT
ADLS_ACUITY_SCORE: 28
ADLS_ACUITY_SCORE: 28

## 2022-11-15 NOTE — PLAN OF CARE
NOC Shift Report     Pt in bed at beginning of shift, breathing quiet and unlabored. Pt slept through shift. Pt slept 6.75 hours.      No pt complaints or concerns at this time.      No PRNs given. Will continue to monitor.      Precautions: Suicide
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
## 2022-11-15 NOTE — PROGRESS NOTES
PSYCHIATRY PROGRESS NOTE         DATE OF SERVICE:   11/15/2022         CHIEF COMPLAINT:     Mood lability, response to medications, phone care conference with her daughter           OBJECTIVE:     Nursing reports : compliant with medications and groups      reports working on outpatient referrals, cooridnating commitment with court          SUBJECTIVE:      Christina takes medications. She says they are helping . She agrees with Rule 25, but she wants only OP CD tx and she says it would be helpful. She refuses IRTS placement. She says she wants to live with rosalina Garcia, her niece . We called her daughter for phone care conference to discus her condition . Her daughter worries about Christina. She says Christina misael not follow up with recomendations and she uses drugs.She says that Christina lives with her brother who is drug addict, but does not admit that. She says Christina sounds better in the hospital because she takes her medications. She says that her cousin  Radha is Christina's niece, and is reliable person who does not use drugs,but she does not know if she would let Christina stay with her. Christina says that she wants to have plays to stay and she wants to work. She says she has to make it on her own,. And her brother and her daughter could help in that process. We discuss commitment as legal procedure ordered by .Christina has difficulties understanding commitment, even though I explain it daily. I explained it to her daughter too. She denies suicidal and homicidal ideas, delusions and hallucinations presently. She slept better. She understands that her final hearing is on 11/17/22. She goes to groups. No altercations with staff and patients.          MEDICATIONS:       busPIRone  10 mg Oral TID     divalproex sodium delayed-release  500 mg Oral Q12H [facility] (08/20)     [START ON 11/16/2022] influenza recomb quadrivalent PF  0.5 mL Intramuscular Prior to discharge     nicotine  1 patch Transdermal Daily     nicotine    "Transdermal Q8H     acetaminophen, alum & mag hydroxide-simethicone, hydrOXYzine, nicotine, OLANZapine **OR** OLANZapine, senna-docusate, traZODone    Medication adherence: Yes  Medication side effects: No  Benefit: Symptom reduction         ROS:   As per history of present illness, otherwise reminder of review of systems is negative for: General, eyes, ears, nose, throat, neck, respiratory, cardiovascular, gastrointestinal, genitourinary, meniscal skeletal, neurological, hematological, dermatological and endocrine system.         MENTAL STATUS EXAM:   /72   Pulse 80   Temp 97.3  F (36.3  C) (Oral)   Resp 16   Ht 1.575 m (5' 2\")   Wt 64.9 kg (143 lb 1.6 oz)   SpO2 99%   BMI 26.17 kg/m      Appearance:fair hygiene  Orientation:x3  Speech:fluent  Language ability: intact  Thought process: concrete  Thought content: denie s delusions and hallucinations  Associations: Connected  Suicidal Ideation: denies   Homicidal Ideation: denies   Mood: labile  Affect: irritable    Intellectual functioning:average  Fund of Knowledge: consistent with medications and experience   Attention/Concentration: decreased  Memory: intact  Psychomotor Behavior: less agitated   Muscle Strength and Tone: no atrophy or involuntary movement  Gait and Station: steady  Insight and judgement:iinadequate          LABS:   personally reviewed.   Lab Results   Component Value Date     11/05/2022     05/23/2022     05/23/2022    CO2 26 11/05/2022    CO2 24 05/23/2022    CO2 25 05/23/2022    CO2 25 01/04/2022    BUN 18.4 11/05/2022    BUN 14 05/23/2022    BUN 14 05/23/2022    BUN 9 01/04/2022     No results found for: CKTOTAL, CKMB, TROPONINI  Lab Results   Component Value Date    WBC 8.9 11/05/2022    WBC 4.6 05/26/2022    WBC 5.0 05/25/2022    HGB 12.5 11/05/2022    HGB 13.1 05/26/2022    HGB 13.0 05/25/2022    HCT 38.0 11/05/2022    HCT 39.6 05/26/2022    HCT 39.6 05/25/2022    MCV 94 11/05/2022    MCV 94 05/26/2022    MCV " 95 05/25/2022     11/05/2022     05/26/2022     05/25/2022      [facility] Reference Range & Units 11/05/22 17:16 11/05/22 17:29   Sodium 136 - 145 mmol/L   139   Potassium 3.4 - 5.3 mmol/L   3.6   Chloride 98 - 107 mmol/L   104   Carbon Dioxide (CO2) 22 - 29 mmol/L   26   Urea Nitrogen 6.0 - 20.0 mg/dL   18.4   Creatinine 0.51 - 0.95 mg/dL   1.03 (H)   GFR Estimate >60 mL/min/1.73m2   66   Calcium 8.6 - 10.0 mg/dL   8.8   Anion Gap 7 - 15 mmol/L   9   Albumin 3.5 - 5.2 g/dL   3.6   Protein Total 6.4 - 8.3 g/dL   6.4   Alkaline Phosphatase 35 - 104 U/L   87   ALT 10 - 35 U/L   22   AST 10 - 35 U/L   24   Bilirubin Total <=1.2 mg/dL   0.4   Glucose 70 - 99 mg/dL   135 (H)   HCG Qual Urine Negative  Negative     WBC 4.0 - 11.0 10e3/uL   8.9   Hemoglobin 11.7 - 15.7 g/dL   12.5   Hematocrit 35.0 - 47.0 %   38.0   Platelet Count 150 - 450 10e3/uL   340   RBC Count 3.80 - 5.20 10e6/uL   4.04   MCV 78 - 100 fL   94   MCH 26.5 - 33.0 pg   30.9   MCHC 31.5 - 36.5 g/dL   32.9   RDW 10.0 - 15.0 %   12.1   % Neutrophils %   64   % Lymphocytes %   26   % Monocytes %   5   % Eosinophils %   4   % Basophils %   1   Absolute Basophils 0.0 - 0.2 10e3/uL   0.1   Absolute Eosinophils 0.0 - 0.7 10e3/uL   0.4   Absolute Immature Granulocytes <=0.4 10e3/uL   0.0   Absolute Lymphocytes 0.8 - 5.3 10e3/uL   2.3   Absolute Monocytes 0.0 - 1.3 10e3/uL   0.4   % Immature Granulocytes %   0   Absolute Neutrophils 1.6 - 8.3 10e3/uL   5.8   Absolute NRBCs 10e3/uL   0.0   NRBCs per 100 WBC <1 /100   0   SARS CoV2 PCR Negative  Negative     Lithium Level 0.6 - 1.2 mmol/L   <0.1 (L)   Salicylate mg/dL   1.3   Amphetamine Qual Urine Screen Negative  Screen Positive !     Benzodiazepine Urine Screen Negative  Screen Negative     Opiates Qualitative Urine Screen Negative  Screen Negative     Cannabinoids Qual Urine Screen Negative  Screen Negative     Barbiturates Qual Urine Screen Negative  Screen Negative     Cocaine Urine Screen  Negative  Screen Positive !                     Ventricular Rate BPM 67  84     Atrial Rate BPM 67  84     MT Interval ms 132  124     QRS Duration ms 88  80     QT ms 416  414     QTc ms 439  489     P Axis degrees 65  45     R AXIS degrees 59  43     T Axis degrees 77  94     Interpretation ECG   Sinus rhythm   Normal ECG   When compared with ECG of 04-JAN-2022 17:04,   T wave inversion less evident in Anterolateral leads   Confirmed by MD ROCHELLE, PATRICE ([id number]),  MELINDA POST ([id number]) on 5/27/2022 8:20:45 AM  Sinus rhythm   Nonspecific T wave abnormality   Prolonged QT   Abnormal ECG   When compared with ECG of 04-JAN-2022 16:15, (unconfirmed)   No significant change was found   Confirmed by - EMERGENCY ROOM, PHYSICIAN ([id number]),  NAT DELGADO ([id number]) on 1/5/2022 6:42:36 AM              DIAGNOSIS:     Bipolar disorder manic severe with psychotic features  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  Amphetamine abuse   Amphetamine induced  psychosis with hallucinations   Nicotine use disorder    Cocaine abuse  Alcohol abuse    Patient Active Problem List   Diagnosis     Psychosis (H)     Bipolar affective disorder, currently manic, severe, with psychotic features (H)     Amphetamine abuse (H)     Agitation     Psychosis, unspecified psychosis type (H)     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 She is going through commitment process. MICD tx requested. Preliminary hearing was  Yesterday and final hearing on 11/17/2022.She agrees with Rule 25 for OP tx .Will wait for [facility] neuroleptic order before ordering neuroleptics. These are [place]  recommendations:  Medications:   Buspar 10 mg tid for anxiety  Depakote DR  500 mg bid for  mood  stabilization   Hydroxyzine 50 mg q4 hours prn anxiety  Zyprexa 10 mg tid prn for agitation,psychosis  Nicotine patch 14 mg qday for nicotine use disorder  Nicotine gum 2 mg q1 hour prn nicotine use disorder   We discussed side effects, benefits and alternative treatment and patient agrees    Suicide precautions  Assault precautions   We discussed side effects, benefits and alternative treatments and patient agrees   Rule 25 for chemical dependency treatment   will collect collateral information and make outpatient referrals  Staff to provide emotional support and redirect as needed  Patient encouraged to attend groups  Lab results: Reviewed personally, check fasting lipids and glucose for neuroleptic baseline , check depakote level 11/21/bfore am dose   Consultation: According to patient symptom report    Risk Assessment: going through commitment process due to mood lability, psychosis,     Coordination of Care:   Patient seen, medical record reviewed, care coordinated with the team.    Total time:  More than 35 minutes spent on this visit with more than 50% time  spent on coordination of care with staff, phone care conference with patient and her daughter,reviewing medical record, psycho educations, providing supportive therapy regarding above issues,entering orders and preparing documentation for the visit.    This document is created with the help of Dragon dictation system.  All grammatical/typing errors or context distortion are unintentional and inherent to software.    Shaina Sanchez MD         Re-Certification I certify that the inpatient psychiatric facility services furnished since the previous certification were, and continue to be, medically necessary for, either, treatment which could reasonably be expected to improve the patient s condition or diagnostic study and that the hospital records indicate that the services furnished were, either, intensive treatment services, admission and  related services necessary for diagnostic study, or equivalent services.     I certify that the patient continues to need, on a daily basis, active treatment furnished directly by or requiring the supervision of inpatient psychiatric facility personnel.   I estimate TBD days of hospitalization is necessary for proper treatment of the patient. My plans for post-hospital care for this patient are : Medications, appointments     Shaina Sanchez MD

## 2022-11-15 NOTE — CONSULTS
11/15/2022    CD consult completed.  No referrals are being sent out at this time - pending outcome of court commitment.     Recommendations:   1. Abstain from all non-prescribed mood-altering substances  2. Take all medications as prescribed  3. Enter and complete a co-occurring IOP treatment program     * IOP programming is dependent on finding a safe, supportive living environment. If pt is unable to find housing (and refuses sober housing), patient is recommended a higher level of care.   4. Follow all recommendations upon discharge from treatment. Recommendations may include, but are not limited to: extended treatment, outpatient treatment and/or sober housing.  5. Follow all recommendations of your medical and mental health providers  6. Follow all conditions of courts/commitment     Clinical Substantiation:  Patient currently has unstable housing and lacks a sober living environment. Patient lacks sober coping skills, lacks awareness regarding the disease model of addiction and lacks a sober peer support network. Patient has dual issues of mental health and substance use, in which her mental health symptoms are exacerbated by her substance use.  Patient appears to lack insight into her addiction and how it has negatively impacted her mental health. Patient reports she is only interested in doing outpatient treatment at this time.  When asked about where she would live, pt provided three different options but nothing concrete (and reports she would leave the house if there was any substance use at her living situation). Patient reported she did not want sober housing. Patient will need a stable, supportive living environment in order to complete outpatient treatment. If she is unable to find housing (and does not want sober housing), pt is recommended residential co-occurring treatment. Pt is appropriate for IOP level of treatment as she has never had GERALDO services previously and starting with least restrictive  and meeting pt at their level is adequate. However, patient can be recommended a higher level of care if she is struggles in an outpatient setting.     Referrals/Alternatives:  TBD at this time     GERALDO consult completed by: Carolyn Snow [facility].  Phone Number: [phone]  E-mail Address: @[facility]  [address] [place] [address]     *Due to regulation of Title 42 of the Code of Federal Regulations (CFR) Part 2: Confidentiality laws apply to this note and the information wherein.  Thus, this note cannot be copy and pasted into any other health care staff's note nor can it be included in general medical records sent to ANY outside agency without the patient's written consent.

## 2022-11-15 NOTE — PLAN OF CARE
Pt has been in the milieu. Attended group. Pt wants to go to out patient cd treatment vs in patient. Pt plans to talk to her brother to see if she can live in her nieces basement. States it is a safe place due to no drugs or alcohol are allowed in her house. 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 Final hearing on Thursday.  Pt plans to return to work. Flat affect. Anxious mood. PRN visteral given with limited relief and states she just got down talking to  and court . Pt later requested second visteral with relief. PRN tylenol given per pts request for 4/10 pain with headache with 10 being high with some relief. Pt talked with CD  this shift. Pt ate breakfast and lunch. 6.75 hours sleep recorded last night. Pt has her mattress on the floor and states she sleeps on the floor at home as well due to the bed being so small.      Goal Outcome Evaluation: ongoing
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
## 2022-11-15 NOTE — PLAN OF CARE
"Problem: Depressive Symptoms  Goal: Depressive Symptoms  Description: Signs and symptoms of listed problems will be absent or manageable.  Outcome: Progressing     Problem: Psychotic Symptoms  Goal: Psychotic Symptoms  Description: Signs and symptoms of listed problems will be absent or manageable.  Outcome: Progressing   Goal Outcome Evaluation:    Plan of Care Reviewed With: patient        Patient was AxOx4. She showered and presented appropriately groomed and dressed. Patient denied feeling depressed and endorsed \"low anxiety,\" for which she took PRN hydroxyzine, which was effective per patient report. She denied SI, SIB, HI, AH/VH. No evidence of delusions was observed. She walked the halls with headphones on and was fairly withdrawn to self for most of the shift. She reported she noticed her mood dip after her court meeting. Patient noted right lower back pain, rating it a 5/10, with 10 being the worst. She was given PRN acetaminophen and hot packs and reported her pain reduced to 2/10. She also noted left arm soreness, stating it was maybe from holding the wall phone. Patient reports that she has been getting mild temporal headaches and believes they began when starting depakote. Writer referenced Micromedex which stated up to 31% of depakote users experience headaches. Writer notified patient and encouraged her to tell her psychiatric provider. Patient took PRN trazodone and tried to go to bed at 1945. She came back out of her room, made a few phone calls, and conversed with staff for 30 minutes. Her affect was flat, but brightened during conversation. She reported feeling her mood lift when her ex- talked about potentially getting back together on the phone. Patient's insight and judgement are improving. She stated, \"It sucks to be here, but I'm glad I'm here.\" Patient took a second PRN trazodone and went back to bed at 2225.            "

## 2022-11-16 LAB
CHOLEST SERPL-MCNC: 201 MG/DL
HBA1C MFR BLD: 5 % (ref 0–5.6)
HDLC SERPL-MCNC: 42 MG/DL
HOLD SPECIMEN: NORMAL
LDLC SERPL CALC-MCNC: 133 MG/DL
NONHDLC SERPL-MCNC: 159 MG/DL
TRIGL SERPL-MCNC: 129 MG/DL
VALPROATE SERPL-MCNC: 99 MG/L

## 2022-11-16 PROCEDURE — 99233 SBSQ HOSP IP/OBS HIGH 50: CPT | Performed by: PSYCHIATRY & NEUROLOGY

## 2022-11-16 PROCEDURE — 250N000013 HC RX MED GY IP 250 OP 250 PS 637: Performed by: PSYCHIATRY & NEUROLOGY

## 2022-11-16 PROCEDURE — 250N000013 HC RX MED GY IP 250 OP 250 PS 637: Performed by: CLINICAL NURSE SPECIALIST

## 2022-11-16 PROCEDURE — 124N000002 HC R&B MH UMMC

## 2022-11-16 PROCEDURE — 80164 ASSAY DIPROPYLACETIC ACD TOT: CPT | Performed by: PSYCHIATRY & NEUROLOGY

## 2022-11-16 PROCEDURE — 80061 LIPID PANEL: CPT | Performed by: PSYCHIATRY & NEUROLOGY

## 2022-11-16 PROCEDURE — 83036 HEMOGLOBIN GLYCOSYLATED A1C: CPT | Performed by: PSYCHIATRY & NEUROLOGY

## 2022-11-16 PROCEDURE — 36415 COLL VENOUS BLD VENIPUNCTURE: CPT | Performed by: PSYCHIATRY & NEUROLOGY

## 2022-11-16 RX ORDER — PALIPERIDONE 3 MG/1
3 TABLET, EXTENDED RELEASE ORAL AT BEDTIME
Status: DISCONTINUED | OUTPATIENT
Start: 2022-11-16 | End: 2022-11-21 | Stop reason: HOSPADM

## 2022-11-16 RX ADMIN — TRAZODONE HYDROCHLORIDE 50 MG: 50 TABLET ORAL at 21:13

## 2022-11-16 RX ADMIN — ACETAMINOPHEN 650 MG: 325 TABLET, FILM COATED ORAL at 09:27

## 2022-11-16 RX ADMIN — BUSPIRONE HYDROCHLORIDE 10 MG: 10 TABLET ORAL at 19:18

## 2022-11-16 RX ADMIN — PALIPERIDONE 3 MG: 3 TABLET, EXTENDED RELEASE ORAL at 21:13

## 2022-11-16 RX ADMIN — DIVALPROEX SODIUM 500 MG: 500 TABLET, DELAYED RELEASE ORAL at 19:18

## 2022-11-16 RX ADMIN — NICOTINE POLACRILEX 2 MG: 2 GUM, CHEWING BUCCAL at 11:17

## 2022-11-16 RX ADMIN — BUSPIRONE HYDROCHLORIDE 10 MG: 10 TABLET ORAL at 09:27

## 2022-11-16 RX ADMIN — NICOTINE POLACRILEX 2 MG: 2 GUM, CHEWING BUCCAL at 18:40

## 2022-11-16 RX ADMIN — ACETAMINOPHEN 650 MG: 325 TABLET, FILM COATED ORAL at 14:57

## 2022-11-16 RX ADMIN — NICOTINE POLACRILEX 2 MG: 2 GUM, CHEWING BUCCAL at 17:16

## 2022-11-16 RX ADMIN — BUSPIRONE HYDROCHLORIDE 10 MG: 10 TABLET ORAL at 14:55

## 2022-11-16 RX ADMIN — DIVALPROEX SODIUM 500 MG: 500 TABLET, DELAYED RELEASE ORAL at 09:27

## 2022-11-16 RX ADMIN — HYDROXYZINE HYDROCHLORIDE 50 MG: 50 TABLET, FILM COATED ORAL at 09:27

## 2022-11-16 RX ADMIN — NICOTINE 1 PATCH: 14 PATCH, EXTENDED RELEASE TRANSDERMAL at 11:17

## 2022-11-16 ASSESSMENT — ACTIVITIES OF DAILY LIVING (ADL)
ADLS_ACUITY_SCORE: 29
ADLS_ACUITY_SCORE: 28
ORAL_HYGIENE: INDEPENDENT
ADLS_ACUITY_SCORE: 28
ADLS_ACUITY_SCORE: 29
ADLS_ACUITY_SCORE: 28
ADLS_ACUITY_SCORE: 29
ORAL_HYGIENE: INDEPENDENT
HYGIENE/GROOMING: INDEPENDENT
ADLS_ACUITY_SCORE: 28
ADLS_ACUITY_SCORE: 28
HYGIENE/GROOMING: INDEPENDENT
ADLS_ACUITY_SCORE: 28
DRESS: SCRUBS (BEHAVIORAL HEALTH);INDEPENDENT
ADLS_ACUITY_SCORE: 28
ADLS_ACUITY_SCORE: 28
DRESS: SCRUBS (BEHAVIORAL HEALTH);INDEPENDENT
ADLS_ACUITY_SCORE: 28

## 2022-11-16 NOTE — PLAN OF CARE
Problem: Adult Behavioral Health Plan of Care  Goal: Optimized Coping Skills in Response to Life Stressors  Outcome: Progressing     Pt presented as alert and oriented to place and self throughout shift.  She was primarily isolative and withdrawn to her room during the day.  Pt was dressed appropriately and appeared adequately hygenic.  Pt showered after breakfast.  She ate meals and was compliant with her medication.  She requested PRN tylenol for headache and hydroxyzine for anxiety.  Pt denied depression.  She denied any symptoms of psychosis.  Pt did not endorse acute physical health concerns or side effects to medications this shift.
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
## 2022-11-16 NOTE — PLAN OF CARE
Patient appeared asleep for 6.75hrs during the shift. He did not have any behavioral or medical concerns and remain on 15min checks. RN will continue to monitor.

## 2022-11-16 NOTE — PLAN OF CARE
Problem: Depressive Symptoms  Goal: Depressive Symptoms  Description: Signs and symptoms of listed problems will be absent or manageable.  Outcome: Progressing   Goal Outcome Evaluation:    Plan of Care Reviewed With: patient    Patient was visible on the unit. Patient was observed listening to music over the headphones. Patient presents with a flat affect. Patient describes her mood s :slightly anxious'. Patient rates anxiety 3/10. Patient requested prn Hydroxyzine at hs. Patient reported some effectiveness.Patient denies SI, HI, depression, and A/V/H. Contracts for safety. Patient complained of lower back pain rated 2/10, patient utilized warm pack with some effectiveness. Patient was med compliant. Patient denies side effects. Vital signs were within normal limits.Patient ate 100% dinner and hs snack.

## 2022-11-17 PROCEDURE — 250N000013 HC RX MED GY IP 250 OP 250 PS 637: Performed by: CLINICAL NURSE SPECIALIST

## 2022-11-17 PROCEDURE — 99232 SBSQ HOSP IP/OBS MODERATE 35: CPT | Performed by: PSYCHIATRY & NEUROLOGY

## 2022-11-17 PROCEDURE — 124N000002 HC R&B MH UMMC

## 2022-11-17 PROCEDURE — 250N000013 HC RX MED GY IP 250 OP 250 PS 637: Performed by: PSYCHIATRY & NEUROLOGY

## 2022-11-17 RX ADMIN — NICOTINE 1 PATCH: 14 PATCH, EXTENDED RELEASE TRANSDERMAL at 07:56

## 2022-11-17 RX ADMIN — DIVALPROEX SODIUM 500 MG: 500 TABLET, DELAYED RELEASE ORAL at 19:18

## 2022-11-17 RX ADMIN — TRAZODONE HYDROCHLORIDE 50 MG: 50 TABLET ORAL at 19:20

## 2022-11-17 RX ADMIN — HYDROXYZINE HYDROCHLORIDE 50 MG: 50 TABLET, FILM COATED ORAL at 00:57

## 2022-11-17 RX ADMIN — TRAZODONE HYDROCHLORIDE 50 MG: 50 TABLET ORAL at 00:02

## 2022-11-17 RX ADMIN — BUSPIRONE HYDROCHLORIDE 10 MG: 10 TABLET ORAL at 19:18

## 2022-11-17 RX ADMIN — NICOTINE POLACRILEX 2 MG: 2 GUM, CHEWING BUCCAL at 19:20

## 2022-11-17 RX ADMIN — PALIPERIDONE 3 MG: 3 TABLET, EXTENDED RELEASE ORAL at 19:17

## 2022-11-17 RX ADMIN — ACETAMINOPHEN 650 MG: 325 TABLET, FILM COATED ORAL at 07:55

## 2022-11-17 RX ADMIN — BUSPIRONE HYDROCHLORIDE 10 MG: 10 TABLET ORAL at 07:55

## 2022-11-17 RX ADMIN — HYDROXYZINE HYDROCHLORIDE 50 MG: 50 TABLET, FILM COATED ORAL at 07:55

## 2022-11-17 RX ADMIN — NICOTINE POLACRILEX 2 MG: 2 GUM, CHEWING BUCCAL at 07:55

## 2022-11-17 RX ADMIN — BUSPIRONE HYDROCHLORIDE 10 MG: 10 TABLET ORAL at 14:01

## 2022-11-17 RX ADMIN — DIVALPROEX SODIUM 500 MG: 500 TABLET, DELAYED RELEASE ORAL at 07:55

## 2022-11-17 RX ADMIN — NICOTINE POLACRILEX 2 MG: 2 GUM, CHEWING BUCCAL at 16:02

## 2022-11-17 RX ADMIN — TRAZODONE HYDROCHLORIDE 50 MG: 50 TABLET ORAL at 19:21

## 2022-11-17 RX ADMIN — HYDROXYZINE HYDROCHLORIDE 50 MG: 50 TABLET, FILM COATED ORAL at 16:02

## 2022-11-17 ASSESSMENT — ACTIVITIES OF DAILY LIVING (ADL)
ADLS_ACUITY_SCORE: 29
ADLS_ACUITY_SCORE: 29
HYGIENE/GROOMING: INDEPENDENT
ADLS_ACUITY_SCORE: 29
HYGIENE/GROOMING: INDEPENDENT
ADLS_ACUITY_SCORE: 29
DRESS: INDEPENDENT
ADLS_ACUITY_SCORE: 29
ADLS_ACUITY_SCORE: 29
LAUNDRY: WITH SUPERVISION
ORAL_HYGIENE: INDEPENDENT
ADLS_ACUITY_SCORE: 29
ORAL_HYGIENE: INDEPENDENT
ADLS_ACUITY_SCORE: 29
DRESS: SCRUBS (BEHAVIORAL HEALTH);INDEPENDENT

## 2022-11-17 NOTE — PLAN OF CARE
Problem: Adult Behavioral Health Plan of Care  Goal: Adheres to Safety Considerations for Self and Others  Outcome: Progressing  Intervention: Develop and Maintain Individualized Safety Plan  Recent Flowsheet Documentation  Taken 11/16/2022 1712 by Danna Ojeda RN  Safety Measures:    safety rounds completed    suicide check-in completed  Goal: Absence of New-Onset Illness or Injury  Outcome: Progressing  Intervention: Identify and Manage Fall Risk  Recent Flowsheet Documentation  Taken 11/16/2022 1712 by Danna Ojeda RN  Safety Measures:    safety rounds completed    suicide check-in completed  Goal: Optimized Coping Skills in Response to Life Stressors  Outcome: Progressing   Goal Outcome Evaluation:    Plan of Care Reviewed With: patient        Pt is AxOx4, VSS, visible in the unit but with withdrawn to self, presents with a flat affect but bright upon approach. Pt denies all psych symptoms, including anxiety, depression, SI/HI/SIB/AVH, denies pain but reports not feeling her usual self. Insight is good, able to make needs known, had an adequate intake for dinner and snacks, was medication compliant, PRN Trazodone given per pt request. No side effects of medication was reported by the pt or observed by writer. Pt contracts for safety.
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
## 2022-11-17 NOTE — PLAN OF CARE
Problem: Adult Behavioral Health Plan of Care  Goal: Optimized Coping Skills in Response to Life Stressors  Outcome: Not Progressing     Pt presented as alert and oriented to place and self throughout shift.  She was intermittently visible during the day, pacing the bullock and listening to music on occasion.  Pt was dressed appropriately, however appeared slightly unkempt.  She ate meals and was compliant with her medication.  She requested PRN tylenol for headache and hydroxyzine for anxiety.  Pt denied depression.  She denied any symptoms of psychosis.  Pt did not endorse acute physical health concerns or side effects to medications this shift.  She had her final hearing today at 1300.

## 2022-11-17 NOTE — PROGRESS NOTES
PSYCHIATRY PROGRESS NOTE         DATE OF SERVICE:   11/17/2022         CHIEF COMPLAINT:     Anxious because commitment hearing was moved to 1 pm          OBJECTIVE:     Nursing reports : compliant with medications and groups      reports working on outpatient referrals, cooridnating commitment with court          SUBJECTIVE:      Christina says she is anxious because commitment hearing was moved to 1 PM.  She says that she can go to outpatient chemical dependency treatment.  She says she wants to take medications.  She presently denies cravings for drugs and alcohol.  She denies suicidal homicidal ideas, delusions and hallucinations.  She tolerates Depakote and Invega well.  She has headache when she takes medications in the morning. She takes the same medications at night and she does not have headaches at night. She says that headache may be due to anxiety.  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  She says that she does not have anyone to spend  Thanksgiving with, unless her brother invites her  to his house.She says she usually does not celebrate it. The option of staying with her niece did not work. She denies other medical problems.         MEDICATIONS:       busPIRone  10 mg Oral TID     divalproex sodium delayed-release  500 mg Oral Q12H Counts include 234 beds at the [facility] (08/20)     influenza recomb quadrivalent PF  0.5 mL Intramuscular Prior to discharge     nicotine  1 patch Transdermal Daily     nicotine   Transdermal Q8H     paliperidone  3 mg Oral At Bedtime     acetaminophen, alum & mag hydroxide-simethicone, hydrOXYzine, nicotine, OLANZapine **OR** OLANZapine, senna-docusate, traZODone    Medication adherence: Yes  Medication side effects: No  Benefit: Symptom reduction         ROS:   As per history of present illness, otherwise reminder of review of systems is negative for: General, eyes, ears, nose, throat, neck, respiratory, cardiovascular, gastrointestinal, genitourinary, meniscal skeletal, neurological,  "hematological, dermatological and endocrine system.         MENTAL STATUS EXAM:   /67 (BP Location: Left arm, Patient Position: Sitting, Cuff Size: Adult Regular)   Pulse 77   Temp 97.9  F (36.6  C) (Oral)   Resp 16   Ht 1.575 m (5' 2\")   Wt 64.9 kg (143 lb 1.6 oz)   SpO2 100%   BMI 26.17 kg/m      Appearance:fair hygiene  Orientation:x3  Speech:fluent  Language ability: intact  Thought process: concrete   Thought content: denies delusions and hallucinations  Associations: Connected  Suicidal Ideation: denies   Homicidal Ideation: denies   Mood: less labile  Affect: irritable    Intellectual functioning:average  Fund of Knowledge: consistent with medications and experience   Attention/Concentration: decreased  Memory: intact  Psychomotor Behavior: less agitated   Muscle Strength and Tone: no atrophy or involuntary movement  Gait and Station: steady  Insight and judgement:iinadequate          LABS:   personally reviewed.   Lab Results   Component Value Date     11/05/2022     05/23/2022     05/23/2022    CO2 26 11/05/2022    CO2 24 05/23/2022    CO2 25 05/23/2022    CO2 25 01/04/2022    BUN 18.4 11/05/2022    BUN 14 05/23/2022    BUN 14 05/23/2022    BUN 9 01/04/2022     No results found for: CKTOTAL, CKMB, TROPONINI  Lab Results   Component Value Date    WBC 8.9 11/05/2022    WBC 4.6 05/26/2022    WBC 5.0 05/25/2022    HGB 12.5 11/05/2022    HGB 13.1 05/26/2022    HGB 13.0 05/25/2022    HCT 38.0 11/05/2022    HCT 39.6 05/26/2022    HCT 39.6 05/25/2022    MCV 94 11/05/2022    MCV 94 05/26/2022    MCV 95 05/25/2022     11/05/2022     05/26/2022     05/25/2022      Latest Reference Range & Units 11/05/22 17:16 11/05/22 17:29   Sodium 136 - 145 mmol/L   139   Potassium 3.4 - 5.3 mmol/L   3.6   Chloride 98 - 107 mmol/L   104   Carbon Dioxide (CO2) 22 - 29 mmol/L   26   Urea Nitrogen 6.0 - 20.0 mg/dL   18.4   Creatinine 0.51 - 0.95 mg/dL   1.03 (H)   GFR Estimate >60 " mL/min/1.73m2   66   Calcium 8.6 - 10.0 mg/dL   8.8   Anion Gap 7 - 15 mmol/L   9   Albumin 3.5 - 5.2 g/dL   3.6   Protein Total 6.4 - 8.3 g/dL   6.4   Alkaline Phosphatase 35 - 104 U/L   87   ALT 10 - 35 U/L   22   AST 10 - 35 U/L   24   Bilirubin Total <=1.2 mg/dL   0.4   Glucose 70 - 99 mg/dL   135 (H)   HCG Qual Urine Negative  Negative     WBC 4.0 - 11.0 10e3/uL   8.9   Hemoglobin 11.7 - 15.7 g/dL   12.5   Hematocrit 35.0 - 47.0 %   38.0   Platelet Count 150 - 450 10e3/uL   340   RBC Count 3.80 - 5.20 10e6/uL   4.04   MCV 78 - 100 fL   94   MCH 26.5 - 33.0 pg   30.9   MCHC 31.5 - 36.5 g/dL   32.9   RDW 10.0 - 15.0 %   12.1   % Neutrophils %   64   % Lymphocytes %   26   % Monocytes %   5   % Eosinophils %   4   % Basophils %   1   Absolute Basophils 0.0 - 0.2 10e3/uL   0.1   Absolute Eosinophils 0.0 - 0.7 10e3/uL   0.4   Absolute Immature Granulocytes <=0.4 10e3/uL   0.0   Absolute Lymphocytes 0.8 - 5.3 10e3/uL   2.3   Absolute Monocytes 0.0 - 1.3 10e3/uL   0.4   % Immature Granulocytes %   0   Absolute Neutrophils 1.6 - 8.3 10e3/uL   5.8   Absolute NRBCs 10e3/uL   0.0   NRBCs per 100 WBC <1 /100   0   SARS CoV2 PCR Negative  Negative     Lithium Level 0.6 - 1.2 mmol/L   <0.1 (L)   Salicylate mg/dL   1.3   Amphetamine Qual Urine Screen Negative  Screen Positive !     Benzodiazepine Urine Screen Negative  Screen Negative     Opiates Qualitative Urine Screen Negative  Screen Negative     Cannabinoids Qual Urine Screen Negative  Screen Negative     Barbiturates Qual Urine Screen Negative  Screen Negative     Cocaine Urine Screen Negative  Screen Positive !                     Ventricular Rate BPM 67  84     Atrial Rate BPM 67  84     PA Interval ms 132  124     QRS Duration ms 88  80     QT ms 416  414     QTc ms 439  489     P Axis degrees 65  45     R AXIS degrees 59  43     T Axis degrees 77  94     Interpretation ECG   Sinus rhythm   Normal ECG   When compared with ECG of 04-JAN-2022 17:04,   T wave inversion  less evident in Anterolateral leads   Confirmed by MD ROCHELLE, PATRICE ([id number]),  MELINDA POST ([id number]) on 5/27/2022 8:20:45 AM  Sinus rhythm   Nonspecific T wave abnormality   Prolonged QT   Abnormal ECG   When compared with ECG of 04-JAN-2022 16:15, (unconfirmed)   No significant change was found   Confirmed by - EMERGENCY ROOM, PHYSICIAN ([id number]),  NAT DELGADO ([id number]) on 1/5/2022 6:42:36 AM              DIAGNOSIS:     Bipolar disorder manic severe with psychotic features  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  Amphetamine abuse   Amphetamine induced  psychosis with hallucinations   Nicotine use disorder    Cocaine abuse  Alcohol abuse    Patient Active Problem List   Diagnosis     Psychosis (H)     Bipolar affective disorder, currently manic, severe, with psychotic features (H)     Amphetamine abuse (H)     Agitation     Psychosis, unspecified psychosis type (H)     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 She is going through commitment process. MICD tx requested. Preliminary hearing was  Yesterday and final hearing on 11/17/2022.She had rule 25 completed and intensive outpatient treatment recommended.she agrees to start Invega while waiting for [facility] neuroleptic order, and she signed neuroleptic consent.  However based on her prior noncompliance with medications, she will need [facility] neuroleptic order.  These are [place]  recommendations:  Medications:  Invega 3 mg nightly for mood stabilization,neuroleptic consent signed    Buspar 10 mg tid for anxiety  Depakote DR  500 mg bid for  mood stabilization   Hydroxyzine 50 mg q4 hours prn anxiety  Zyprexa 10 mg tid prn for agitation,psychosis  Nicotine patch 14 mg qday for nicotine use  disorder  Nicotine gum 2 mg q1 hour prn nicotine use disorder   We discussed side effects, benefits and alternative treatment and patient agrees    Suicide precautions  Assault precautions   We discussed side effects, benefits and alternative treatments and patient agrees   Rule 25 for chemical dependency treatment   will collect collateral information and make outpatient referrals  Staff to provide emotional support and redirect as needed  Patient encouraged to attend groups  Lab results: Reviewed personally, check fasting lipids and glucose for neuroleptic baseline , check depakote level 11/21/bfore am dose   Consultation: According to patient symptom report    Risk Assessment: going through commitment process due to mood lability, psychosis,     Coordination of Care:   Patient seen, medical record reviewed, care coordinated with the team.    Total time:  More than 25 minutes  spent on this visit with more than 50% time  spent on coordination of care with staff, reviewing medical record, psycho educations, providing supportive therapy regarding above issues,entering orders and preparing documentation for the visit.    This document is created with the help of Dragon dictation system.  All grammatical/typing errors or context distortion are unintentional and inherent to software.    Shaina Sanchez MD         Re-Certification I certify that the inpatient psychiatric facility services furnished since the previous certification were, and continue to be, medically necessary for, either, treatment which could reasonably be expected to improve the patient s condition or diagnostic study and that the hospital records indicate that the services furnished were, either, intensive treatment services, admission and related services necessary for diagnostic study, or equivalent services.     I certify that the patient continues to need, on a daily basis, active treatment furnished directly by or requiring the  supervision of inpatient psychiatric facility personnel.   I estimate TBD days of hospitalization is necessary for proper treatment of the patient. My plans for post-hospital care for this patient are : Medications, appointments     Shaina Sanchez MD

## 2022-11-17 NOTE — PLAN OF CARE
Pt appeared asleep for 5.25  Hours. No behavioral and medical concerns noted. Remains 15 minutes safety check. Trazodone and atarax taken for sleep. Will continue POC.

## 2022-11-18 LAB — SARS-COV-2 RNA RESP QL NAA+PROBE: NEGATIVE

## 2022-11-18 PROCEDURE — 99232 SBSQ HOSP IP/OBS MODERATE 35: CPT | Performed by: PSYCHIATRY & NEUROLOGY

## 2022-11-18 PROCEDURE — 90682 RIV4 VACC RECOMBINANT DNA IM: CPT | Performed by: PSYCHIATRY & NEUROLOGY

## 2022-11-18 PROCEDURE — 124N000002 HC R&B MH UMMC

## 2022-11-18 PROCEDURE — 250N000013 HC RX MED GY IP 250 OP 250 PS 637: Performed by: CLINICAL NURSE SPECIALIST

## 2022-11-18 PROCEDURE — 250N000011 HC RX IP 250 OP 636: Performed by: PSYCHIATRY & NEUROLOGY

## 2022-11-18 PROCEDURE — 250N000013 HC RX MED GY IP 250 OP 250 PS 637: Performed by: PSYCHIATRY & NEUROLOGY

## 2022-11-18 PROCEDURE — U0005 INFEC AGEN DETEC AMPLI PROBE: HCPCS | Performed by: PSYCHIATRY & NEUROLOGY

## 2022-11-18 PROCEDURE — G0008 ADMIN INFLUENZA VIRUS VAC: HCPCS | Performed by: PSYCHIATRY & NEUROLOGY

## 2022-11-18 RX ORDER — POLYETHYLENE GLYCOL 3350 17 G
2 POWDER IN PACKET (EA) ORAL
Status: DISCONTINUED | OUTPATIENT
Start: 2022-11-18 | End: 2022-11-21 | Stop reason: HOSPADM

## 2022-11-18 RX ADMIN — PALIPERIDONE 3 MG: 3 TABLET, EXTENDED RELEASE ORAL at 19:42

## 2022-11-18 RX ADMIN — BUSPIRONE HYDROCHLORIDE 10 MG: 10 TABLET ORAL at 19:43

## 2022-11-18 RX ADMIN — TRAZODONE HYDROCHLORIDE 50 MG: 50 TABLET ORAL at 21:33

## 2022-11-18 RX ADMIN — NICOTINE POLACRILEX 2 MG: 2 LOZENGE ORAL at 21:33

## 2022-11-18 RX ADMIN — DIVALPROEX SODIUM 500 MG: 500 TABLET, DELAYED RELEASE ORAL at 07:51

## 2022-11-18 RX ADMIN — INFLUENZA A VIRUS A/WISCONSIN/588/2019 (H1N1) RECOMBINANT HEMAGGLUTININ ANTIGEN, INFLUENZA A VIRUS A/DARWIN/6/2021 (H3N2) RECOMBINANT HEMAGGLUTININ ANTIGEN, INFLUENZA B VIRUS B/AUSTRIA/1359417/2021 RECOMBINANT HEMAGGLUTININ ANTIGEN, AND INFLUENZA B VIRUS B/PHUKET/3073/2013 RECOMBINANT HEMAGGLUTININ ANTIGEN 0.5 ML: 45; 45; 45; 45 INJECTION INTRAMUSCULAR at 09:59

## 2022-11-18 RX ADMIN — NICOTINE POLACRILEX 2 MG: 2 GUM, CHEWING BUCCAL at 07:51

## 2022-11-18 RX ADMIN — BUSPIRONE HYDROCHLORIDE 10 MG: 10 TABLET ORAL at 07:51

## 2022-11-18 RX ADMIN — NICOTINE 1 PATCH: 14 PATCH, EXTENDED RELEASE TRANSDERMAL at 07:51

## 2022-11-18 RX ADMIN — DIVALPROEX SODIUM 500 MG: 500 TABLET, DELAYED RELEASE ORAL at 19:43

## 2022-11-18 RX ADMIN — NICOTINE POLACRILEX 2 MG: 2 LOZENGE ORAL at 19:45

## 2022-11-18 RX ADMIN — HYDROXYZINE HYDROCHLORIDE 50 MG: 50 TABLET, FILM COATED ORAL at 11:16

## 2022-11-18 RX ADMIN — BUSPIRONE HYDROCHLORIDE 10 MG: 10 TABLET ORAL at 14:34

## 2022-11-18 ASSESSMENT — ACTIVITIES OF DAILY LIVING (ADL)
ADLS_ACUITY_SCORE: 29
ORAL_HYGIENE: INDEPENDENT
ADLS_ACUITY_SCORE: 29
DRESS: SCRUBS (BEHAVIORAL HEALTH)
LAUNDRY: WITH SUPERVISION
ADLS_ACUITY_SCORE: 29
ADLS_ACUITY_SCORE: 29
HYGIENE/GROOMING: INDEPENDENT
ADLS_ACUITY_SCORE: 29
ADLS_ACUITY_SCORE: 29

## 2022-11-18 NOTE — PLAN OF CARE
RN Shift Summary     Patient presented with a calm/irritable affect today. She felt good about how court went yesterday, and she was hopeful that she could discharge today. She was frustrated to learn that she had to wait to discharge next week. However, she was able to find ways to keep occupied; she spent time pacing, listening to music, making phone calls, and pacing. She denies SI/SIB/HI at this time. She endorses some anxiety, but stated that it is manageable. She took a shower today and ate 100% of meals.    Vitals: B/P: 107/57, T: 97.2, P: 87, R: 16

## 2022-11-18 NOTE — PLAN OF CARE
Pt appeared asleep for 7  Hours. No behavioral and medical concerns noted. Remains 15 minutes safety check. Trazodone and atarax taken for sleep. Will continue POC.

## 2022-11-18 NOTE — PROGRESS NOTES
PSYCHIATRY PROGRESS NOTE         DATE OF SERVICE:   11/18/2022         CHIEF COMPLAINT:     Response to medications, now under commitment, asks if she can visit family in [place], under commitment             OBJECTIVE:     Nursing reports : compliant with medications and groups      reports working on outpatient referrals, cooridnating commitment with court          SUBJECTIVE:      Christina is under commitment with [facility] neuroleptic treatment. She says that she wants to take medications. She says they are helping. She denies suicidal and homicidal ideas, delusions and hallucinations. She says that her mood is better controlled, improved mood lability. She says that she would like to go to [place] to spend time with her daughter and grandchildren.  will check with Christina's court appointed , if Christina could go. 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 She denies side effects of medications. No altercations with staff and patients. We discussed importance of compliance with medications and appointments, communication with providers, abstinence from drugs and alcohol. She says she will follow recommendations of court for the time of commitment. I recommended that it would be in her best interest to follow it after commitment too, so she does not end up in the hospital again.          MEDICATIONS:       busPIRone  10 mg Oral TID     divalproex sodium delayed-release  500 mg Oral Q12H [facility] (08/20)     influenza recomb quadrivalent PF  0.5 mL Intramuscular Prior to discharge     nicotine  1 patch Transdermal Daily     nicotine   Transdermal Q8H     paliperidone  3 mg Oral At Bedtime     acetaminophen, alum & mag hydroxide-simethicone, hydrOXYzine, nicotine, OLANZapine **OR** OLANZapine, senna-docusate, traZODone    Medication adherence: Yes  Medication side effects: No  Benefit: Symptom reduction         ROS:   As per history of present  "illness, otherwise reminder of review of systems is negative for: General, eyes, ears, nose, throat, neck, respiratory, cardiovascular, gastrointestinal, genitourinary, meniscal skeletal, neurological, hematological, dermatological and endocrine system.         MENTAL STATUS EXAM:   /57 (BP Location: Right arm)   Pulse 87   Temp 97.5  F (36.4  C) (Oral)   Resp 16   Ht 1.575 m (5' 2\")   Wt 64.9 kg (143 lb 1.6 oz)   SpO2 100%   BMI 26.17 kg/m      Appearance:fair hygiene  Orientation:x3  Speech:fluent  Language ability: intact  Thought process: concrete   Thought content: denies delusions and hallucinations  Associations: Connected  Suicidal Ideation: denies   Homicidal Ideation: denies   Mood: less labile  Affect: less irritaqble   Intellectual functioning:average  Fund of Knowledge: consistent with medications and experience   Attention/Concentration: improving   Memory: intact  Psychomotor Behavior: less agitated   Muscle Strength and Tone: no atrophy or involuntary movement  Gait and Station: steady  Insight and judgement:iinadequate          LABS:   personally reviewed.   Lab Results   Component Value Date     11/05/2022     05/23/2022     05/23/2022    CO2 26 11/05/2022    CO2 24 05/23/2022    CO2 25 05/23/2022    CO2 25 01/04/2022    BUN 18.4 11/05/2022    BUN 14 05/23/2022    BUN 14 05/23/2022    BUN 9 01/04/2022     No results found for: CKTOTAL, CKMB, TROPONINI  Lab Results   Component Value Date    WBC 8.9 11/05/2022    WBC 4.6 05/26/2022    WBC 5.0 05/25/2022    HGB 12.5 11/05/2022    HGB 13.1 05/26/2022    HGB 13.0 05/25/2022    HCT 38.0 11/05/2022    HCT 39.6 05/26/2022    HCT 39.6 05/25/2022    MCV 94 11/05/2022    MCV 94 05/26/2022    MCV 95 05/25/2022     11/05/2022     05/26/2022     05/25/2022      Latest Reference Range & Units 11/05/22 17:16 11/05/22 17:29   Sodium 136 - 145 mmol/L   139   Potassium 3.4 - 5.3 mmol/L   3.6   Chloride 98 - 107 " mmol/L   104   Carbon Dioxide (CO2) 22 - 29 mmol/L   26   Urea Nitrogen 6.0 - 20.0 mg/dL   18.4   Creatinine 0.51 - 0.95 mg/dL   1.03 (H)   GFR Estimate >60 mL/min/1.73m2   66   Calcium 8.6 - 10.0 mg/dL   8.8   Anion Gap 7 - 15 mmol/L   9   Albumin 3.5 - 5.2 g/dL   3.6   Protein Total 6.4 - 8.3 g/dL   6.4   Alkaline Phosphatase 35 - 104 U/L   87   ALT 10 - 35 U/L   22   AST 10 - 35 U/L   24   Bilirubin Total <=1.2 mg/dL   0.4   Glucose 70 - 99 mg/dL   135 (H)   HCG Qual Urine Negative  Negative     WBC 4.0 - 11.0 10e3/uL   8.9   Hemoglobin 11.7 - 15.7 g/dL   12.5   Hematocrit 35.0 - 47.0 %   38.0   Platelet Count 150 - 450 10e3/uL   340   RBC Count 3.80 - 5.20 10e6/uL   4.04   MCV 78 - 100 fL   94   MCH 26.5 - 33.0 pg   30.9   MCHC 31.5 - 36.5 g/dL   32.9   RDW 10.0 - 15.0 %   12.1   % Neutrophils %   64   % Lymphocytes %   26   % Monocytes %   5   % Eosinophils %   4   % Basophils %   1   Absolute Basophils 0.0 - 0.2 10e3/uL   0.1   Absolute Eosinophils 0.0 - 0.7 10e3/uL   0.4   Absolute Immature Granulocytes <=0.4 10e3/uL   0.0   Absolute Lymphocytes 0.8 - 5.3 10e3/uL   2.3   Absolute Monocytes 0.0 - 1.3 10e3/uL   0.4   % Immature Granulocytes %   0   Absolute Neutrophils 1.6 - 8.3 10e3/uL   5.8   Absolute NRBCs 10e3/uL   0.0   NRBCs per 100 WBC <1 /100   0   SARS CoV2 PCR Negative  Negative     Lithium Level 0.6 - 1.2 mmol/L   <0.1 (L)   Salicylate mg/dL   1.3   Amphetamine Qual Urine Screen Negative  Screen Positive !     Benzodiazepine Urine Screen Negative  Screen Negative     Opiates Qualitative Urine Screen Negative  Screen Negative     Cannabinoids Qual Urine Screen Negative  Screen Negative     Barbiturates Qual Urine Screen Negative  Screen Negative     Cocaine Urine Screen Negative  Screen Positive !                     Ventricular Rate BPM 67  84     Atrial Rate BPM 67  84     VA Interval ms 132  124     QRS Duration ms 88  80     QT ms 416  414     QTc ms 439  489     P Axis degrees 65  45     R AXIS  degrees 59  43     T Axis degrees 77  94     Interpretation ECG   Sinus rhythm   Normal ECG   When compared with ECG of 04-JAN-2022 17:04,   T wave inversion less evident in Anterolateral leads   Confirmed by MD ROCHELLE, PATRICE ([id number]),  MELINDA POST ([id number]) on 5/27/2022 8:20:45 AM  Sinus rhythm   Nonspecific T wave abnormality   Prolonged QT   Abnormal ECG   When compared with ECG of 04-JAN-2022 16:15, (unconfirmed)   No significant change was found   Confirmed by - EMERGENCY ROOM, PHYSICIAN ([id number]),  NAT DELGADO ([id number]) on 1/5/2022 6:42:36 AM              DIAGNOSIS:     Bipolar disorder manic severe with psychotic features  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  Amphetamine abuse   Amphetamine induced  psychosis with hallucinations   Nicotine use disorder    Cocaine abuse  Alcohol abuse    Patient Active Problem List   Diagnosis     Psychosis (H)     Bipolar affective disorder, currently manic, severe, with psychotic features (H)     Amphetamine abuse (H)     Agitation     Psychosis, unspecified psychosis type (H)     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 She is going through commitment process. MICD tx requested. Preliminary hearing was  Yesterday and final hearing on 11/17/2022.She had rule 25 completed and intensive outpatient treatment recommended.she agrees to start Invega while waiting for [facility] neuroleptic order, and she signed neuroleptic consent.  However based on her prior noncompliance with medications, she will need [facility] neuroleptic order. Paper arrived for  Commitment, but still waiting for [facility].These are [place]  recommendations:  Medications:  Invega 3 mg nightly for mood stabilization,neuroleptic consent signed     Buspar 10 mg tid for anxiety  Depakote DR  500 mg bid for  mood stabilization   Hydroxyzine 50 mg q4 hours prn anxiety  Zyprexa 10 mg tid prn for agitation,psychosis  Nicotine patch 14 mg qday for nicotine use disorder  Nicotine gum 2 mg q1 hour prn nicotine use disorder   We discussed side effects, benefits and alternative treatment and patient agrees    Suicide precautions  Assault precautions   We discussed side effects, benefits and alternative treatments and patient agrees   Rule 25 for chemical dependency treatment   will collect collateral information and make outpatient referrals  Staff to provide emotional support and redirect as needed  Patient encouraged to attend groups  Lab results: Reviewed personally, check fasting lipids and glucose for neuroleptic baseline , check depakote level 11/21/bfore am dose   Consultation: According to patient symptom report    Risk Assessment: going through commitment process due to mood lability, psychosis,     Coordination of Care:   Patient seen, medical record reviewed, care coordinated with the team.    Total time:  More than 25 minutes   spent on this visit with more than 50% time  spent on coordination of care with staff, reviewing medical record, psycho educations, providing supportive therapy regarding above issues,entering orders and preparing documentation for the visit.    This document is created with the help of Dragon dictation system.  All grammatical/typing errors or context distortion are unintentional and inherent to software.    Shaina Sanchez MD         Re-Certification I certify that the inpatient psychiatric facility services furnished since the previous certification were, and continue to be, medically necessary for, either, treatment which could reasonably be expected to improve the patient s condition or diagnostic study and that the hospital records indicate that the services furnished were, either, intensive treatment services,  admission and related services necessary for diagnostic study, or equivalent services.     I certify that the patient continues to need, on a daily basis, active treatment furnished directly by or requiring the supervision of inpatient psychiatric facility personnel.   I estimate TBD days of hospitalization is necessary for proper treatment of the patient. My plans for post-hospital care for this patient are : Medications, appointments     Shaina Sanchez MD

## 2022-11-18 NOTE — PLAN OF CARE
Pt observed pacing the halls with headphones presenting with flat blunted affect and anxious mood. Pt brightens upon approach. Pt denies SI/SIB/HI/AVH as well as depression during check in. Pt endorsed anxiety of 8/10 r/t discharge plan and court paper work. PRN hydroxyzine and trazodone was given. Pt denied any pain or discomfort this shift. Pt was medication compliant and denied any side effects. Pt reported appetite and sleep as adequate. Staff will continue to monitor and support with current POC.
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
## 2022-11-19 PROCEDURE — 124N000002 HC R&B MH UMMC

## 2022-11-19 PROCEDURE — 250N000013 HC RX MED GY IP 250 OP 250 PS 637: Performed by: PSYCHIATRY & NEUROLOGY

## 2022-11-19 PROCEDURE — 250N000013 HC RX MED GY IP 250 OP 250 PS 637: Performed by: CLINICAL NURSE SPECIALIST

## 2022-11-19 RX ADMIN — BUSPIRONE HYDROCHLORIDE 10 MG: 10 TABLET ORAL at 14:14

## 2022-11-19 RX ADMIN — DIVALPROEX SODIUM 500 MG: 500 TABLET, DELAYED RELEASE ORAL at 19:10

## 2022-11-19 RX ADMIN — PALIPERIDONE 3 MG: 3 TABLET, EXTENDED RELEASE ORAL at 19:10

## 2022-11-19 RX ADMIN — BUSPIRONE HYDROCHLORIDE 10 MG: 10 TABLET ORAL at 19:10

## 2022-11-19 RX ADMIN — TRAZODONE HYDROCHLORIDE 50 MG: 50 TABLET ORAL at 22:58

## 2022-11-19 RX ADMIN — NICOTINE POLACRILEX 2 MG: 2 LOZENGE ORAL at 17:08

## 2022-11-19 RX ADMIN — NICOTINE POLACRILEX 2 MG: 2 LOZENGE ORAL at 19:05

## 2022-11-19 RX ADMIN — NICOTINE POLACRILEX 2 MG: 2 LOZENGE ORAL at 13:05

## 2022-11-19 RX ADMIN — BUSPIRONE HYDROCHLORIDE 10 MG: 10 TABLET ORAL at 08:46

## 2022-11-19 RX ADMIN — NICOTINE 1 PATCH: 14 PATCH, EXTENDED RELEASE TRANSDERMAL at 08:46

## 2022-11-19 RX ADMIN — DIVALPROEX SODIUM 500 MG: 500 TABLET, DELAYED RELEASE ORAL at 08:46

## 2022-11-19 RX ADMIN — TRAZODONE HYDROCHLORIDE 50 MG: 50 TABLET ORAL at 21:05

## 2022-11-19 RX ADMIN — NICOTINE POLACRILEX 2 MG: 2 LOZENGE ORAL at 08:51

## 2022-11-19 ASSESSMENT — ACTIVITIES OF DAILY LIVING (ADL)
ADLS_ACUITY_SCORE: 29
ORAL_HYGIENE: INDEPENDENT
ADLS_ACUITY_SCORE: 29
LAUNDRY: WITH SUPERVISION
HYGIENE/GROOMING: INDEPENDENT
ADLS_ACUITY_SCORE: 29
DRESS: INDEPENDENT
ADLS_ACUITY_SCORE: 29
ADLS_ACUITY_SCORE: 29

## 2022-11-19 NOTE — PLAN OF CARE
Patient appeared asleep for 7hrs during the night shift. Patient did not have any behavioral or medical concerns and remain on 15min checks. RN will continue to monitor.

## 2022-11-19 NOTE — PLAN OF CARE
"  Problem: Adult Behavioral Health Plan of Care  Goal: Optimized Coping Skills in Response to Life Stressors  Outcome: Progressing     Problem: Depressive Symptoms  Goal: Social and Therapeutic (Depression)  Description: Signs and symptoms of listed problems will be absent or manageable.  Outcome: Progressing   Goal Outcome Evaluation:    Plan of Care Reviewed With: patient      Behavioral  beginning of the shift from not getting the paperwork that she wanted to work on, the ACP, and the disability insurance packet. Pt rated her anxiety at 8/10 and was going to sleep, \"I have nothing to do; I will just go to my room and sleep.\" The writer discussed on getting searching and printing the paperwork stated above, and she later had a full range affect and happy mood and rated her anxiety at 0.5/10. She said there must be some anxiety since she is in the hospital. Pt denies Depression, SI thoughts, or AVH. Pt appetite remains excellent. She states, \"I had a productive day. At least I can just go and submit this paperwork once I discharge.\" she also wanted a printout of her medications, but the writer, with consultation with coworkers, encouraged her to discuss the request with her provider, did make a writer her current medication she is taking and gave it  Medical Alerts  None  Plan  Continue to monitor       "

## 2022-11-19 NOTE — PLAN OF CARE
"  Problem: Psychotic Symptoms  Goal: Social and Therapeutic (Psychotic Symptoms)  Description: Signs and symptoms of listed problems will be absent or manageable.  Outcome: Progressing   Goal Outcome Evaluation:    Plan of Care Reviewed With: patient      Pt stated \" I want out.\" Pt was observed pacing the halls with headphones on. Endorsed anxiety rated 2/10 and denied depression. Pt denied SI/SIB/HI/AVH. Took a shower. Presented as neat and well groomed. Denied medication side effects. Eating and sleeping well. Did not require prn medications. In good behavioral control.   Plan: Status 15s; Build trust with pt. Continue to build on strengths. Encourage compliance and healthy coping.                    "

## 2022-11-20 PROCEDURE — 124N000002 HC R&B MH UMMC

## 2022-11-20 PROCEDURE — 250N000013 HC RX MED GY IP 250 OP 250 PS 637: Performed by: PSYCHIATRY & NEUROLOGY

## 2022-11-20 PROCEDURE — 250N000013 HC RX MED GY IP 250 OP 250 PS 637: Performed by: CLINICAL NURSE SPECIALIST

## 2022-11-20 RX ADMIN — BUSPIRONE HYDROCHLORIDE 10 MG: 10 TABLET ORAL at 08:26

## 2022-11-20 RX ADMIN — BUSPIRONE HYDROCHLORIDE 10 MG: 10 TABLET ORAL at 13:44

## 2022-11-20 RX ADMIN — BUSPIRONE HYDROCHLORIDE 10 MG: 10 TABLET ORAL at 19:17

## 2022-11-20 RX ADMIN — NICOTINE POLACRILEX 2 MG: 2 LOZENGE ORAL at 21:22

## 2022-11-20 RX ADMIN — DIVALPROEX SODIUM 500 MG: 500 TABLET, DELAYED RELEASE ORAL at 08:26

## 2022-11-20 RX ADMIN — NICOTINE 1 PATCH: 14 PATCH, EXTENDED RELEASE TRANSDERMAL at 08:29

## 2022-11-20 RX ADMIN — TRAZODONE HYDROCHLORIDE 50 MG: 50 TABLET ORAL at 22:55

## 2022-11-20 RX ADMIN — DIVALPROEX SODIUM 500 MG: 500 TABLET, DELAYED RELEASE ORAL at 19:17

## 2022-11-20 RX ADMIN — NICOTINE POLACRILEX 2 MG: 2 LOZENGE ORAL at 19:17

## 2022-11-20 RX ADMIN — PALIPERIDONE 3 MG: 3 TABLET, EXTENDED RELEASE ORAL at 19:17

## 2022-11-20 RX ADMIN — TRAZODONE HYDROCHLORIDE 50 MG: 50 TABLET ORAL at 21:22

## 2022-11-20 RX ADMIN — NICOTINE POLACRILEX 2 MG: 2 LOZENGE ORAL at 16:24

## 2022-11-20 ASSESSMENT — ACTIVITIES OF DAILY LIVING (ADL)
HYGIENE/GROOMING: INDEPENDENT
ADLS_ACUITY_SCORE: 29
ORAL_HYGIENE: INDEPENDENT
ADLS_ACUITY_SCORE: 29
LAUNDRY: WITH SUPERVISION
ADLS_ACUITY_SCORE: 29
LAUNDRY: WITH SUPERVISION
DRESS: SCRUBS (BEHAVIORAL HEALTH);INDEPENDENT
ADLS_ACUITY_SCORE: 29
DRESS: INDEPENDENT
ADLS_ACUITY_SCORE: 29
ORAL_HYGIENE: INDEPENDENT
ADLS_ACUITY_SCORE: 29
ADLS_ACUITY_SCORE: 29
HYGIENE/GROOMING: INDEPENDENT
ADLS_ACUITY_SCORE: 29
ADLS_ACUITY_SCORE: 29

## 2022-11-20 NOTE — PLAN OF CARE
"  Problem: Suicidal Behavior  Goal: Suicidal Behavior is Absent or Managed  Outcome: Progressing   Goal Outcome Evaluation:    Plan of Care Reviewed With: patient      Pt stated \" I don't have nothing \" during check-in. Pt was visible in the lounge off and on. Pt denied SI/SIB/HI/AVH. Took a shower. Presented as neat and well groomed. Denied medication side effects. Eating and sleeping well. Did not require prn medications. Socialized with select peer.  Plan: Status 15s; Build trust with pt. Continue to build on strengths. Encourage compliance and healthy coping.                     "

## 2022-11-20 NOTE — PLAN OF CARE
Problem: Psychotic Symptoms  Goal: Social and Therapeutic (Psychotic Symptoms)  Description: Signs and symptoms of listed problems will be absent or manageable.  11/19/2022 2120 by Vik Camarillo RN  Outcome: Progressing  11/19/2022 1558 by Vik Camarillo RN  Outcome: Progressing   Goal Outcome Evaluation:    Plan of Care Reviewed With: patient      Pt was visible in the lounge pacing the hallway with headphones on. Was observed playing cribbage with a male peer in the lounge. Pt was animated and happy while playing the game. Pt reported low anxiety and zero depression. Was medication compliant. Requested and received prn Trazodone for sleep. Denied SI/SIB/HI/AVH. Presented neat and well groomed. Denied medication side effects. Eating and sleeping adequate per pt.   Plan: Status 15s; Build trust with pt. Continue to build on strengths. Encourage compliance and healthy coping.

## 2022-11-20 NOTE — PLAN OF CARE
Patient appeared asleep for 6.75hrs during the night shift. Patient did not have any behavioral or medical concerns and remain on 15min checks. RN will continue to monitor.

## 2022-11-21 ENCOUNTER — DOCUMENTATION ONLY (OUTPATIENT)
Dept: BEHAVIORAL HEALTH | Facility: HOSPITAL | Age: 52
End: 2022-11-21

## 2022-11-21 VITALS
HEART RATE: 89 BPM | WEIGHT: 147.6 LBS | BODY MASS INDEX: 27.16 KG/M2 | RESPIRATION RATE: 16 BRPM | OXYGEN SATURATION: 98 % | SYSTOLIC BLOOD PRESSURE: 100 MMHG | TEMPERATURE: 97.4 F | DIASTOLIC BLOOD PRESSURE: 54 MMHG | HEIGHT: 62 IN

## 2022-11-21 PROCEDURE — 250N000013 HC RX MED GY IP 250 OP 250 PS 637: Performed by: CLINICAL NURSE SPECIALIST

## 2022-11-21 PROCEDURE — 250N000013 HC RX MED GY IP 250 OP 250 PS 637: Performed by: PSYCHIATRY & NEUROLOGY

## 2022-11-21 PROCEDURE — 99239 HOSP IP/OBS DSCHRG MGMT >30: CPT | Performed by: PSYCHIATRY & NEUROLOGY

## 2022-11-21 RX ORDER — PALIPERIDONE 3 MG/1
3 TABLET, EXTENDED RELEASE ORAL AT BEDTIME
Qty: 30 TABLET | Refills: 0 | Status: ON HOLD | OUTPATIENT
Start: 2022-11-21 | End: 2023-02-13

## 2022-11-21 RX ORDER — DIVALPROEX SODIUM 500 MG/1
500 TABLET, DELAYED RELEASE ORAL EVERY 12 HOURS
Qty: 60 TABLET | Refills: 0 | Status: ON HOLD | OUTPATIENT
Start: 2022-11-21 | End: 2023-02-13

## 2022-11-21 RX ORDER — HYDROXYZINE HYDROCHLORIDE 50 MG/1
50 TABLET, FILM COATED ORAL EVERY 4 HOURS PRN
Qty: 30 TABLET | Refills: 0 | Status: ON HOLD | OUTPATIENT
Start: 2022-11-21 | End: 2023-02-13

## 2022-11-21 RX ORDER — TRAZODONE HYDROCHLORIDE 50 MG/1
50 TABLET, FILM COATED ORAL
Qty: 30 TABLET | Refills: 0 | Status: ON HOLD | OUTPATIENT
Start: 2022-11-21 | End: 2023-02-13

## 2022-11-21 RX ORDER — BUSPIRONE HYDROCHLORIDE 10 MG/1
10 TABLET ORAL 3 TIMES DAILY
Qty: 90 TABLET | Refills: 0 | Status: ON HOLD | OUTPATIENT
Start: 2022-11-21 | End: 2023-02-13

## 2022-11-21 RX ADMIN — NICOTINE 1 PATCH: 14 PATCH, EXTENDED RELEASE TRANSDERMAL at 08:41

## 2022-11-21 RX ADMIN — BUSPIRONE HYDROCHLORIDE 10 MG: 10 TABLET ORAL at 14:03

## 2022-11-21 RX ADMIN — HYDROXYZINE HYDROCHLORIDE 50 MG: 50 TABLET, FILM COATED ORAL at 17:13

## 2022-11-21 RX ADMIN — BUSPIRONE HYDROCHLORIDE 10 MG: 10 TABLET ORAL at 08:41

## 2022-11-21 RX ADMIN — NICOTINE POLACRILEX 2 MG: 2 LOZENGE ORAL at 17:13

## 2022-11-21 RX ADMIN — NICOTINE POLACRILEX 2 MG: 2 LOZENGE ORAL at 15:08

## 2022-11-21 RX ADMIN — HYDROXYZINE HYDROCHLORIDE 50 MG: 50 TABLET, FILM COATED ORAL at 02:12

## 2022-11-21 RX ADMIN — HYDROXYZINE HYDROCHLORIDE 50 MG: 50 TABLET, FILM COATED ORAL at 12:43

## 2022-11-21 RX ADMIN — DIVALPROEX SODIUM 500 MG: 500 TABLET, DELAYED RELEASE ORAL at 08:41

## 2022-11-21 ASSESSMENT — ACTIVITIES OF DAILY LIVING (ADL)
ADLS_ACUITY_SCORE: 29

## 2022-11-21 NOTE — PLAN OF CARE
"  Problem: Suicidal Behavior  Goal: Suicidal Behavior is Absent or Managed  Outcome: Progressing   Goal Outcome Evaluation:    Plan of Care Reviewed With: patient      Behavioral  Pt was visible in the milieu this shift and interacted appropriately with staff and peers. Pt ate 100% of their meal. Pt rated anxiety \"let's remain at 0.5/10), a rating she gave to the writer last time after he printer the social security material she wanted from a [employer] site, after which she dropped her anxiety from 7/10 to 0.5/10. Pts mood was calm and cooperative, and she presented with a neutral affect. She denies SI thoughts, depression, or AVH.  Medical Alerts  None  Plan  Continue to monitor     "

## 2022-11-21 NOTE — PLAN OF CARE
Patient appeared asleep for 6hrs during the night shift. Patient did not have any behavioral or medical concerns and remain on 15min checks. RN will continue to monitor.

## 2022-11-21 NOTE — PLAN OF CARE
Goal Outcome Evaluation:  Patient was discharged home with her family per orders. AVS summary discussed. Patient was discharged with medications. No home med's. Patient was also discharged with all her belongings. Patient was escorted to the exit by staff. Patient was transported by her family.

## 2022-11-21 NOTE — PLAN OF CARE
Problem: Adult Behavioral Health Plan of Care  Goal: Plan of Care Review  Outcome: Progressing  Flowsheets (Taken 11/21/2022 1000)  Patient Agreement with Plan of Care: agrees     Problem: Suicidal Behavior  Goal: Suicidal Behavior is Absent or Managed  Outcome: Progressing   Goal Outcome Evaluation:    Plan of Care Reviewed With: patient    Patient has been walking the hallway, listening to headphone, told her daughter to come to hospital as patient has discharge plan to go back to [facility] for the Thanksgiving holiday then return for treatment. Patient is medication compliant. Requested prn hydroxyzine for anxiety 7/10 as she wants to discharge today. Waiting for [facility] to given approval as patent is committed. Patient does not have safe plan for discharge as patient plans on going to daughters then has changed her discharge plan a few times in past week. She denies pain, SI, HI, SIB and depression, but endorses anxiety. PRN is effective. Patient ate 100% both meals, She attended groups. VSS no complaint of SE of medication. Contracts for safety and will continue to monitor. Waiting for [facility] for discharge approval. And orders from the provider.
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
## 2022-11-22 NOTE — PROGRESS NOTES
Prior Authorization Approval    Paliperidone ER 3MG er tablets  Date Initiated: 11/21/2022  Date Completed: 11/21/2022  Prior Auth Type: Clinical                Status: Approved    Effective Date: 10/22/2022 - 11/21/2023  Copay: 0.00     Filling Pharmacy: [facility] [address]    Insurance: [email or web address] - Phone [phone] Fax [phone]  ID: [id number]  [facility] Key/Case Number: [id number] / [id number]   Submitted Via: Emma Iverson  [facility] Liaison  Ph: [phone] Pager: [phone]

## 2023-01-11 DIAGNOSIS — F31.2 BIPOLAR AFFECTIVE DISORDER, CURRENTLY MANIC, SEVERE, WITH PSYCHOTIC FEATURES (H): ICD-10-CM

## 2023-01-11 DIAGNOSIS — F99 INSOMNIA DUE TO OTHER MENTAL DISORDER: ICD-10-CM

## 2023-01-11 DIAGNOSIS — F51.05 INSOMNIA DUE TO OTHER MENTAL DISORDER: ICD-10-CM

## 2023-01-11 DIAGNOSIS — F41.9 ANXIETY: ICD-10-CM

## 2023-01-24 RX ORDER — PALIPERIDONE 3 MG/1
3 TABLET, EXTENDED RELEASE ORAL AT BEDTIME
Qty: 30 TABLET | Refills: 0 | OUTPATIENT
Start: 2023-01-24

## 2023-01-24 RX ORDER — DIVALPROEX SODIUM 500 MG/1
500 TABLET, DELAYED RELEASE ORAL EVERY 12 HOURS
Qty: 60 TABLET | Refills: 0 | OUTPATIENT
Start: 2023-01-24

## 2023-01-24 RX ORDER — HYDROXYZINE HYDROCHLORIDE 50 MG/1
50 TABLET, FILM COATED ORAL EVERY 4 HOURS PRN
Qty: 30 TABLET | Refills: 0 | OUTPATIENT
Start: 2023-01-24

## 2023-01-24 RX ORDER — BUSPIRONE HYDROCHLORIDE 10 MG/1
10 TABLET ORAL 3 TIMES DAILY
Qty: 90 TABLET | Refills: 0 | OUTPATIENT
Start: 2023-01-24

## 2023-01-24 RX ORDER — TRAZODONE HYDROCHLORIDE 50 MG/1
50 TABLET, FILM COATED ORAL
Qty: 30 TABLET | Refills: 0 | OUTPATIENT
Start: 2023-01-24

## 2023-01-27 ENCOUNTER — TELEPHONE (OUTPATIENT)
Dept: BEHAVIORAL HEALTH | Facility: CLINIC | Age: 53
End: 2023-01-27

## 2023-01-27 ENCOUNTER — HOSPITAL ENCOUNTER (EMERGENCY)
Facility: CLINIC | Age: 53
Discharge: PSYCHIATRIC OR SUBSTANCE USE FACILITY | End: 2023-01-31
Attending: EMERGENCY MEDICINE | Admitting: EMERGENCY MEDICINE
Payer: COMMERCIAL

## 2023-01-27 DIAGNOSIS — F31.2 BIPOLAR AFFECTIVE DISORDER, CURRENTLY MANIC, SEVERE, WITH PSYCHOTIC FEATURES (H): ICD-10-CM

## 2023-01-27 DIAGNOSIS — F22 PARANOIA (H): ICD-10-CM

## 2023-01-27 DIAGNOSIS — G47.00 INSOMNIA, UNSPECIFIED TYPE: ICD-10-CM

## 2023-01-27 LAB
ANION GAP SERPL CALCULATED.3IONS-SCNC: 13 MMOL/L (ref 7–15)
BASOPHILS # BLD AUTO: 0 10E3/UL (ref 0–0.2)
BASOPHILS NFR BLD AUTO: 0 %
BUN SERPL-MCNC: 15 MG/DL (ref 6–20)
CALCIUM SERPL-MCNC: 9.4 MG/DL (ref 8.6–10)
CHLORIDE SERPL-SCNC: 104 MMOL/L (ref 98–107)
CREAT SERPL-MCNC: 0.9 MG/DL (ref 0.51–0.95)
DEPRECATED HCO3 PLAS-SCNC: 22 MMOL/L (ref 22–29)
EOSINOPHIL # BLD AUTO: 0.1 10E3/UL (ref 0–0.7)
EOSINOPHIL NFR BLD AUTO: 1 %
ERYTHROCYTE [DISTWIDTH] IN BLOOD BY AUTOMATED COUNT: 11.9 % (ref 10–15)
ETHANOL SERPL-MCNC: <0.01 G/DL
GFR SERPL CREATININE-BSD FRML MDRD: 78 ML/MIN/1.73M2
GLUCOSE SERPL-MCNC: 102 MG/DL (ref 70–99)
HCT VFR BLD AUTO: 40.2 % (ref 35–47)
HGB BLD-MCNC: 13.7 G/DL (ref 11.7–15.7)
IMM GRANULOCYTES # BLD: 0 10E3/UL
IMM GRANULOCYTES NFR BLD: 0 %
LYMPHOCYTES # BLD AUTO: 2.4 10E3/UL (ref 0.8–5.3)
LYMPHOCYTES NFR BLD AUTO: 32 %
MCH RBC QN AUTO: 31.1 PG (ref 26.5–33)
MCHC RBC AUTO-ENTMCNC: 34.1 G/DL (ref 31.5–36.5)
MCV RBC AUTO: 91 FL (ref 78–100)
MONOCYTES # BLD AUTO: 0.5 10E3/UL (ref 0–1.3)
MONOCYTES NFR BLD AUTO: 7 %
NEUTROPHILS # BLD AUTO: 4.4 10E3/UL (ref 1.6–8.3)
NEUTROPHILS NFR BLD AUTO: 60 %
NRBC # BLD AUTO: 0 10E3/UL
NRBC BLD AUTO-RTO: 0 /100
PLATELET # BLD AUTO: 328 10E3/UL (ref 150–450)
POTASSIUM SERPL-SCNC: 3.5 MMOL/L (ref 3.4–5.3)
RBC # BLD AUTO: 4.4 10E6/UL (ref 3.8–5.2)
SARS-COV-2 RNA RESP QL NAA+PROBE: NEGATIVE
SODIUM SERPL-SCNC: 139 MMOL/L (ref 136–145)
VALPROATE SERPL-MCNC: <2.8 UG/ML
WBC # BLD AUTO: 7.3 10E3/UL (ref 4–11)

## 2023-01-27 PROCEDURE — 82077 ASSAY SPEC XCP UR&BREATH IA: CPT | Performed by: EMERGENCY MEDICINE

## 2023-01-27 PROCEDURE — C9803 HOPD COVID-19 SPEC COLLECT: HCPCS

## 2023-01-27 PROCEDURE — 99285 EMERGENCY DEPT VISIT HI MDM: CPT | Mod: 25

## 2023-01-27 PROCEDURE — 250N000013 HC RX MED GY IP 250 OP 250 PS 637: Performed by: EMERGENCY MEDICINE

## 2023-01-27 PROCEDURE — U0005 INFEC AGEN DETEC AMPLI PROBE: HCPCS | Performed by: EMERGENCY MEDICINE

## 2023-01-27 PROCEDURE — 90791 PSYCH DIAGNOSTIC EVALUATION: CPT

## 2023-01-27 PROCEDURE — 80048 BASIC METABOLIC PNL TOTAL CA: CPT | Performed by: EMERGENCY MEDICINE

## 2023-01-27 PROCEDURE — 96372 THER/PROPH/DIAG INJ SC/IM: CPT | Performed by: EMERGENCY MEDICINE

## 2023-01-27 PROCEDURE — 85025 COMPLETE CBC W/AUTO DIFF WBC: CPT | Performed by: EMERGENCY MEDICINE

## 2023-01-27 PROCEDURE — 250N000011 HC RX IP 250 OP 636: Performed by: EMERGENCY MEDICINE

## 2023-01-27 PROCEDURE — 36415 COLL VENOUS BLD VENIPUNCTURE: CPT | Performed by: EMERGENCY MEDICINE

## 2023-01-27 PROCEDURE — 80164 ASSAY DIPROPYLACETIC ACD TOT: CPT | Performed by: EMERGENCY MEDICINE

## 2023-01-27 RX ORDER — OLANZAPINE 10 MG/2ML
10 INJECTION, POWDER, FOR SOLUTION INTRAMUSCULAR DAILY PRN
Status: DISCONTINUED | OUTPATIENT
Start: 2023-01-27 | End: 2023-01-27

## 2023-01-27 RX ORDER — LORAZEPAM 2 MG/ML
2 INJECTION INTRAMUSCULAR ONCE
Status: COMPLETED | OUTPATIENT
Start: 2023-01-27 | End: 2023-01-27

## 2023-01-27 RX ORDER — DIVALPROEX SODIUM 500 MG/1
500 TABLET, DELAYED RELEASE ORAL EVERY 12 HOURS
Status: DISCONTINUED | OUTPATIENT
Start: 2023-01-27 | End: 2023-01-31 | Stop reason: HOSPADM

## 2023-01-27 RX ORDER — ACETAMINOPHEN 325 MG/1
650 TABLET ORAL EVERY 4 HOURS PRN
Status: DISCONTINUED | OUTPATIENT
Start: 2023-01-27 | End: 2023-01-31 | Stop reason: HOSPADM

## 2023-01-27 RX ORDER — LORAZEPAM 1 MG/1
1 TABLET ORAL EVERY 8 HOURS PRN
Status: DISCONTINUED | OUTPATIENT
Start: 2023-01-27 | End: 2023-01-31 | Stop reason: HOSPADM

## 2023-01-27 RX ORDER — BUSPIRONE HYDROCHLORIDE 10 MG/1
10 TABLET ORAL 3 TIMES DAILY
Status: DISCONTINUED | OUTPATIENT
Start: 2023-01-27 | End: 2023-01-31 | Stop reason: HOSPADM

## 2023-01-27 RX ORDER — OLANZAPINE 5 MG/1
10 TABLET, ORALLY DISINTEGRATING ORAL 2 TIMES DAILY
Status: DISCONTINUED | OUTPATIENT
Start: 2023-01-27 | End: 2023-01-31 | Stop reason: HOSPADM

## 2023-01-27 RX ORDER — HYDROXYZINE HYDROCHLORIDE 25 MG/1
50 TABLET, FILM COATED ORAL EVERY 4 HOURS PRN
Status: DISCONTINUED | OUTPATIENT
Start: 2023-01-27 | End: 2023-01-31 | Stop reason: HOSPADM

## 2023-01-27 RX ORDER — TRAZODONE HYDROCHLORIDE 50 MG/1
50 TABLET, FILM COATED ORAL
Status: DISCONTINUED | OUTPATIENT
Start: 2023-01-27 | End: 2023-01-31 | Stop reason: HOSPADM

## 2023-01-27 RX ORDER — OLANZAPINE 10 MG/2ML
10 INJECTION, POWDER, FOR SOLUTION INTRAMUSCULAR ONCE
Status: DISCONTINUED | OUTPATIENT
Start: 2023-01-27 | End: 2023-01-31 | Stop reason: HOSPADM

## 2023-01-27 RX ADMIN — DIVALPROEX SODIUM 500 MG: 500 TABLET, DELAYED RELEASE ORAL at 12:02

## 2023-01-27 RX ADMIN — BUSPIRONE HYDROCHLORIDE 10 MG: 10 TABLET ORAL at 20:25

## 2023-01-27 RX ADMIN — DIVALPROEX SODIUM 500 MG: 500 TABLET, DELAYED RELEASE ORAL at 20:25

## 2023-01-27 RX ADMIN — LORAZEPAM 2 MG: 2 INJECTION INTRAMUSCULAR; INTRAVENOUS at 01:25

## 2023-01-27 RX ADMIN — OLANZAPINE 10 MG: 10 INJECTION, POWDER, FOR SOLUTION INTRAMUSCULAR at 01:25

## 2023-01-27 RX ADMIN — OLANZAPINE 10 MG: 5 TABLET, ORALLY DISINTEGRATING ORAL at 20:25

## 2023-01-27 ASSESSMENT — COLUMBIA-SUICIDE SEVERITY RATING SCALE - C-SSRS
2. HAVE YOU ACTUALLY HAD ANY THOUGHTS OF KILLING YOURSELF?: NO
TOTAL  NUMBER OF INTERRUPTED ATTEMPTS LIFETIME: NO
1. HAVE YOU WISHED YOU WERE DEAD OR WISHED YOU COULD GO TO SLEEP AND NOT WAKE UP?: NO
TOTAL  NUMBER OF ABORTED OR SELF INTERRUPTED ATTEMPTS LIFETIME: NO
6. HAVE YOU EVER DONE ANYTHING, STARTED TO DO ANYTHING, OR PREPARED TO DO ANYTHING TO END YOUR LIFE?: NO
ATTEMPT LIFETIME: NO

## 2023-01-27 ASSESSMENT — ACTIVITIES OF DAILY LIVING (ADL)
ADLS_ACUITY_SCORE: 35

## 2023-01-27 NOTE — ED NOTES
"Pt presented with 72h hold paperwork. She asked why she was being placed on a hold. RN explained to patient she has been acting paranoid and has been saying things that don't make sense. Pt states \"I'm going to need a . I can't believe you're doing this to me again\". Pt has calm demeanor and did not become verbally or physically aggressive with staff.  "
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
## 2023-01-27 NOTE — ED PROVIDER NOTES
Emergency Department                         Assumed Care Note      Patient signed out to me by Dr Blank.    Briefly, Christina Vásquez is a 52 year old female is being evaluated for delusional thoughts/paranoia.    /83   Pulse 91   Temp 96.8  F (36  C) (Temporal)   Resp 18   SpO2 92%     Plan from sign out is: Pending DEC  evaluation.    Progress notes:  ED Course as of 01/28/23 0022 Fri Jan 27, 2023   1833 DEC ([place]) unable to get much history. Patient appear delusional still. Patient still states she came to ER herself but untrue. DEC recommends inpatient treatment. DEC to contact central intake.       Clinical impression:  1. Bipolar affective disorder, currently manic, severe, with psychotic features (H)    2. Paranoia (H)    3. Insomnia, unspecified type      Disposition:  Evaluated by DEC, pending inpatient mental health bed placement.    YADIRA MCALLISTER DO  1/27/2023     Yadira Mcallister DO  01/28/23 0023

## 2023-01-27 NOTE — ED NOTES
RN performed lab draw. Patient agreed and was cooperative with RN at this time. Pt lying in bed with eyes closed, but will wake up and respond when spoken to.

## 2023-01-27 NOTE — ED NOTES
RN attempted to get updated vitals from patient. Pt responsive, but not wanting to have vitals taken at this time.

## 2023-01-27 NOTE — ED PROVIDER NOTES
"  History     Chief Complaint:  Mental Health Problem       History provided by: patient refuses to answer questions.      Christina Vásquez is a 52 year old female with a history of anxiety, depression, bipolar disorder, schizophrenia, psychosis, and polysubstance abuse who presents via EMS for a mental health evaluation. EMS reports that the patient was found by police in a broken down car (out of gas) tonight and was unable to provide history or tell them how she got there. PD adds that she has not been taking her medications, not sleeping well recently, and has been seeing people that are not there. She has been seen in the ED several times for similar symptoms and was most recently admitted in November of 2022. In the past she reported that her head is \"full of information\" and when she is seen by the ED this information is taken and then she is discharged to the \"streets\". Christina is currently paranoid and believes everyone is out to get her, particularly electronic companies that are supposedly stealing her information. She notes that she would like to talk the president or  of an optical company \"now.\" She adds that she would also like staff to get a hold of her old employer and the \"president of this hospital.\" When asked about these requests she states \"It's an emergency.\"     Independent Historian:    EMS and Police/Fire    Review of External Notes: I reviewed Care Everywhere and EMS/PD report.    ROS:  Review of Systems   Unable to perform ROS: Psychiatric disorder     Allergies:  Penicillins     Medications:    Buspar  Depakote  Atarax  Invega  Desyrel     Past Medical History:    Polysubstance abuse  Anxiety   Asthma  Bipolar disorder  Cocaine abuse  Psychosis   Depression   Schizophrenia      Family History:    Daughter: Ovarian cancer    Social History:  Arrives alone via EMS    Physical Exam     Patient Vitals for the past 24 hrs:   BP Temp Temp src Pulse Resp SpO2   01/27/23 0530 97/62 -- -- 65 -- 100 " %   01/27/23 0433 -- 96.8  F (36  C) Temporal -- -- 100 %   01/27/23 0430 -- -- -- 61 -- 100 %   01/27/23 0415 -- -- -- 67 -- 100 %   01/27/23 0400 -- -- -- 65 -- 100 %   01/27/23 0345 -- -- -- 54 -- 100 %   01/27/23 0330 -- -- -- 71 -- 99 %   01/27/23 0315 106/63 -- -- 65 -- 99 %   01/27/23 0245 97/48 -- -- 69 -- 97 %   01/27/23 0230  107/36 -- -- 76 -- 96 %   01/27/23 0215 111/49 -- -- 82 -- 96 %   01/27/23 0200  142/66 -- -- 68 18 96 %   01/27/23 0144  147/77 -- -- 75 -- 98 %   01/27/23 0129  167/95 -- -- 78 -- 96 %   01/27/23 0124  171/97 -- -- -- -- --      Physical Exam  Nursing note and vitals reviewed.  Constitutional: Sitting up cross legged in bed, leaning forward, yelling.   HENT:   Mouth/Throat: Mucous membranes are dry  Cardiovascular: Normal rate, regular rhythm and normal heart sounds.  No murmur.  Pulmonary/Chest: Effort normal and breath sounds normal. No respiratory distress. No wheezes. No rales.   Abdominal: Soft. Normal appearance and bowel sounds are normal. No distension. There is no tenderness.   Neurological: Alert. Strength normal.   Skin: Skin is warm and dry. No rash noted.   Psychiatric: Patient has pressure speech, inflated ideas, and is tangential with paranoid ideation.     Emergency Department Course     Laboratory:  Labs Ordered and Resulted from Time of ED Arrival to Time of ED Departure   BASIC METABOLIC PANEL - Abnormal       Result Value    Sodium 139      Potassium 3.5      Chloride 104      Carbon Dioxide (CO2) 22      Anion Gap 13      Urea Nitrogen 15.0      Creatinine 0.90      Calcium 9.4      Glucose 102 (*)     GFR Estimate 78     VALPROIC ACID - Abnormal    Valproic acid <2.8 (*)    ETHYL ALCOHOL LEVEL - Normal    Alcohol ethyl <0.01     COVID-19 VIRUS (CORONAVIRUS) BY PCR - Normal    SARS CoV2 PCR Negative     CBC WITH PLATELETS AND DIFFERENTIAL    WBC Count 7.3      RBC Count 4.40      Hemoglobin 13.7      Hematocrit 40.2      MCV 91      MCH 31.1      MCHC 34.1       RDW 11.9      Platelet Count 328      % Neutrophils 60      % Lymphocytes 32      % Monocytes 7      % Eosinophils 1      % Basophils 0      % Immature Granulocytes 0      NRBCs per 100 WBC 0      Absolute Neutrophils 4.4      Absolute Lymphocytes 2.4      Absolute Monocytes 0.5      Absolute Eosinophils 0.1      Absolute Basophils 0.0      Absolute Immature Granulocytes 0.0      Absolute NRBCs 0.0           Interventions:  Medications   OLANZapine (zyPREXA) injection 10 mg ( Intramuscular Canceled Entry 1/27/23 0125)   LORazepam (ATIVAN) injection 2 mg (2 mg Intramuscular Given 1/27/23 0125)      Social Determinants of Health affecting care:  Hx amphetamine Abuse, patient currently agitated    Assessments:  0120 I obtained history and examined the patient as noted above. Patient placed on [facility] upon arrival.  0130 In person face to face assessment completed, including an evaluation of the patient's immediate reaction to the intervention, complete review of systems assessment, behavioral assessment and review/assessment of history, drugs and medications, recent labs, etc., and behavioral condition.The patient experienced: No adverse physical outcome from seclusion/restraint initiation.  0307 Nurse notifies me that the patient is out of restraints at this time.  0600 I rechecked the patient and she is sleeping at this time.     Disposition:  Care of the patient was transferred to my colleague Dr. Blank pending DEC Assessment.     Impression & Plan      Medical Decision Making:  Ms. Vásquez is a 52 year old female with history as above who presents acutely manic with pressured speech, delusional thought process, and paranoid ideations. She does have a history of stimulant abuse including methamphetamines and cocaine. This is likely multifactorial. It certainly could be drug-induced delirium though I suspect underlying medication noncompliance and manic exacerbations of her bipolar as being a significant contributor. She  was unable to be verbally deescalated. Restraints were placed and pharmacologic sedation was administered as per above. Plan of care will be observation to ensure her safety, metabolic and toxicologic rule out, and eventually DEC consultation to determined most appropriate disposition.     Diagnosis:    ICD-10-CM    1. Bipolar affective disorder, currently manic, severe, with psychotic features (H)  F31.2       2. Paranoia (H)  F22       3. Insomnia, unspecified type  G47.00            Scribe Disclosure:  Genesis JIMENEZ, am serving as a scribe at 1:30 AM on 1/27/2023 to document services personally performed by Santo So MD based on my observations and the provider's statements to me.     1/27/2023   Santo So MD Amdahl, John, MD  01/27/23 0621

## 2023-01-28 ENCOUNTER — TELEPHONE (OUTPATIENT)
Dept: BEHAVIORAL HEALTH | Facility: CLINIC | Age: 53
End: 2023-01-28
Payer: COMMERCIAL

## 2023-01-28 PROCEDURE — 250N000013 HC RX MED GY IP 250 OP 250 PS 637: Performed by: EMERGENCY MEDICINE

## 2023-01-28 RX ADMIN — BUSPIRONE HYDROCHLORIDE 10 MG: 10 TABLET ORAL at 08:00

## 2023-01-28 RX ADMIN — OLANZAPINE 10 MG: 5 TABLET, ORALLY DISINTEGRATING ORAL at 08:03

## 2023-01-28 RX ADMIN — OLANZAPINE 10 MG: 5 TABLET, ORALLY DISINTEGRATING ORAL at 21:07

## 2023-01-28 RX ADMIN — BUSPIRONE HYDROCHLORIDE 10 MG: 10 TABLET ORAL at 14:44

## 2023-01-28 RX ADMIN — BUSPIRONE HYDROCHLORIDE 10 MG: 10 TABLET ORAL at 21:06

## 2023-01-28 RX ADMIN — DIVALPROEX SODIUM 500 MG: 500 TABLET, DELAYED RELEASE ORAL at 08:00

## 2023-01-28 RX ADMIN — DIVALPROEX SODIUM 500 MG: 500 TABLET, DELAYED RELEASE ORAL at 21:07

## 2023-01-28 ASSESSMENT — ACTIVITIES OF DAILY LIVING (ADL)
ADLS_ACUITY_SCORE: 35

## 2023-01-28 NOTE — TELEPHONE ENCOUNTER
"S: [facility] ED , DEC  [place] calling at 6:47pm  about a 52 year old/Female presenting with altered mental status.        B: Pt arrived via EMS. Presenting problem, stressors: Police found pt on side of road in a broken down vehicle (out of gas). Pt was not able to offer history to officers and not able to answer any questions. Pt presented as paranoid, delusional, and agitated upon arrival. Pt denies SI, HI, and substance use. Pt endorses auditory and visual hallucinations but not able to specify detail. Pt states, \"I think something is wrong with my glasses or my eye balls and it is making me see things\". Pt is agitated and verbally escalated.    Pt affect in ED: Flat  Pt Dx: Bipolar Disorder, manic   Previous [facility] hx? Yes: November 2022.     Pt denies SI   Hx of suicide attempt? No  Pt denies SIB  Pt denies HI   Pt endorses auditory hallucinations  and endorses visual hallucination .     Hx of aggression/violence, sexual offences, legal concerns, or Epic care plan? describe: No.  Current concerns for aggression this visit? Yes: Verbal escalation and irritability.   Does pt have a history of Civil Commitment? Yes, most recent commitment with [facility]  Is Pt their own guardian? Yes    Pt is prescribed medication. Is patient medication compliant? Off meds since december  Pt endorses OP services: Psychiatrist and   CD concerns: None- Hx of amphetamines.  Acute or chronic medical concerns: No.   Does Pt present with specific needs, assistive devices, or exclusionary criteria? None      Pt is ambulatory  Pt is able to perform ADLs independently      A: Pt to be reviewed for [facility] admission. Pt is on a 72HH, initiated 1/27 @12pm   Preferred placement: [facility]    COVID:Negative  Utox: Ordered, not yet collected   CMP: WNL  CBC: WNL  HCG: Ordered, not yet collected    R: Patient cleared and ready for behavioral bed placement: Yes  Pt placed on [facility] worklist? Yes      "

## 2023-01-28 NOTE — ED NOTES
[facility] ED Behavioral Health Handoff Note:       Brief HPI:  This is a 52 year old female signed out to me by Dr. Zee.  See initial ED Provider note for details of the presentation.     Patient is medically cleared for admission to a Behavioral Health unit.      Hold Status:  Active Orders   Legal    Emergency Hospitalization Hold (72 Hr Hold)     Frequency: Effective Now     Start Date/Time: 01/27/23 1200      Number of Occurrences: Until Specified    Health Officer Authority to Detain (HEATHER)     Frequency: Effective Now     Start Date/Time: 01/27/23 0127      Number of Occurrences: Until Specified     Order Comments: This patient presented with circumstances that have led me to be reasonably suspicious that the patient is experiencing psychosis. The patient's judgment to this situation appears to be impaired. Given the circumstances in which the patient presented, it is likely that the patient is at significant risk of harming self or others if this situation is not investigated further. I am highly concerned that the patient is mentally ill and currently cannot safely care for oneself. This represents endangerment to the patient's well-being and safety, and I am placing a Health Officer Authority hold upon the patient at this time.         The patient has not required medication for agitation.    The patient's home medications have been reviewed and ordered/administered.    Exam:   Pulse:  [65-78] 65  Resp:  [14-18] 18  BP: (110-111)/(74-87) 110/74  SpO2:  [100 %] 100 %    Patient reassessed.  Appears calm and comfortable.    ED Course:    Medications   OLANZapine (zyPREXA) injection 10 mg ( Intramuscular Canceled Entry 1/27/23 0125)   acetaminophen (TYLENOL) tablet 650 mg (has no administration in time range)   OLANZapine zydis (zyPREXA) ODT tab 10 mg (10 mg Oral Given 1/28/23 0803)   LORazepam (ATIVAN) tablet 1 mg (has no administration in time range)   busPIRone (BUSPAR) tablet 10 mg (0 mg Oral  Hold 1/28/23 1114)   divalproex sodium delayed-release (DEPAKOTE) DR tablet 500 mg (500 mg Oral Given 1/28/23 0800)   hydrOXYzine (ATARAX) tablet 50 mg (has no administration in time range)   traZODone (DESYREL) tablet 50 mg (has no administration in time range)   LORazepam (ATIVAN) injection 2 mg (2 mg Intramuscular Given 1/27/23 0125)       There were no significant events while under my care.      Patient was signed out to the oncoming provider. Dr. Zee.      Impression:    ICD-10-CM    1. Bipolar affective disorder, currently manic, severe, with psychotic features (H)  F31.2       2. Paranoia (H)  F22       3. Insomnia, unspecified type  G47.00           Plan:    1. Await Transfer to Mental Health Facility      RESULTS:   No results found for this visit on 01/27/23 (from the past 24 hour(s)).          MD Alex Gu Tracy Dianne, MD  01/28/23 0095

## 2023-01-28 NOTE — ED NOTES
Attempted to contact brother Santo Torres [phone] from emergency contact on pt behalf. Pt on 72 hold. Pt concerned about vehicle due to it running out of gas PTA and would like to talk to brother. Writer unable to contact brother and no voice mail available to leave a message for call back.

## 2023-01-28 NOTE — ED NOTES
Patient requesting her phone. Belongings where checked by ERT and no phone was found. Patient was notified of this. Staff brought in phone so patient could call sister in law. Patient was unable to reach her. No further needs at this time.

## 2023-01-28 NOTE — TELEPHONE ENCOUNTER
R: The pt is currently in the [facility] ED awaiting placement. Patient is on a 72HH.     Intake afternoon bed search (Done at 3:44 PM) Facilities that have not updated their [place] MH access site in >12 hours have been contacted.      [facility] is posting 0 beds.   [facility] is posting 0 beds.  [facility] is posting 0 beds. 3:45 PM Intake called and spoke to Kelly, no beds available, facility at capacity.   [facility] is posting 0 beds.  [facility] is posting 0 beds.  [facility] is posting 0 beds.  [facility] is posting 0 beds.  [facility] is posting 1 bed. Low acuity. 3:51 PM Intake called [facility] and spoke to [facility] facilities at capacity.      [facility] is posting 0 bed. Mixed unit 12+. Low acuity only.   [facility] is posting 1 bed. No aggression. 3:51 PM Intake called [facility] and spoke to [facility] facilities at capacity.   [facility] is posting 0 beds.   [facility] is posting 0 beds. Low acuity only.  [facility] is posting 2 bed. Low acuity only. 3:51 PM Intake called [facility] and spoke to [facility] facilities at capacity.   [facility] is posting 0 beds. 0 acute, 0 med psych, 0 mood disorder- very low acuity.   [facility] is posting 0 beds. Low acuity only. 72 hr hold required.   [facility] is posting 9 beds. Low acuity. 4:31 PM Intake called and spoke to David, facility is capped and cannot accommodate patient.     [facility] is posting 1 bed. Vol only, No Hx of aggression, violence, or assault. No sexual offenders. No 72 hr holds. Patient is not appropriate for open bed: on a 72HH  [facility] is posting 7 beds. (Must have the cognitive ability to do programming. No aggressive or violent behavior or recent HX in the last 2 yrs. MH must be primary). Patient is not appropriate due to hx of restraints on 1/27  [facility]  [facility]. Low acuity only. Violence and aggression capped.   [facility] is posting 0 beds. Low acuity, Neg Covid.   [facility] is posting 0 beds. Covid neg. Vol only. Combined adolescent and adult unit. No aggressive or violent behavior. No registered sex offenders.   [facility] is posting 4 beds. No Covid needed. Call for details. Patient is not appropriate for open bed: on a 72HH and must be placed in [facility], facility in [place], [place].   [facility] is posting 0 beds. Mixed Unit (13+). Low acuity only.      [facility] at capacity. The pt remains on the waitlist. Intake continues to identify appropriate bed placement.

## 2023-01-28 NOTE — PLAN OF CARE
Christina Vásquez  January 27, 2023  Plan of Care Hand-off Note     Patient Care Path: Inpatient Mental Health    Plan for Care:     Pt is actively psychotic with a disorganized thought process, paranoia, and auditory/visual hallucinations. 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 Pt denies that she has used methamphetamine or any substance since being discharged from in patient unit on 11/21/22. Pt has not taken their medications since mid December and appears to have drastically decompensated. Pt will benefit from restarting medication regimen in a safe environment. Pt is on a 72 hour hold at this time. In patient bed has been requested. Pt denies SI, HI, NSSI.      Critical Safety Issues: Pt has history of agitation and verbal aggression.     Overview:  This patient is a child/adolescent: No    This patient has additional special visitor precautions: No    Legal Status: 72 HH starting on 01/27/23 11:59 AM.   Contacts:   Brother (Santo) - [phone]    Psychiatry Consult:  Psychiatry Consult not requested because pt has active medication regimen from previous hospitilization which was effective in clearing pt's psychosis    Updated Attending Provider regarding plan of care.    JOZEF Craft

## 2023-01-28 NOTE — CONSULTS
"Diagnostic Evaluation Consultation  Crisis Assessment    Patient was assessed: I-Pad  Patient location: [facility]   Was a release of information signed: No. Reason: pt too psychotic       Referral Data and Chief Complaint  Christina is a 52 year old, who uses she/her pronouns, and presents to the ED via EMS. Patient is referred to the ED by community provider(s). Patient is presenting to the ED for the following concerns: mental health concerns, delusions, paranoia.      Informed Consent and Assessment Methods     Patient is her own guardian. Writer met with patient and explained the crisis assessment process, including applicable information disclosures and limits to confidentiality, assessed understanding of the process, and obtained consent to proceed with the assessment. Patient was observed to be able to participate in the assessment as evidenced by responding to assessment questions. Assessment methods included conducting a formal interview with patient, review of medical records, collaboration with medical staff, and obtaining relevant collateral information from family and community providers when available..     Over the course of this crisis assessment provided reassurance, offered validation and engaged patient in problem solving and disposition planning. Patient's response to interventions was positive.      Summary of Patient Situation     Pt presents to ED via EMS after police found pt on side of road in a broken down vehicle (out of gas). Pt was not able to offer history to officers and not able to answer any questions. Pt presented as paranoid, delusional, and agitated upon arrival. Pt denies SI, HI, substance use, and NSSI. Pt endorses auditory and visual hallucinations but not able to specify detail. Pt states, \"I think something is wrong with my glasses or my eye balls and it is making me see things\". Pt has difficulty tracking and responding to questions throughout assessment. Pt makes several " "comments that are unrelated to questions prompted by . Pt denies recent substance use stating her last use was prior to in patient admission for 11 days on 11/10/22. Pt has not taken her medications since mid December stating she has difficulties getting refills. Pt is on commitment with [facility]. When discussing how pt arrived to ED, she stated she came her alone by choice. This is incorrect as pt was brought in by EMS after incident with police and being stranded in her vehicle. Pt was placed on a 72 hour hold. Collateral section of this note has further information regarding pt history from previous ED visit.     Brief Psychosocial History     Psychosocial and clinical history obtained from assessment completed on 11/5/22 as pt was non-responsive to questions relating to these topics.    Pt has a daughter and used to live in [facility] with her  until she started abusing Meth.  She lost her job and  and has been struggling more significantly since then. Using meth for about 3 years.   Per chart review, pt has hx of psychosis, schizophrenia, bipolar dx and drug abuse.   She does not currently see any providers.  She reports she has been taking her Lithium.  They ordered levels in the ED.      Pt had an admission in 5/2022 for a few days where it took around 5 days for her psychosis to clear.  Also admitted in 2021. Admitted 11/10/22 for 11 days for similar concerns/presentation. Pt is on commitment with [facility].        Collateral Information  Contacted brother of pt at approximately 6:45 pm but did not answer phone call. [phone] ([place])    Collateral from previous note is as follows...     Per last DEC assessment w/ helpful information:   Daughter reports pt is  and has a home in [place], and when she started doing Meth, she lost her job, drove to [place] when she was \"In a drug use moment\" and was hospitalized for 6 days. Daughter reports she has been driving too and from the " "cities from [place] and her   her due to her \"gambling, drug use, and mental health issues and her job loss\".     Concern about alcohol/drug use: Yes daughter reports pt has a problem with meth. Daughter reports pt has been using meth for 1.5-2 years and it started after pt mother .      What do you think the patient needs: Daughter reports she needs to her drug abuse under control and get mental health help. Daughter reports she believes pt self-medicates. Daughter reports she believes pt is a danger to herself and others.     Has patient made comments about wanting to kill themselves/others:  Yes daughter reports pt has made recent statements that she has, \"tried to hang herself in her brother's basement, but someone knocked on the door and she stopped. She said that she is glad to be living\". Daughter reports that pt will make suicidal comments one minute, and be glad to be alive for another, and feels she can't trust what pt says regarding suicidality.     If d/c is recommended, can they take part in safety/aftercare planning: Yes christaler reports she is able to emotionally support pt     Other information: Daughter reports pt is diagnosed with Bipolar disorder (diagnosed 20 years ago) and Schizophrenia (diagnosed 5 years ago). Daughter reports pt will lie about taking her medication, but, in fact, does not take her medication as prescribed. Daughter reports she went to a courthouse 2 months ago, in [place], and was given permission for an involuntary hold for placement in a mental health facility, [facility]. Daughter reports pt has been using meth for 1.5-2 years and it started after pt mother . Daughter reports that this is when pt use increased and mental health declined, and states that pt was the person that found her mother . Daughter reports she believes pt self-medicates. Daughter reports she believes pt is a danger to herself and others. Daughter " "reports pt has driven into random fields when high and has driven to and from [employer] and the [employer]. Daughter reports pt has made comments about the radio talking, things moving, and her  found letters pt wrote to herself, \"they were really mean, using vulgar language when writing to herself\". \"There's just been a huge change in every part of her\".     Risk Assessment  [place] Suicide Severity Rating Scale Full Clinical Version: No risk   Suicidal Ideation  1. Wish to be Dead (Lifetime): No  2. Non-Specific Active Suicidal Thoughts (Lifetime): No     Suicidal Behavior  Actual Attempt (Lifetime): No  Has subject engaged in non-suicidal self-injurious behavior? (Lifetime): No  Interrupted Attempts (Lifetime): No  Aborted or Self-Interrupted Attempt (Lifetime): No  Preparatory Acts or Behavior (Lifetime): No  C-SSRS Risk (Lifetime/Recent)  Calculated C-SSRS Risk Score (Lifetime/Recent): No Risk Indicated    [place] Suicide Severity Rating Scale Since Last Contact:                 Validity of evaluation is impacted by presenting factors during interview. Pt appears actively psychotic and has difficulty answering assessment questions thoroughly.    Comments regarding subjective versus objective responses to [place] tool: n/a  Environmental or Psychosocial Events: legal issues such as DWI, DUI, lawsuit, CPS involvement, etc., loss of relationship due to divorce/separation, other life stressors and ongoing abuse of substances  Chronic Risk Factors: history of psychiatric hospitalization, chronic and ongoing sleep difficulties and serious, persistent mental illness   Warning Signs: rage, anger, seeking revenge and anxiety, agitation, unable to sleep, sleeping all the time  Protective Factors: strong bond to family unit, community support, or employment, responsibilities and duties to others, including pets and children and lives in a responsibly safe and stable environment  Interpretation of Risk " Scoring, Risk Mitigation Interventions and Safety Plan:  No risk is not valid as pt is psychotic and had difficulties answering many columbia questions. Per previous ESS-6 assessment completed on 11/05/22, pt endorses suicidal thoughts but denies any history of attempts, plans, or intent. Pt does not have many risk mitigation interventions in place currently and is too psychotic for safety planning.        Does the patient have thoughts of harming others? No     Is the patient engaging in sexually inappropriate behavior?  no        Current Substance Abuse     Is there recent substance abuse? History of methamphetamine abuse. Unclear if pt is actively using.      Was a urine drug screen or blood alcohol level obtained: Yes not collected       Mental Status Exam     Affect: Flat   Appearance: Disheveled    Attention Span/Concentration: Inattentive  Eye Contact: Avoidant   Fund of Knowledge: Delayed    Language /Speech Content: Fluent   Language /Speech Volume: Soft    Language /Speech Rate/Productions: Minimally Responsive    Recent Memory: Poor   Remote Memory: Poor   Mood: Irritable    Orientation to Person: Yes    Orientation to Place: Yes   Orientation to Time of Day: Yes    Orientation to Date: Yes    Situation (Do they understand why they are here?): Answer: Pt does not feel they should be here and she does not recall how she arrived     Psychomotor Behavior: Underactive    Thought Content: Delusions and Hallucinations   Thought Form: Loose Associations      History of commitment: Yes Currently on [facility] commitment          Medication    Psychotropic medications: Yes. Pt is currently taking dd. Medication compliant: No: Pt has not taken medications since December . Recent medication changes: No  Medication changes made in the last two weeks: No       Current Care Team    Primary Care Provider: No  Psychiatrist: Pt states she has a psychiatrist but name unknown  Therapist: No  : Pt's previous case  manager is Roseline Blank. Unclear if pt still works with this individual. Pt has a court appointed  for [facility] commitment, name unknown and not in [facility].      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 Pt denies that she has used methamphetamine or any substance since being discharged from in patient unit on 11/21/22. Pt has not taken their medications since mid December and appears to have drastically decompensated. Pt will benefit from restarting medication regimen in a safe environment. Pt is on a 72 hour hold at this time. In patient bed has been requested. Pt denies SI, HI, NSSI.    Admission to Inpatient Level of Care is indicated due to:    1. Patient risk of severity of behavioral health disorder is appropriate to proposed level of care as indicated by:    Imminent Risk of Harm:   And/or:  Behavioral health disorder is present and appropriate for inpatient care with both of the following:     Severe psychiatric, behavioral or other comorbid conditions are appropriate for management at inpatient mental health as indicated by at least one of the following:   o Hallucinations; delusions; positive symptoms, Comorbid substance use disorder, Impaired impulse control, judgement, or insight and Evidence of severely diminished ability to assess consequences of own actions    Severe dysfunction in daily living is present as indicated by at least one of the following:   o Other evidence of severe dysfunction    2. Inpatient mental  "health services are necessary to meet patient needs and at least one of the following:  Specific condition related to admission diagnosis is present and judged likely to further improve at proposed level of care    3. Situation and expectations are appropriate for inpatient care, as indicated by one of the following:   Patient is unwilling to participate in treatment voluntarily and requires treatment    Disposition    Recommended disposition: Inpatient Mental Health       Reviewed case and recommendations with attending provider. Attending Name: Dr. Blank       Attending concurs with disposition: Yes       Patient concurs with disposition: No: Pt believes she should return home with intentions of \"getting her car\"        Guardian concurs with disposition: NA      Final disposition: Inpatient mental health .     Inpatient Details (if applicable):   Is patient admitted voluntarily:No, 72 hr hold. Hold start date/time: 1/27/23      Patient aware of potential for transfer if there is not appropriate placement? NA       Patient is willing to travel outside of the [facility] for placement? NA      Behavioral Intake Notified? Yes: Date: 1/27/23 Time: 6:45 pm.         Assessment Details    Patient interview started at: 5:30 and completed at: 6:15 pm.     Total duration spent on the patient case in minutes: .75 hrs      CPT code(s) utilized: 44438 - Psychotherapy for Crisis - 60 (30-74*) min       JOZEF Craft, Psychotherapist Trainee, Psychotherapist  DEC - Triage & Transition Services  Callback: [phone]              "

## 2023-01-28 NOTE — TELEPHONE ENCOUNTER
No appropriate beds are currently available within the  system. Bed search update (statewide) @ 1AM and 03:15:        [facility]: @ cap per website. Per Kris @ 00:15 they do not have beds available  [facility]: Per Tanza @ 00:15 they are reviewing to their capacity but suggests calling back in a few hours.   [facility]: @ cap per website. Per Giovanny @ 00:21 they do not have beds but suggests calling back after 08:30  [facility]: @ cap per website  [facility]: @ cap per website  [facility]: @ cap per website  [facility]: @ cap per website  [facility]: @ cap per website  [facility]: @ cap per website  [facility]: Per Yobani @ 00:22, [place] and [facility] do not have beds and [facility] only has LOW acuity beds available - pt not appropriate for current beds avail  [facility]: @ cap per website  [facility]: Posting 8 beds. (Low acuity only. Must have the cognitive ability to do programming. No aggressive or violent behavior or recent HX in the last 2 yrs. MH must be primary.) Pt not appropriate for facility  [facility]: @ cap per website  [facility]. Per Jeni @ 00:53, they are on [facility]: @ cap per website. (Covid neg. Mixed unit. No aggressive or violent behavior. No registered sex offenders. Voluntary only). Meets exclusionary criteria d/t involuntary status   [facility]: @ cap per website    Called [facility] again @ 03:17 - Yanira reports they are at capacity and won't have an update until 10AM        Pt remains on work list until appropriate placement is available

## 2023-01-28 NOTE — TELEPHONE ENCOUNTER
[facility] Access Inpatient Bed Call Log 1/28/2023 @ 8:40 AM   Facilities that have not updated websites in the last 12 hours have been called.    Adults:     [facility] is posting 0 beds.  Negative covid.        [facility] is posting 0 beds. Negative covid.     [facility] has 0 beds. 72HH preferred     [facility] is posting 0 beds.      [facility] are posting 0 beds.       [facility] is posting 0 beds. Negative covid.      [facility] has 0 beds.       [facility] is posting 0 beds. Negative covid. Low acuity.         [facility] is posting 0 beds.     [facility] has 0 beds. No aggression.  Negative covid.     [facility] is posting 0 beds.  Negative covid.      [facility] is posting 1 bed. Low acuity only.  Negative covid. Pt not appropriate    [facility] is posting 3 beds. Low acuity only. Pt not appropriate    [facility] has 0 beds. Low acuity. Negative covid.      [facility] is posting 0bed. 72 HH hold preferred. Low acuity only.      [facility] is posting 9 beds. Low acuity. Negative covid. Pt not appropriate    [facility] is posting 0 beds. Negative covid. Vol only, No history of aggression, violence, or assault. No sexual offenders. No 72 HH holds.         [facility] is posting 7 beds. Always low acuity. Pt not appropriate    [facility] has 0 beds. Negative Covid. Low acuity only. Violence and aggression capped.      [facility] is posting 0 beds. Low acuity, Negative Covid.      [facility] is posting 0 beds. Covid Negative. Vol only. Combined adolescent and adult unit. No aggressive or violent behavior. No registered sex offenders.      [facility] posting 0 beds. Negative covid.    [facility] 0  beds per Kellie.    Pt remains on waitlist pending bed availability

## 2023-01-28 NOTE — ED NOTES
Pt requesting to call brother and requesting phone numbers for hotel. Pt notified we are not going to get a phone for her at this time. Pt fell asleep shortly afterwards.

## 2023-01-29 ENCOUNTER — TELEPHONE (OUTPATIENT)
Dept: BEHAVIORAL HEALTH | Facility: CLINIC | Age: 53
End: 2023-01-29
Payer: COMMERCIAL

## 2023-01-29 PROCEDURE — 250N000013 HC RX MED GY IP 250 OP 250 PS 637: Performed by: EMERGENCY MEDICINE

## 2023-01-29 RX ADMIN — OLANZAPINE 10 MG: 5 TABLET, ORALLY DISINTEGRATING ORAL at 20:37

## 2023-01-29 RX ADMIN — BUSPIRONE HYDROCHLORIDE 10 MG: 10 TABLET ORAL at 08:29

## 2023-01-29 RX ADMIN — BUSPIRONE HYDROCHLORIDE 10 MG: 10 TABLET ORAL at 14:54

## 2023-01-29 RX ADMIN — DIVALPROEX SODIUM 500 MG: 500 TABLET, DELAYED RELEASE ORAL at 08:42

## 2023-01-29 RX ADMIN — DIVALPROEX SODIUM 500 MG: 500 TABLET, DELAYED RELEASE ORAL at 20:37

## 2023-01-29 RX ADMIN — BUSPIRONE HYDROCHLORIDE 10 MG: 10 TABLET ORAL at 20:37

## 2023-01-29 RX ADMIN — OLANZAPINE 10 MG: 5 TABLET, ORALLY DISINTEGRATING ORAL at 08:29

## 2023-01-29 ASSESSMENT — ACTIVITIES OF DAILY LIVING (ADL)
ADLS_ACUITY_SCORE: 35

## 2023-01-29 NOTE — ED NOTES
RN was able to get vitals and administer morning meds. Rn attempted to get urine sample and patient refused. Will try again at later time.

## 2023-01-29 NOTE — TELEPHONE ENCOUNTER
No appropriate beds are currently available within the  system. Bed search update @ 12AM:       [facility]: @ cap per website  [facility]: @ cap per website  [facility]: @ cap per website. Per Jacqui @ 00:01, they only have low acuity beds available and cannot review until this AM when their  is in   [facility]: @ cap per website  [facility]: @ cap per website  [facility]: @ cap per website  [facility]: @ cap per website  [facility]: @ cap per website  [facility]: @ cap per website  [facility]: @ cap per website  [facility]: @ cap per website  [facility]: @ cap per website  [facility]: @ cap per website  [facility]: @ cap per website  [facility]: @ cap per website  [facility]: Posting 2 beds. Per Taylor @ 00:07 they are reviewing for their last high (female) acuity bed.   [facility]: Posting 1 bed. No hx of aggression. Voluntary admissions only. Meets exclusionary d/t involuntary status  [facility]: Posting 7 beds. Low acuity only. Must have the cognitive ability to do programming. No aggressive or violent behavior or recent HX in the last 2 yrs. MH must be primary. Pt not appropriate  [facility]: @ cap per website   [facility]: @ cap per website. Low acuity, Neg Covid.  [facility]: @ cap per website. (Covid neg. Voluntary only. Mixed unit. No aggressive or violent behavior. No registered sex offenders.) Meets exclusionary d/t involuntary status  [facility]: @ cap per website. Mixed unit.        Pt remains on work list until appropriate placement is available

## 2023-01-29 NOTE — ED NOTES
Triage & Transition Services, Extended Care     Christina Vásquez  January 29, 2023    Christina is followed related to Long wait time for admission: 60+ hours. Please see initial DEC Crisis Assessment for complete assessment information. Medical record is reviewed. While patient is in the ED, care team is working towards Learn and Demonstrate at [employer] on Crisis Stabilization.     Additional notes include:     11:30AM Per  Intake most updated note, no IP MH placement yet available - search continues.     3:30PM Called [facility] Crisis [phone] to ask who is patient's assigned commitment . Worker provided info: Vero De Leon [phone].     3:34PM Called and left VM for Vero De Leon [phone] requesting a call back to [phone] regarding patient and possibly requesting revocation of provisional discharge.     There are not significant status changes. Patient is on 72HH through 1200 on Wed, 2/1, while EC attempts to contact patient's [facility] commitment  regarding revocation of provisional discharge.     Recommend ED care team to continue care coordination with  Intake [phone] regarding IP MH placement.      Plan:  Inpatient Mental Health: Continue to recommend IP MH admission for patient safety and stabilization due to acute psychotic symptoms with lack of insight rendering patient unable to safely care for self. Patient is currently on [facility] commitment through [facility] with court order to take prescribed neuroleptic medications. EC team to contact patient's assigned [facility] , Veroginny De Leon [phone], tomorrow (Monday) regarding revocation of provisional discharge.     Extended Care will follow and meet with patient/family/care team as able or requested.     KAYCE POLO M.Ed., [facility], [facility]  Licensed Mental Health Professional  Triage and Transition Services - Extended Care [phone]    [facility]  [phone]  [facility] [phone]

## 2023-01-29 NOTE — ED NOTES
Pt requesting to update brother on condition. RN attempted to contact brother Santo Torres @ [phone]. RN unable to contact brother and no answering machine was available. Pt sitting on bed, calm and cooporative.

## 2023-01-29 NOTE — ED NOTES
"Patient cooperative with RN during evening assessment, took medications and allowed VS to be taken at this time. Patient asks about discharge plan, This RN reports she does not know the plan at this time. Patient reports \"I only wanted help with my car from the Police and this is where I ended up...this is bullshit, I am not going to another hospital\" This RN redirected towards task of VS, patient cooperative. No other complaints or requests at this time.  "

## 2023-01-30 PROCEDURE — 250N000013 HC RX MED GY IP 250 OP 250 PS 637: Performed by: EMERGENCY MEDICINE

## 2023-01-30 RX ADMIN — DIVALPROEX SODIUM 500 MG: 500 TABLET, DELAYED RELEASE ORAL at 08:35

## 2023-01-30 RX ADMIN — OLANZAPINE 10 MG: 5 TABLET, ORALLY DISINTEGRATING ORAL at 08:32

## 2023-01-30 RX ADMIN — OLANZAPINE 10 MG: 5 TABLET, ORALLY DISINTEGRATING ORAL at 21:32

## 2023-01-30 RX ADMIN — BUSPIRONE HYDROCHLORIDE 10 MG: 10 TABLET ORAL at 08:32

## 2023-01-30 RX ADMIN — DIVALPROEX SODIUM 500 MG: 500 TABLET, DELAYED RELEASE ORAL at 21:32

## 2023-01-30 RX ADMIN — BUSPIRONE HYDROCHLORIDE 10 MG: 10 TABLET ORAL at 21:32

## 2023-01-30 RX ADMIN — BUSPIRONE HYDROCHLORIDE 10 MG: 10 TABLET ORAL at 14:06

## 2023-01-30 ASSESSMENT — ACTIVITIES OF DAILY LIVING (ADL)
ADLS_ACUITY_SCORE: 35

## 2023-01-30 NOTE — TELEPHONE ENCOUNTER
No appropriate beds are currently available within the  system. Bed search update (statewide) @ 12AM:        [facility]: @ cap per website. Per Inge @ 00:14, they do not have beds available  [facility]: @ cap per website  [facility]: @ cap per website. Per Monie @ 00:15 they do not have any tonight but possible in the morning  [facility]: @ cap per website  [facility]: @ cap per website  [facility]: @ cap per website  [facility]: @ cap per website  [facility]: @ cap per website  [facility]: @ cap per website. Mixed unit/Low acuity only.  [facility]: @ cap per website  [facility]: @ cap per website  [facility]: @ cap per website  [facility]: @ cap per website. Does not review after 10PM.    [facility]: @ cap per website  [facility]: @ cap per website  [facility]: Posting 1 bed. Per Danna @ 00:16, low acuity beds at [place]. Pt not appropriate for current beds   [facility]: Posting 1 bed. (No hx of aggression. Voluntary admissions only). Meets exclusionary criteria d/t involuntary status   [facility]: Posting 8 beds. Low acuity only. Must have the cognitive ability to do programming. No aggressive or violent behavior or recent HX in the last 2 yrs. MH must be primary). Pt not appropriate for facility  [facility]: @ cap per website  [facility]: @ [facility] per [facility]. Low acuity, Neg Covid.  [facility]: @ cap per website. Covid neg. Voluntary only. Mixed unit. No aggressive or violent behavior. No registered sex offenders. Meets exclusionary criteria d/t involuntary status   [facility]: @ cap per website.        Pt remains on work list until appropriate placement is available

## 2023-01-30 NOTE — ED PROVIDER NOTES
Care signed out to me by Dr. Villagomez.  No acute issues during my shift.  Continues to await available inpatient mental health bed.  Care signed out to my partner Dr. Bailon pending available inpatient mental health bed.      ICD-10-CM    1. Bipolar affective disorder, currently manic, severe, with psychotic features (H)  F31.2       2. Paranoia (H)  F22       3. Insomnia, unspecified type  G47.00              Maco Lambert MD  01/30/23 9001

## 2023-01-30 NOTE — ED NOTES
Triage & Transition Services, Extended Care     Christina Vásquez  January 30, 2023    Christina is followed related to Long wait time for admission: 82+ hours. Please see initial DEC Crisis Assessment for complete assessment information. Medical record is reviewed. While patient is in the ED, care team is working towards Learn and Demonstrate at [facility] [employer] [facility] on Crisis Stabilization.      Additional notes include:      11:30AM Per  Intake most updated note, no IP MH placement yet available - search continues.      11:50AM Called and spoke with Vero De Leon [phone], patient's [facility] commitment , who reported that the last contact she had with patient was 12/6/22.  stated that her plan with her supervisor as of Monday of last week was to request a revocation of provisional discharge if unable to contact patient by last Friday.  stated that she will submit request for revocation today and will fax and/or email signed order when she receives it from the court. This writer provided fax numbers for Erika MOBLEY and EC. This writer also sent email to [email or web address] with EC departmental email address.     12:02PM Requested via Teams chat for EC coordinators to watch for revocation of provisional discharge order from  and to alert this writer and/or JOZEF Rendon, when it arrives. Requested also for coordinators to fax document to STAT and  Intake when it arrives. Coordinator created Task.     12:12PM Called  Intake, provided update that  is submitting request to court for revocation of provisional discharge and that EC will notify  Intake when signed order is received. Coordinator provided update that no IP MH placement yet available - search continues.      There are not significant status changes. Patient is on 72HH through 1200 on Wed, 2/1, while EC awaits signed order for revocation of provisional  discharge.  from .      Recommend ED care team to continue care coordination with [facility] [phone] regarding IP MH placement and patient's [facility] MICD commitment  Vero De Leon [phone].       Plan:  Inpatient Mental Health: Continue to recommend IP MH admission for patient safety and stabilization due to acute psychotic symptoms with lack of insight rendering patient unable to safely care for self. Patient is currently on MICD commitment through [facility] with court order to take prescribed neuroleptic medications. EC team to follow up with patient's [facility] , Vero De Leon [phone], and/or [facility] [address] [facility] [phone] for a signed order for revocation of provisional discharge. Patient to be served with court order when ED care team receives document from court.      Extended Care will follow and meet with patient/family/care team as able or requested.      KAYCE POLO M.Ed., [facility], [facility]  Licensed Mental Health Professional  Triage and Transition Services - Extended Care [phone]     [facility] workstation [phone]  [facility] [employer] [phone]

## 2023-01-30 NOTE — ED NOTES
Pt runs to the bathroom and shuts the door refusing to give a urine specimen, writer will keep trying to get one.

## 2023-01-31 ENCOUNTER — TELEPHONE (OUTPATIENT)
Dept: BEHAVIORAL HEALTH | Facility: CLINIC | Age: 53
End: 2023-01-31
Payer: COMMERCIAL

## 2023-01-31 ENCOUNTER — HOSPITAL ENCOUNTER (INPATIENT)
Facility: CLINIC | Age: 53
LOS: 15 days | Discharge: HOME | End: 2023-02-15
Attending: PSYCHIATRY & NEUROLOGY | Admitting: PSYCHIATRY & NEUROLOGY
Payer: COMMERCIAL

## 2023-01-31 VITALS
OXYGEN SATURATION: 99 % | HEART RATE: 68 BPM | RESPIRATION RATE: 16 BRPM | DIASTOLIC BLOOD PRESSURE: 79 MMHG | TEMPERATURE: 97.9 F | SYSTOLIC BLOOD PRESSURE: 101 MMHG

## 2023-01-31 DIAGNOSIS — F31.2 BIPOLAR AFFECTIVE DISORDER, CURRENTLY MANIC, SEVERE, WITH PSYCHOTIC FEATURES (H): ICD-10-CM

## 2023-01-31 DIAGNOSIS — F17.200 NICOTINE USE DISORDER: Primary | ICD-10-CM

## 2023-01-31 DIAGNOSIS — F41.9 ANXIETY: ICD-10-CM

## 2023-01-31 LAB
AMPHETAMINES UR QL SCN: NORMAL
BARBITURATES UR QL: NORMAL
BENZODIAZ UR QL: NORMAL
CANNABINOIDS UR QL SCN: NORMAL
COCAINE UR QL: NORMAL
ETHANOL UR QL SCN: NORMAL
HCG UR QL: NEGATIVE
OPIATES UR QL SCN: NORMAL

## 2023-01-31 PROCEDURE — 250N000013 HC RX MED GY IP 250 OP 250 PS 637: Performed by: EMERGENCY MEDICINE

## 2023-01-31 PROCEDURE — 80307 DRUG TEST PRSMV CHEM ANLYZR: CPT | Performed by: PSYCHIATRY & NEUROLOGY

## 2023-01-31 PROCEDURE — 250N000013 HC RX MED GY IP 250 OP 250 PS 637: Performed by: PSYCHIATRY & NEUROLOGY

## 2023-01-31 PROCEDURE — 81025 URINE PREGNANCY TEST: CPT | Performed by: PSYCHIATRY & NEUROLOGY

## 2023-01-31 PROCEDURE — 124N000002 HC R&B MH UMMC

## 2023-01-31 RX ORDER — HYDROXYZINE HYDROCHLORIDE 50 MG/1
50 TABLET, FILM COATED ORAL EVERY 4 HOURS PRN
Status: DISCONTINUED | OUTPATIENT
Start: 2023-01-31 | End: 2023-02-15 | Stop reason: HOSPADM

## 2023-01-31 RX ORDER — LANOLIN ALCOHOL/MO/W.PET/CERES
3 CREAM (GRAM) TOPICAL
Status: DISCONTINUED | OUTPATIENT
Start: 2023-01-31 | End: 2023-02-06

## 2023-01-31 RX ORDER — OLANZAPINE 10 MG/1
10 TABLET ORAL DAILY PRN
Status: DISCONTINUED | OUTPATIENT
Start: 2023-01-31 | End: 2023-02-15 | Stop reason: HOSPADM

## 2023-01-31 RX ORDER — OLANZAPINE 10 MG/2ML
10 INJECTION, POWDER, FOR SOLUTION INTRAMUSCULAR DAILY PRN
Status: DISCONTINUED | OUTPATIENT
Start: 2023-01-31 | End: 2023-02-15 | Stop reason: HOSPADM

## 2023-01-31 RX ORDER — POLYETHYLENE GLYCOL 3350 17 G
4 POWDER IN PACKET (EA) ORAL
Status: DISCONTINUED | OUTPATIENT
Start: 2023-01-31 | End: 2023-02-15 | Stop reason: HOSPADM

## 2023-01-31 RX ORDER — DIVALPROEX SODIUM 500 MG/1
500 TABLET, DELAYED RELEASE ORAL EVERY 12 HOURS
Status: DISCONTINUED | OUTPATIENT
Start: 2023-01-31 | End: 2023-02-15 | Stop reason: HOSPADM

## 2023-01-31 RX ORDER — BUSPIRONE HYDROCHLORIDE 10 MG/1
10 TABLET ORAL 3 TIMES DAILY
Status: DISCONTINUED | OUTPATIENT
Start: 2023-01-31 | End: 2023-02-15 | Stop reason: HOSPADM

## 2023-01-31 RX ORDER — AMOXICILLIN 250 MG
1 CAPSULE ORAL 2 TIMES DAILY PRN
Status: DISCONTINUED | OUTPATIENT
Start: 2023-01-31 | End: 2023-02-15 | Stop reason: HOSPADM

## 2023-01-31 RX ORDER — MAGNESIUM HYDROXIDE/ALUMINUM HYDROXICE/SIMETHICONE 120; 1200; 1200 MG/30ML; MG/30ML; MG/30ML
30 SUSPENSION ORAL EVERY 4 HOURS PRN
Status: DISCONTINUED | OUTPATIENT
Start: 2023-01-31 | End: 2023-02-15 | Stop reason: HOSPADM

## 2023-01-31 RX ORDER — ACETAMINOPHEN 325 MG/1
650 TABLET ORAL EVERY 4 HOURS PRN
Status: DISCONTINUED | OUTPATIENT
Start: 2023-01-31 | End: 2023-02-15 | Stop reason: HOSPADM

## 2023-01-31 RX ORDER — OLANZAPINE 10 MG/1
10 TABLET ORAL 2 TIMES DAILY
Status: DISCONTINUED | OUTPATIENT
Start: 2023-01-31 | End: 2023-02-01

## 2023-01-31 RX ORDER — OLANZAPINE 10 MG/1
10 TABLET, ORALLY DISINTEGRATING ORAL 2 TIMES DAILY
Status: CANCELLED | OUTPATIENT
Start: 2023-01-31

## 2023-01-31 RX ORDER — NICOTINE 21 MG/24HR
1 PATCH, TRANSDERMAL 24 HOURS TRANSDERMAL DAILY
Status: DISCONTINUED | OUTPATIENT
Start: 2023-01-31 | End: 2023-02-15 | Stop reason: HOSPADM

## 2023-01-31 RX ORDER — TRAZODONE HYDROCHLORIDE 50 MG/1
50 TABLET, FILM COATED ORAL
Status: DISCONTINUED | OUTPATIENT
Start: 2023-01-31 | End: 2023-02-06

## 2023-01-31 RX ORDER — OLANZAPINE 10 MG/2ML
10 INJECTION, POWDER, FOR SOLUTION INTRAMUSCULAR 2 TIMES DAILY
Status: CANCELLED | OUTPATIENT
Start: 2023-01-31

## 2023-01-31 RX ADMIN — NICOTINE POLACRILEX 4 MG: 2 LOZENGE ORAL at 17:48

## 2023-01-31 RX ADMIN — BUSPIRONE HYDROCHLORIDE 10 MG: 10 TABLET ORAL at 16:42

## 2023-01-31 RX ADMIN — OLANZAPINE 10 MG: 10 TABLET, FILM COATED ORAL at 20:28

## 2023-01-31 RX ADMIN — HYDROXYZINE HYDROCHLORIDE 50 MG: 50 TABLET, FILM COATED ORAL at 16:42

## 2023-01-31 RX ADMIN — DIVALPROEX SODIUM 500 MG: 500 TABLET, DELAYED RELEASE ORAL at 20:28

## 2023-01-31 RX ADMIN — OLANZAPINE 10 MG: 5 TABLET, ORALLY DISINTEGRATING ORAL at 08:35

## 2023-01-31 RX ADMIN — DIVALPROEX SODIUM 500 MG: 500 TABLET, DELAYED RELEASE ORAL at 08:33

## 2023-01-31 RX ADMIN — BUSPIRONE HYDROCHLORIDE 10 MG: 10 TABLET ORAL at 20:28

## 2023-01-31 RX ADMIN — BUSPIRONE HYDROCHLORIDE 10 MG: 10 TABLET ORAL at 08:32

## 2023-01-31 ASSESSMENT — ACTIVITIES OF DAILY LIVING (ADL)
ADLS_ACUITY_SCORE: 35
ADLS_ACUITY_SCORE: 35
ORAL_HYGIENE: INDEPENDENT
ADLS_ACUITY_SCORE: 35
ADLS_ACUITY_SCORE: 35
ADLS_ACUITY_SCORE: 28
ADLS_ACUITY_SCORE: 28
ADLS_ACUITY_SCORE: 35
HYGIENE/GROOMING: INDEPENDENT
ADLS_ACUITY_SCORE: 35
DRESS: SCRUBS (BEHAVIORAL HEALTH)
ADLS_ACUITY_SCORE: 28
ADLS_ACUITY_SCORE: 28

## 2023-01-31 ASSESSMENT — LIFESTYLE VARIABLES
AUDIT-C TOTAL SCORE: 0
SKIP TO QUESTIONS 9-10: 1

## 2023-01-31 NOTE — PLAN OF CARE
ON ARRIVAL:    Sweatshirt, leggings, tshirt, tank top, car key, tennis shoes, lighter, broken glasses

## 2023-01-31 NOTE — ED NOTES
"Triage & Transition Services, Extended Care     Christina Vásquez  January 31, 2023    Christina is followed related to Long wait time for admission: 106+ hours . Please see initial DEC Crisis Assessment completed for complete assessment information. Medical record is reviewed. While patient is in the ED, care team is working towards Learn and Demonstrate at [employer] Focused on Crisis Stabilization.     There are not significant status changes.     10:55am- writer entered patient's room, introduced self and explained role.  Patient was lying down on the gurney with lights dimmed, she was agreeable in talking with writer and sat up, she is awake and alert.  Reviewed plan of care with patient and spoke about recommendation for IP MH admission, writer informed patient of revocation of provisional discharge and presented documents to the patient for review.  Patient asked questions regarding revocation, she expressed that she did not agree with the plan, she stated concerns feeling like she would be \"stuck in the hospital\" for a long time.  Processed thoughts/feeling related to this.  Patient also spoke about concerns regarding her car, she believes it was towed but doesn't have information on where/how to find it.  She also stated she has been trying to reach her brother, but disappointed that she hasn't been able to speak with him.  Writer asked patient about why she has been declining to give urine specimen, she explained \"everytime I do that you guys hold it against me\", provided validation and also explained to the patient how that could effect the bed search process.  Patient noted [place] [facility] [place], she said she will not have transportation upon discharge and doesn't want [facility] a [facility].  She notes she is currently living in [place] at the Extended Stay receiving services through a homeless shelter.  Patient states she has been " sleeping a lot to pass the time, she presents as calm and cooperative.      Writer provided patient with phone numbers for her brother Santo ([phone]), Extended Stay in [place] to check on her belongings ([phone]), and Venetie IRA Dieudonne to get information in her car she believes was towed ([phone]).      12:40pm- faxed signed order for revocation of provisional discharge to Central Intake, placed paper copy in patient's chart    Recommend  to continue care coordination with Central Intake [phone] regarding IP MH placement and patient's [facility] MICD commitment  Vero Haley [phone].       Plan:  Inpatient Mental Health: Continue to recommend IP MH admission for patient safety and stabilization due to acute psychotic symptoms with lack of insight rendering patient unable to safely care for self. Patient is currently on MICD commitment through [facility] with court order to take prescribed neuroleptic medications.  Signed order for revocation of provisional discharge was received and document presented to patient.  72HH is now a court hold.      Plan for Care reviewed with Assigned Medical Provider? Yes. Provider, Alycia RN, response: verbalizes understanding of plan    Extended Care will follow and meet with patient/family/care team as able or requested.     Maria Teresa Garber, [facility], Extended Care   [phone]

## 2023-01-31 NOTE — ED NOTES
Report called to [facility] station 10 talked with BLAYNE Informed patient she was being transferred became upside that she was going to a hospital in [facility]. Patient did agree togo but not happy.

## 2023-01-31 NOTE — TELEPHONE ENCOUNTER
R:  Patient cleared and ready for behavioral bed placement: Yes     Provider paged @ 12:50pm to present for unit 10/Kevin Nieves called intake back @ 1:30pm and accepted pt for until 10.   Pt placed in units queue @ 1:38pm and charge called with disposition.  Message left for Charge to call back.  Intake called 10 @ 200pm and gave charge disposition.   Charge will call [facility] in 1/2 for report.    Intake called [facility] with placement @ 2:15pm   Report in 1/2 hour

## 2023-01-31 NOTE — PLAN OF CARE
01/31/23 1611   Patient Belongings   Patient Belongings Put in Hospital Secure Location (Security or Locker, etc.) clothing;keys;glasses   Belongings Search Yes   Clothing Search Yes   Second Staff Manisha MOTLEY and Julita BREAUX     LOCKER:    Sweatshirt, leggings, tshirt, tank top, car key, tennis shoes, lighter, broken glasses      A               Admission:  I am responsible for any personal items that are not sent to the safe or pharmacy.  [facility] is not responsible for loss, theft or damage of any property in my possession.    Signature:  _________________________________ Date: _______  Time: _____                                              Staff Signature:  ____________________________ Date: ________  Time: _____      2nd Staff person, if patient is unable/unwilling to sign:    Signature: ________________________________ Date: ________  Time: _____     Discharge:  Ludmila has returned all of my personal belongings:    Signature: _________________________________ Date: ________  Time: _____                                          Staff Signature:  ____________________________ Date: ________  Time: _____

## 2023-01-31 NOTE — PHARMACY-ADMISSION MEDICATION HISTORY
Please see Admission Medication History note completed on 1/27 under previous encounter for information regarding prior to admission medications.    Kami Horn, PharmD   *[id number]

## 2023-01-31 NOTE — ED NOTES
"Sleeping on and off up to bathroom gave patient urine cup to collect urine and patient refused. \" Every time I give a urine sample it is used against me\" Informed patient the need to the sample and why states next time he give the sample to the nurse. DEC told patient that she is being committed and gave her paperwork.  "

## 2023-01-31 NOTE — ED PROVIDER NOTES
Signed out at shift change.  In brief has been in the ED on prolonged boarding related to decompensated psychosis with a history of bipolar disorder off of medications.  Extended care mental health team continues to follow care.      No acute needs on the night shift.  Nursing noted that at 2:21am patient ran to the bathroom but refused to provide a urine sample.           Monie Ware MD  01/31/23 3873

## 2023-01-31 NOTE — TELEPHONE ENCOUNTER
R:  No appropriate beds within [place].  Bed search update 7:30PM    [facility]: @ cap per website: Per Giovana, no beds  [facility]: @ cap per website   [facility]: @ cap per website: Per Joy, no beds  [facility]: @ cap per website  [facility]: @ cap per website: Per Alba, no beds  [facility]: @ cap per website.    [facility]: @ cap per website  [facility]:  @ cap per website  [facility]: @ cap per website.  Mixed unit with children.  Per Radha no beds  [facility]: @ cap per website  [facility]: @ cap per website  [facility]: @ cap per website  [facility]: 2 LOW ACUITY beds posted.  Not appropriate due to restraints in the ED  [facility]: @ cap per website.    [facility]: @ cap per website.    [facility]: 2 LOW ACUITY beds.  Not appropriate  [facility]: @ cap per website No 72 HH.    [facility]:8 LOW ACUITY beds posted.  Not appropriate  [facility]:  @ cap per website.  Per Adrianne, at cap  [facility]: @ cap per website.  Per Allison on [facility]: 2 low acuity beds.  Voluntary pts only.  Not appropriate  [facility]: 6 beds posted.  Out of state.  Not appropriate  [facility]: 4 beds posted.  Screening for VERY LOW acuity only.  Not appropriate.    Pt remains on work list until appropriate placement is available

## 2023-01-31 NOTE — ED PROVIDER NOTES
Sign-out Note    Received this patient in sign-out from Dr. Ware at 6:07 AM.  Please refer to earlier documentation detailing presenting complaints, evaluation, and ED course.  In brief, patient is being seen in the ER for mental health problem.  Decompensated psychosis.  Extended care DEC continues to follow along.    Recommendations from previous provider:  Awaiting inpatient mental health admission    ED course under my care:  No acute events under my care.  Will sign-out to evening provider pending transfer to inpatient mental health unit.    Disposition: Transfer of care           Raymon Reyes MD  01/31/23 5405

## 2023-02-01 LAB — VALPROATE SERPL-MCNC: 90.9 UG/ML

## 2023-02-01 PROCEDURE — 36415 COLL VENOUS BLD VENIPUNCTURE: CPT | Performed by: PSYCHIATRY & NEUROLOGY

## 2023-02-01 PROCEDURE — 250N000013 HC RX MED GY IP 250 OP 250 PS 637: Performed by: PSYCHIATRY & NEUROLOGY

## 2023-02-01 PROCEDURE — 124N000002 HC R&B MH UMMC

## 2023-02-01 PROCEDURE — 99223 1ST HOSP IP/OBS HIGH 75: CPT | Mod: AI | Performed by: PSYCHIATRY & NEUROLOGY

## 2023-02-01 PROCEDURE — 80164 ASSAY DIPROPYLACETIC ACD TOT: CPT | Performed by: PSYCHIATRY & NEUROLOGY

## 2023-02-01 RX ORDER — PALIPERIDONE 3 MG/1
3 TABLET, EXTENDED RELEASE ORAL DAILY
Status: DISCONTINUED | OUTPATIENT
Start: 2023-02-01 | End: 2023-02-15 | Stop reason: HOSPADM

## 2023-02-01 RX ADMIN — OLANZAPINE 10 MG: 10 TABLET, FILM COATED ORAL at 08:50

## 2023-02-01 RX ADMIN — NICOTINE POLACRILEX 4 MG: 2 LOZENGE ORAL at 19:41

## 2023-02-01 RX ADMIN — NICOTINE POLACRILEX 4 MG: 2 LOZENGE ORAL at 13:19

## 2023-02-01 RX ADMIN — BUSPIRONE HYDROCHLORIDE 10 MG: 10 TABLET ORAL at 13:19

## 2023-02-01 RX ADMIN — DIVALPROEX SODIUM 500 MG: 500 TABLET, DELAYED RELEASE ORAL at 19:38

## 2023-02-01 RX ADMIN — NICOTINE 1 PATCH: 21 PATCH, EXTENDED RELEASE TRANSDERMAL at 08:50

## 2023-02-01 RX ADMIN — HYDROXYZINE HYDROCHLORIDE 50 MG: 50 TABLET, FILM COATED ORAL at 15:45

## 2023-02-01 RX ADMIN — MELATONIN TAB 3 MG 3 MG: 3 TAB at 19:39

## 2023-02-01 RX ADMIN — BUSPIRONE HYDROCHLORIDE 10 MG: 10 TABLET ORAL at 19:38

## 2023-02-01 RX ADMIN — BUSPIRONE HYDROCHLORIDE 10 MG: 10 TABLET ORAL at 08:50

## 2023-02-01 RX ADMIN — PALIPERIDONE 3 MG: 3 TABLET, EXTENDED RELEASE ORAL at 10:52

## 2023-02-01 RX ADMIN — HYDROXYZINE HYDROCHLORIDE 50 MG: 50 TABLET, FILM COATED ORAL at 19:39

## 2023-02-01 RX ADMIN — DIVALPROEX SODIUM 500 MG: 500 TABLET, DELAYED RELEASE ORAL at 08:50

## 2023-02-01 ASSESSMENT — ACTIVITIES OF DAILY LIVING (ADL)
ADLS_ACUITY_SCORE: 28
ORAL_HYGIENE: INDEPENDENT
HYGIENE/GROOMING: INDEPENDENT
ADLS_ACUITY_SCORE: 28
ORAL_HYGIENE: INDEPENDENT
LAUNDRY: WITH SUPERVISION
ADLS_ACUITY_SCORE: 28
HYGIENE/GROOMING: INDEPENDENT
ADLS_ACUITY_SCORE: 28
DRESS: SCRUBS (BEHAVIORAL HEALTH)

## 2023-02-01 NOTE — PROGRESS NOTES
SPIRITUAL HEALTH SERVICES Progress Note  [facility]    Attempted to visit pt Christina per admission request & routine consult for Yazdanism/spiritual ritual or concern, but Christina was sleeping. Consulted with pt's nurse, who said it would be appropriate to visit pt tomorrow.    Plan: I will visit Christina tomorrow afternoon.    Berta Sepulveda, [facility]  Chaplain Resident  Pager [phone]      * [facility] remains available 24/7 for emergent requests/referrals, either by having the switchboard page the on-call  or by entering an ASAP/STAT consult in Epic (this will also page the on-call ). Routine Epic consults receive an initial response within 24 hours.*

## 2023-02-01 NOTE — DISCHARGE INSTRUCTIONS
Behavioral Discharge Planning and Instructions    Summary: You were admitted to Station 10 on 1/31/2023 due to Manic Symptomology. You were treated by Lulu Brown DO, Magnus James MD, and CLAUDE Cochran CNP and provisionally discharged on 2/15/2023 to [facility]: [address], [place] [address] (phone: [phone], fax: [phone]).    You were dually committed to the [facility] and the [facility] on 11/17/2022.  You were also court ordered to take the medications the doctor ordered for you. You are being discharged on a Provisional Discharge Agreement which shall remain in effect for the duration of the Commitment.  Your Commitment expires on 5/17/2023.      Continue to follow with your assigned [facility]  - Vero De Leon (phone: [phone]).     Main Diagnosis:   Bipolar disorder type 1, severe with psychosis versus schizoaffective disorder, bipolar type  Polysubstance abuse    Health Care Follow-up:   Appointment Date/Time: Monday, February 21 at 1:20pm via telehealth   Psychiatrist/Medication Management: Inge Taylor PA-C     Address: [address], [address], [place] [address]   Phone Number: [phone]    Fax: [phone]  Note: Call the clinic to provide updated phone number and email address. An email address is required for this appointment as intake paperwork and the link to join will be sent to it. Intake paperwork is required to be completed prior to your appointment, and if incomplete, your appointment will need to be rescheduled. Please call Kristan (phone: [phone]) to provide this information.     Attend all scheduled appointments with your outpatient providers. Call at least 24 hours in advance if you need to reschedule an appointment to ensure continued access to your outpatient providers.     Major Treatments, Procedures and Findings:  You were provided  "with: a psychiatric assessment, assessed for medical stability, medication evaluation and/or management, group therapy, and milieu management    Symptoms to Report: Feeling more aggressive, increased confusion, losing more sleep, mood getting worse, or thoughts of suicide.    Early warning signs can include: Increased depression or anxiety sleep disturbances increased thoughts or behaviors of suicide or self-harm  increased unusual thinking, such as paranoia or hearing voices.    Safety and Wellness: Take all medicines as directed. Make no changes unless your doctor suggests them. Follow treatment recommendations. Refrain from alcohol and non-prescribed drugs. Ask your support system to help you reduce your access to items that could harm yourself or others. If there is a concern for safety, call 171.    Resources:   General Mental Health Resources:   National [place] on Mental Illness ([place]) [place]: Connect for help, to navigate the mental health system, and for support and for resources. Call: 4-572-QCQV-Helps / 9-223-376-8738  Crisis Text Line: The 24/7 emergency service is available if you or someone you know is experiencing a psychiatric or mental health crisis. Text: \"[place]\" to 249267  [place] [facility]: Are you an adult needing support? Talk to a specialist who has firsthand experience living with a mental health condition. Call: 312.427.8710  Text: \"Support\" to 94248  mentalhealthmn.org/support/[facility]/  National Suicide Prevention Lifeline: The 24/7 lifeline provides support when in distress, has prevention and crisis resources for you or your loved ones, and resources for professionals. Call 2-165-397-TALK (6749)  Peer Support Connection Warmlines: Hxoy-px-vvvd telephone support that s safe and supportive. Open 5 p.m. to 9 a.m. Call or text: 1-365.399.6016   COVID Cares Stress Phone Support Service. Any [place] experiencing stress can call 604-EAQO3IT (737-195-6789) for free telephone " support from 9am to 9pm every day. The service is a collaboration with volunteers from the [place] Psychiatric Society, the [place] Psychological Association, the [facility] Psychologists, and [facility]. The free service is also accessible at [employer] where searchers can also find psychiatric and mental health services availability and real-time Substance Use Disorder Treatment program openings.  Adult Rehabilitative Mental Health Services (ARM): https://[place].gov/dhs/partners-and-providers/policies-procedures/adult-mental-health/adult-rehabilitative-mental-health-services/armhs-certified-providers/  [facility] Response 374-946-6313    Outpatient Psychiatry/Therapy Resources:   [facility] - (phone: 294.151.1733) https://www.[employer]care/specialty/mental-behavioral-health/  https://www.[employer]care/find/doctors/psychologists/  [facility] - (phone: 5-384-HSUXSXRL) https://www.[facility].org/treatments/Counseling-Adult  [facility] - (phone: 622.747.4951) https://[facility] HospitalIts[facility].VCE/  [facility] - (phone: 683.638.1761)  https://[place] [facility]/  [facility] - (phone: 1-974.270.9854) https://www.[facility]/  [facility] - (phone: 884.673.6075) https://www.[employer].VCE/  [facility] - (phone: 229.767.2547) https://[employer].VCE/  [facility] - (phone: 976.543.2785) https://walkin.org/    General Medication Instructions:   See your medication sheet(s) for instructions.   Take all medicines as directed.  Make no changes unless your doctor suggests them.   Go to all your doctor visits.  Be sure to have all your required lab tests. This way, your medicines can be refilled on time.  Do not use any drugs not prescribed by your doctor.  Avoid alcohol.    Advance Directives:   Scanned  document on file with [facility]? No scanned doc  Is document scanned? No. Copy Requested.  Honoring Choices Your Rights Handout: Informed and given  Was more information offered? Pt declined    The Treatment team has appreciated the opportunity to work with you. If you have any questions or concerns about your recent admission, you can contact the unit which can receive your call 24 hours a day, 7 days a week. They will be able to get in touch with a Provider if needed. The unit number is [phone].

## 2023-02-01 NOTE — PLAN OF CARE
Pt has a flat affect with depressed mood. Pt rates anxiety at 5/10 and depression 0/10. Pt rates pain at 0/10. Pt reports no SI/HI/SIB and contracts for safety. Pt denies any hallucinations and not noted responding to any internal stimuli. Pt was medication compliant. No reported or observed medication side effects. Pt was visible on unit for meals and withdrawn to herself. Pt was mostly isolative to her room. Continue current POC.    Plan of Care Reviewed With: (P) patient Plan of Care Reviewed With: patient    Overall Patient Progress: no changeOverall Patient Progress: no change

## 2023-02-01 NOTE — H&P
"[facility] [place]   Psychiatric History and Physical  Admission date: 1/31/2023        Chief Complaint:   \"I'm not sure why I'm here.\"         HPI:     The patient is a 51yo female with a history of bipolar disorder and substance-induced psychosis who was admitted after being off her medications and demonstrating manic symptoms. The patient says that she \"ran out of gas\" and \"went somewhere to warm up and they brought me here.\" Doesn't feel that she needs to be here. Says that she has a \"safe house in [place]\" but then later says she is at [facility]\" and that there is a  there. Mood is \"good - I just had a bad day.\" Denies problems with sleep or appetite. Denies SI or HI. Denies AH or VH. Denies illicit drug use. Says that she ran out of her medications and wasn't able to get them filled.      Per ER:  Christina Vásquez is a 52 year old female with a history of anxiety, depression, bipolar disorder, schizophrenia, psychosis, and polysubstance abuse who presents via EMS for a mental health evaluation. EMS reports that the patient was found by police in a broken down car (out of gas) tonight and was unable to provide history or tell them how she got there. PD adds that she has not been taking her medications, not sleeping well recently, and has been seeing people that are not there. She has been seen in the ED several times for similar symptoms and was most recently admitted in November of 2022. In the past she reported that her head is \"full of information\" and when she is seen by the ED this information is taken and then she is discharged to the \"streets\". Christina is currently paranoid and believes everyone is out to get her, particularly electronic companies that are supposedly stealing her information. She notes that she would like to talk the president or  of an optical company \"now.\" She adds that she would also like staff to get a hold of her old employer and the " "\"president of this hospital.\" When asked about these requests she states \"It's an emergency.\"         Past Psychiatric History:     Bipolar disorder versus SAD, bipolar type. Recently discharged in December. On commitment with [facility].     From 11/22 hospitalization:  Hospitalizations: This is her third hospitalization.  She says the first 1 was when she was 20 years old, the last 1 was in January 2022  Suicide Attempts: X1 at age 20 with overdose        Substance Use and History:     Stimulant use disorder. History of meth-induced psychosis.         Past Medical History:   PAST MEDICAL HISTORY:   Past Medical History:   Diagnosis Date     Amphetamine abuse      Anxiety      Asthma      Bipolar disorder      Cocaine abuse (H)        PAST SURGICAL HISTORY: No past surgical history on file.          Family History:   FAMILY HISTORY: Patient reports a history of bipolar disorder in her family.         Social History:   Please see the full psychosocial profile from the clinical treatment coordinator.   SOCIAL HISTORY:   Social History     Tobacco Use     Smoking status: Not on file     Smokeless tobacco: Not on file   Substance Use Topics     Alcohol use: Not on file            Physical ROS:   The 10-point review of systems was negative except as noted in HPI.         PTA Medications:     Medications Prior to Admission   Medication Sig Dispense Refill Last Dose     busPIRone (BUSPAR) 10 MG tablet Take 1 tablet (10 mg) by mouth 3 times daily 90 tablet 0      divalproex sodium delayed-release (DEPAKOTE) 500 MG DR tablet Take 1 tablet (500 mg) by mouth every 12 hours 60 tablet 0      hydrOXYzine (ATARAX) 50 MG tablet Take 1 tablet (50 mg) by mouth every 4 hours as needed for anxiety 30 tablet 0      paliperidone ER (INVEGA) 3 MG 24 hr tablet Take 1 tablet (3 mg) by mouth At Bedtime 30 tablet 0      traZODone (DESYREL) 50 MG tablet Take 1 tablet (50 mg) by mouth nightly as needed for sleep (may repeat after 60 minutes) 30 " "tablet 0           Allergies:     Allergies   Allergen Reactions     Penicillins Itching          Labs:     Recent Results (from the past 48 hour(s))   HCG qualitative urine    Collection Time: 01/31/23  6:06 PM   Result Value Ref Range    hCG Urine Qualitative Negative Negative   Drug abuse screen 6 urine (chem dep) ([facility])    Collection Time: 01/31/23  6:06 PM   Result Value Ref Range    Amphetamines Urine Screen Negative Screen Negative    Barbiturates Urine Screen Negative Screen Negative    Benzodiazepines Urine Screen Negative Screen Negative    Cannabinoids Urine Screen Negative Screen Negative    Cocaine Urine Screen Negative Screen Negative    Ethanol Urine Screen Negative Screen Negative    Opiates Urine Screen Negative Screen Negative          Physical and Psychiatric Examination:     /67 (BP Location: Left arm)   Pulse 82   Ht 1.6 m (5' 3\")   Wt 63.5 kg (140 lb)   SpO2 100%   BMI 24.80 kg/m    Weight is 140 lbs 0 oz  Body mass index is 24.8 kg/m .    Physical Exam:  I have reviewed the physical exam as documented by the medical team and agree with findings and assessment and have no additional findings to add at this time.    Mental Status Exam:  Appearance: awake, alert and adequately groomed  Attitude:  somewhat cooperative  Eye Contact:  good  Mood:  good  Affect:  mood congruent  Speech:  clear, coherent  Language: fluent and intact in English  Psychomotor, Gait, Musculoskeletal:  no evidence of tardive dyskinesia, dystonia, or tics  Thought Process:  goal oriented  Associations:  no loose associations  Thought Content:  no evidence of suicidal ideation or homicidal ideation  Insight:  limited  Judgement:  limited  Oriented to:  time, person, and place  Attention Span and Concentration:  fair  Recent and Remote Memory:  poor  Fund of Knowledge:  appropriate         Admission Diagnoses:     Bipolar disorder type 1, severe with psychosis versus schizoaffective disorder, bipolar " type  Polysubstance abuse  History of methamphetamine-induced psychosis         Assessment & Plan:     1) Restart Invega, VPA and Buspar.   2) Patient admitted under revoked PD. Committed with [facility].   3) Patient okay for roommate.     Disposition Plan   Reason for ongoing admission: is unable to care for self due to severe psychosis or lacie  Discharge location: TBD  Discharge Medications: not ordered  Follow-up Appointments: not scheduled  Legal Status: full commitment    Entered by: Magnus James MD on 2/1/2023 at 5:52 AM
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
## 2023-02-01 NOTE — PLAN OF CARE
02/01/23 1400   General Information   Has Not Attended OT as of: 02/01/23     Plan: OT staff will meet with pt to review the role of occupational therapy and explain the value of having them involved in their treatment plan including options to meet current needs/self-identified goals. As group attendance is established, Pt will be given self-assessment to inform OT initial assessment.

## 2023-02-01 NOTE — PLAN OF CARE
"Assessment/Intervention/Current Symtoms and Care Coordination:  -Refer to psychosocial completed on 2/1/23 for assessment/social functioning  -Chart review  -Writer called [facility] (phone: [phone]) and confirmed there is no Ragland order - during last admission the Ragland petition was withdrawn by the hospital.   -Team meeting - RN reports Zyprexa was ordered but Invega was not. Writer provided update there is no Ragland - team reports Christina is willing to take medications.  -Team received copy of Order for Apprehension and/or Hold and Return to Hospital/Treatment Facility from [facility].   -Writer met with Christina to introduce self and explain role. She indicated she does not feel she needs to be in the hospital and that it was a misunderstanding the day she was picked up by EMS. Christina says she ran out of gas twice that day and realizes she was in a bad mood, but says she's been doing \"pretty damn well\" since last hospitalization. She says she has job interviews coming up and has been working with the  at the [facility] to get this in motion. Christina is uncertain who her current  is and seemed somewhat surprised that her PD was revoked. Writer asked about current income and she said she tried to apply for SSDI but couldn't find the social security office and drove around the block/area looking for it, however she also indicated that she sat for hours in the waiting area only to be told she needed to make an appointment. Christina states her car was likely brought to the impound lot upon her being picked up by EMS and she's concerned about the cost that is accumulating. Christina indicated she did not attend outpatient treatment and thought writer was supposed to set that up for her or her outpatient . Writer asked if Christina had followed up with outpatient providers at [facility] and Christina stated she could not find the clinic and did not attend appointments or reschedule due to " "issues with her phone. Christina is agreeable to establishing with new providers and says she would go to CD treatment \"if I have to\", though she says she has been sober for some time. Christina is hoping for update ASAP about when she can discharge and would like to ensure she is able to return to or at least obtain her belongings from the motel she was staying at as she says she has her birth certificate and social security card there along with \"the rest of my life\". Christina is requesting for writer to get in contact with staff at the [facility] and her mental health  to develop a plan for discharge.   Current Symptoms include the following: anxious  Precautions: Elopement and Cheeking    Discharge Plan or Goal:  Pending stabilization & development of a safe discharge plan.  Considerations include: Return to [facility] versus MICD treatment    Barriers to Discharge:  Christina presents following suspected medication non-adherence and manic symptoms. Her provisional discharge has been revoked by her mental health . She requires symptom stabilization, medication management, and supportive discharge plan.     Referral Status:  Referrals TBD    Legal Status:  Patient is under an MICD Commitment in [facility]   Case No. [id number]    Contacts:    Brother - Santo Torres (phone: [phone])    Upcoming Meetings/Important Dates:  N/A    Rationale for SIO/No Roommate Order:  Patient is not on SIO.  Patient does not have current roommate. There is potential for this patient to have roommate. (No roommate order discontinued on 2/1/2023).                           "

## 2023-02-01 NOTE — PLAN OF CARE
02/01/23 1459   Individualization/Patient Specific Goals   Patient Personal Strengths expressive of emotions;family/social support   Patient Vulnerabilities limited support system;substance abuse/addiction;lacks insight into illness   Behavioral Team Discussion   Participants Magnus James MD; Paddy Dent RN; Mckenna Joya [facility]   Progress Minimal   Anticipated length of stay 5-7 days   Continued Stay Criteria/Rationale Christina presents following suspected medication non-adherence and manic symptoms. Her provisional discharge has been revoked by her mental health . She requires symptom stabilization, medication management, and supportive discharge plan.   Precautions Elopement and Cheeking   Plan Gather collateral and connect with  following revocation of PD. Determine what next level of care would be most supportive - consider residential treatment given outpatient treatment has already been tried and Christina lacks stable housing.   Anticipated Discharge Disposition substance use treatment;[facility]

## 2023-02-01 NOTE — PLAN OF CARE
"Pt was admitted to [facility] from [facility] ED at 1600. Down to gown was completed. Pt was admitted for psychosis. Per the ED note \"patient was found by police in a broken down car (out of gas) tonight and was unable to provide history or tell them how she got there. PD adds that she has not been taking her medications, not sleeping well recently, and has been seeing people that are not there\".  Pt stated she ran out of medications and was unable to get refills. Pt reports missing her f/unit(s) psychiatrist apt after her last hospitalization due to not being able to find the clinic. Upon arrival to unit patient presents as cooperative. Affect is flat blunted and mood is anxious. Thought process presents as impaired. Speech is clear and rate is normal. Pt rates anxiety at 3/10 and depression at 0/10. Pt was given hydroxyzine per request. Pt denies any SI/HI/SIB and contracts for safety. Patient denies any visual/ auditory hallucinations. Pt was medication compliant with no cheeking noted. Patient was admitted on a court hold status as pt provisional discharge was revoked. Patient was placed on elopement and cheeking precautions. Pt has poor insight and judgement into her mental health. Pt showered this evening. Pt was visible in the milieu but withdrawn to herself. Care plan and education was initiated.               "

## 2023-02-02 PROCEDURE — 250N000013 HC RX MED GY IP 250 OP 250 PS 637: Performed by: PSYCHIATRY & NEUROLOGY

## 2023-02-02 PROCEDURE — 99233 SBSQ HOSP IP/OBS HIGH 50: CPT | Performed by: PSYCHIATRY & NEUROLOGY

## 2023-02-02 PROCEDURE — 124N000002 HC R&B MH UMMC

## 2023-02-02 PROCEDURE — G0177 OPPS/PHP; TRAIN & EDUC SERV: HCPCS

## 2023-02-02 RX ADMIN — BUSPIRONE HYDROCHLORIDE 10 MG: 10 TABLET ORAL at 08:34

## 2023-02-02 RX ADMIN — DIVALPROEX SODIUM 500 MG: 500 TABLET, DELAYED RELEASE ORAL at 20:21

## 2023-02-02 RX ADMIN — MELATONIN TAB 3 MG 3 MG: 3 TAB at 20:21

## 2023-02-02 RX ADMIN — BUSPIRONE HYDROCHLORIDE 10 MG: 10 TABLET ORAL at 20:21

## 2023-02-02 RX ADMIN — NICOTINE 1 PATCH: 21 PATCH, EXTENDED RELEASE TRANSDERMAL at 08:34

## 2023-02-02 RX ADMIN — HYDROXYZINE HYDROCHLORIDE 50 MG: 50 TABLET, FILM COATED ORAL at 12:56

## 2023-02-02 RX ADMIN — NICOTINE POLACRILEX 4 MG: 2 LOZENGE ORAL at 12:57

## 2023-02-02 RX ADMIN — NICOTINE POLACRILEX 4 MG: 2 LOZENGE ORAL at 14:48

## 2023-02-02 RX ADMIN — DIVALPROEX SODIUM 500 MG: 500 TABLET, DELAYED RELEASE ORAL at 08:34

## 2023-02-02 RX ADMIN — PALIPERIDONE 3 MG: 3 TABLET, EXTENDED RELEASE ORAL at 08:34

## 2023-02-02 RX ADMIN — BUSPIRONE HYDROCHLORIDE 10 MG: 10 TABLET ORAL at 13:46

## 2023-02-02 ASSESSMENT — ACTIVITIES OF DAILY LIVING (ADL)
ADLS_ACUITY_SCORE: 28
DRESS: SCRUBS (BEHAVIORAL HEALTH)
ADLS_ACUITY_SCORE: 28
HYGIENE/GROOMING: INDEPENDENT
ADLS_ACUITY_SCORE: 28
ORAL_HYGIENE: INDEPENDENT
ADLS_ACUITY_SCORE: 28

## 2023-02-02 NOTE — PLAN OF CARE
02/02/23 1300   General Information   Date Initially Attended OT 02/02/23 02/02/23 1300   Group Therapy Session   Group Attendance attended group session   Time Session Began 1015   Time Session Ended 1145   Total Time (minutes) 50   Total # Attendees 4   Group Type Occupational Therapy   Group Topic Covered balanced lifestyle;coping skills/lifestyle management;leisure exploration/use of leisure time;relaxation techniques   Group Session Detail OT Clinic Group   Patient Participation Detail Intervention: OT Clinic with 3 peers. Pt participated in a OT Clinic group to facilitate coping skills exploration and creative expression through personally meaningful activities, and to encourage utilization of these healthy coping skills to promote overall health and wellness. Group included clinical observation of social, cognitive and kinesthetic performance skills to inform treatment and safe discharge planning.    Patient Response: Joined clinic group and was oriented to clinic purpose, structure and project options available. Pt was moderately social with writer, shared how she had gotten some tips on completing certain types of craft projects from her daughter. Did not engage in conversation with peers in the room, however demonstrated focus and attention to project. Left for a period of time to take a break and returned later to work on project for another 15 minutes or so.     Mood/Affect: Pleasant       Thought Content:   Blocked           [place]          Delusional            Disorganized          Generalizes/minimizes     Grandiose        Hopeless          Insightful            Lacks insight/judgement    Reality Oriented   Paranoid               Perseverative              Poverty of thought            Other:     Plan: Patient encouraged to maintain attendance for continued ongoing support in working towards occupational therapy goals to support overall treatment/care. More information is needed to complete  initial OT assessment. As group attendance is established, OT will provide pt with self-assessment form to inform treatment planning/goal setting, and provide explanation of the benefit of participation in occupational therapy services to overall treatment/care during hospitalization.

## 2023-02-02 NOTE — PLAN OF CARE
"Assessment/Intervention/Current Symtoms and Care Coordination:  -Chart review  -Team meeting - Christina endorses some anxiety and depression. She has been accepting of medications and has not been attending groups so far. Team will work to gather collateral information to inform recommendations.   -Writer contacted [facility] (phone: [phone]) to request updated  and was told Christina is assigned to Vero De Leon.   -Writer reached out to Vero and left voicemail, as well as sent an email. Automatic response indicates she will be away from work until Monday 2/6.   -Writer met with Christina to provide check-in. She provided updated information for where she's been staying at [facility] and is appreciative that team is working to get connected with .   -Writer called [facility] (phone: [phone]) to try to connect with Christina's . Writer was told by  that they host [facility] offices and worker assigned to Christina is Marycarmen \"Ali\" (phone: [phone]).   Current Symptoms include the following: anxious  Precautions: Elopement and Cheeking     Discharge Plan or Goal:  Pending stabilization & development of a safe discharge plan.  Considerations include: Return to [facility] mot versus MICD treatment     Barriers to Discharge:  Christina presents following suspected medication non-adherence and manic symptoms. Her provisional discharge has been revoked by her mental health . She requires symptom stabilization, medication management, and supportive discharge plan.      Referral Status:  Referrals TBD     Legal Status:  Patient is under an MICD Commitment in [facility]   Case No. [id number]     Contacts:    Brother - Santo Torres (phone: [phone])    [facility]  - Vero De Leon (phone: [phone], email: [email or web address])     Upcoming Meetings/Important " Dates:  N/A     Rationale for SIO/No Roommate Order:  Patient is not on SIO.  Patient does not have current roommate. There is potential for this patient to have roommate. (No roommate order discontinued on 2/1/2023).

## 2023-02-02 NOTE — PLAN OF CARE
Goal Outcome Evaluation:    Plan of Care Reviewed With: patient      Patient was in bed at the beginning of the shift, breathing quietly and unlabored.    Pt slept 7 hrs    No concerns reported or noted this shift.  Will continue to Monitor.  PRNs:None

## 2023-02-02 NOTE — PROGRESS NOTES
"[facility], [place]   Psychiatric Progress Note        Interim History:   The patient's care was discussed with the treatment team during the daily team meeting and/or staff's chart notes were reviewed.  Staff report patient is doing okay. Feels safe here. Has not been attending groups.     The patient reports that she is doing well. Sleeping and eating well. Denies SI or HI. Denies AH or VH. Feels better on her medications. Hopeful to return to her extended stay \"safe house.\" No concerns at this time.          Medications:       busPIRone  10 mg Oral TID     divalproex sodium delayed-release  500 mg Oral Q12H     nicotine  1 patch Transdermal Daily     nicotine   Transdermal Q8H     paliperidone  3 mg Oral Daily          Allergies:     Allergies   Allergen Reactions     Penicillins Itching          Labs:   No results found for this or any previous visit (from the past 24 hour(s)).       Psychiatric Examination:     /76 (Patient Position: Left side)   Pulse 82   Temp 97.7  F (36.5  C) (Oral)   Resp 16   Ht 1.6 m (5' 3\")   Wt 64.5 kg (142 lb 3.2 oz)   SpO2 99%   BMI 25.19 kg/m    Weight is 142 lbs 3.2 oz  Body mass index is 25.19 kg/m .  Orthostatic Vitals       Most Recent      Standing Orthostatic /72 02/02 0857    Standing Orthostatic Pulse (bpm) 94 02/02 0857            Appearance: awake, alert and adequately groomed  Attitude:  cooperative  Eye Contact:  good  Mood:  better  Affect:  mood congruent  Speech:  clear, coherent  Psychomotor Behavior:  no evidence of tardive dyskinesia, dystonia, or tics  Thought Process:  goal oriented  Associations:  no loose associations  Thought Content:  no evidence of suicidal ideation or homicidal ideation and no evidence of psychotic thought  Insight:  limited  Judgement:  poor  Oriented to:  time, person, and place  Attention Span and Concentration:  intact  Recent and Remote Memory:  poor         Precautions:     Behavioral " Orders   Procedures     Cheeking Precautions (behavioral units)     Code 1 - Restrict to Unit     Elopement precautions     Routine Programming     As clinically indicated     Status 15     Every 15 minutes.          Diagnoses:     Bipolar disorder type 1, severe with psychosis versus schizoaffective disorder, bipolar type  Polysubstance abuse  History of methamphetamine-induced psychosis          Assessment & Plan:      1) Continue Invega, VPA and Buspar. VPA level was 90.9  2) Patient admitted under revoked PD. Committed with [facility].   3) CTC to coordinate disposition with [facility] .       Disposition Plan   Reason for ongoing admission: is unable to care for self due to severe psychosis or lacie  Discharge location: TBD  Discharge Medications: not ordered  Follow-up Appointments: not scheduled  Legal Status: full commitment    Entered by: Magnus James MD on February 2, 2023 at 2:07 PM

## 2023-02-02 NOTE — PLAN OF CARE
Goal Outcome Evaluation:    Plan of Care Reviewed With: patient       Patient is alert and oriented, presented with a blunted affect, her mood was anxious and depressed. Patient was present in the milieu, pacing in the hallway while listening to music on her headphones. She denied having any pain or discomfort, denied SI/SIB/HI and hallucinations, reported feeling anxious, rated it a 6/10 and denied feeling depressed. Patient requested PRN Hydroxyzine which she reported wasn't helpful. Took second dose of Hydroxyzine @ HS with her other scheduled med's. Pt's appetite is adequate, VS stable.

## 2023-02-02 NOTE — PLAN OF CARE
"Patient paced in the hallway with headphones on . She attended OT group but left the room before  end of the group session. Patient presented with an anxious mood. Affect is flat. Patient denied SI/SIB/AVH. She endorsed anxiety of a 6/10. She reported low depression. Patient denied pain. Patient reported good sleep. Appetite is adequate. Medication compliant, no side effects of medications noted. Grooming is adequate.   PRNs- hydroxyzine and nicotine lozenges.   /76 (Patient Position: Left side)   Pulse 82   Temp 97.7  F (36.5  C) (Oral)   Resp 16   Ht 1.6 m (5' 3\")   Wt 64.5 kg (142 lb 3.2 oz)   SpO2 99%   BMI 25.19 kg/m                            "

## 2023-02-02 NOTE — PROGRESS NOTES
SPIRITUAL HEALTH SERVICES Progress Note  [facility] ([facility]) 10N    Visited pt Christina per admission request and routine consult.    Christina was in the middle of a card game with another patient and requested that I come back later.     Plan: We agreed that I will check in with her tomorrow morning.    Berta Sepulveda, [facility]  Chaplain Resident  Pager [phone]      * [facility] remains available 24/7 for emergent requests/referrals, either by having the switchboard page the on-call  or by entering an ASAP/STAT consult in Epic (this will also page the on-call ). Routine Epic consults receive an initial response within 24 hours.*

## 2023-02-03 PROCEDURE — 250N000013 HC RX MED GY IP 250 OP 250 PS 637: Performed by: PSYCHIATRY & NEUROLOGY

## 2023-02-03 PROCEDURE — 124N000002 HC R&B MH UMMC

## 2023-02-03 PROCEDURE — G0177 OPPS/PHP; TRAIN & EDUC SERV: HCPCS

## 2023-02-03 RX ADMIN — DIVALPROEX SODIUM 500 MG: 500 TABLET, DELAYED RELEASE ORAL at 08:32

## 2023-02-03 RX ADMIN — PALIPERIDONE 3 MG: 3 TABLET, EXTENDED RELEASE ORAL at 08:32

## 2023-02-03 RX ADMIN — TRAZODONE HYDROCHLORIDE 50 MG: 50 TABLET ORAL at 20:33

## 2023-02-03 RX ADMIN — NICOTINE 1 PATCH: 21 PATCH, EXTENDED RELEASE TRANSDERMAL at 08:38

## 2023-02-03 RX ADMIN — NICOTINE POLACRILEX 4 MG: 2 LOZENGE ORAL at 13:09

## 2023-02-03 RX ADMIN — HYDROXYZINE HYDROCHLORIDE 50 MG: 50 TABLET, FILM COATED ORAL at 17:49

## 2023-02-03 RX ADMIN — BUSPIRONE HYDROCHLORIDE 10 MG: 10 TABLET ORAL at 08:32

## 2023-02-03 RX ADMIN — HYDROXYZINE HYDROCHLORIDE 50 MG: 50 TABLET, FILM COATED ORAL at 13:09

## 2023-02-03 RX ADMIN — MELATONIN TAB 3 MG 3 MG: 3 TAB at 19:11

## 2023-02-03 RX ADMIN — DIVALPROEX SODIUM 500 MG: 500 TABLET, DELAYED RELEASE ORAL at 19:11

## 2023-02-03 RX ADMIN — BUSPIRONE HYDROCHLORIDE 10 MG: 10 TABLET ORAL at 19:11

## 2023-02-03 RX ADMIN — BUSPIRONE HYDROCHLORIDE 10 MG: 10 TABLET ORAL at 13:03

## 2023-02-03 RX ADMIN — NICOTINE POLACRILEX 4 MG: 2 LOZENGE ORAL at 17:49

## 2023-02-03 ASSESSMENT — ACTIVITIES OF DAILY LIVING (ADL)
ADLS_ACUITY_SCORE: 28
HYGIENE/GROOMING: INDEPENDENT
ADLS_ACUITY_SCORE: 28
ADLS_ACUITY_SCORE: 28
DRESS: SCRUBS (BEHAVIORAL HEALTH)
ADLS_ACUITY_SCORE: 28
LAUNDRY: WITH SUPERVISION
ORAL_HYGIENE: INDEPENDENT
ADLS_ACUITY_SCORE: 28
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
## 2023-02-03 NOTE — PLAN OF CARE
"Assessment/Intervention/Current Symtoms and Care Coordination:  -Chart review  -Writer met with Christina who stated she had a list of questions. She asked if writer had gotten in touch with her  through [facility] - writer indicated they obtained phone number and plan to reach out. Writer also provided Madie's phone number to Christina. Christina also asked about \"classes\" that are supposed to start, though was not able to provide details about what this meant. She stated she was told by outpatient  that this was going to be set up in a month and it's been a month since she was told this. Christina is requesting discharge timeline and stated she was not told this by provider when asked. Christina asked if writer could work on determining whether her belongings are still at the [facility] where she was residing. Christina wants to work on application for SSDI, and would also like help scheduling with a PCP and dentist.   -Writer called [facility] (phone: [phone]), emergency [facility] , left voicemail at 3:51pm.   -Writer gave SSDI paperwork to Christina and she plans to look over it this weekend. Christina completed YESI for herself for [place] and for [facility] to obtain records for SSDI. She is also hoping to complete YESI for [facility].   Current Symptoms include the following: anxious  Precautions: Elopement and Cheeking     Discharge Plan or Goal:  Pending stabilization & development of a safe discharge plan.  Considerations include: Return to [facility] versus [facility] treatment     Barriers to Discharge:  Christina presents following suspected medication non-adherence and manic symptoms. Her provisional discharge has been revoked by her mental health . She requires symptom stabilization, medication management, and supportive discharge plan.      Referral Status:  Referrals TBD (will need to establish with psychiatry, therapy, and PCP. Also seeking dental referral but " willing to work with outpatient  to obtain this).     Legal Status:  Patient is under an MICD Commitment in [facility]   Case No. [id number]     Contacts:    Brother - Santo Torres (phone: [phone])    [facility]  - Vero De Leon (phone: [phone], email: [email or web address].)     Upcoming Meetings/Important Dates:  N/A     Rationale for SIO/No Roommate Order:  Patient is not on SIO.  Patient does not have current roommate. There is potential for this patient to have roommate. (No roommate order discontinued on 2/1/2023).                        Statement Selected

## 2023-02-03 NOTE — PLAN OF CARE
"Patient is alert and oriented x4. Able to communicate needs. Patient presented with a calm mood. Affect is flat but brightened upon approach. Patient denied SI/SIB/AVH. She endorsed anxiety at a 5 out of 10. She denied depression. Denied pain. She is eating and drinking adequately. Sleep is adequate. Staff will continue to monitor. /75   Pulse 73   Temp 97.5  F (36.4  C) (Oral)   Resp 18   Ht 1.6 m (5' 3\")   Wt 64.5 kg (142 lb 3.2 oz)   SpO2 99%   BMI 25.19 kg/m                            "

## 2023-02-03 NOTE — PLAN OF CARE
Problem: Adult Behavioral Health Plan of Care  Goal: Absence of New-Onset Illness or Injury  Outcome: Progressing   Goal Outcome Evaluation:    Plan of Care Reviewed With: patient      Pt was calm during this shift.  She spent some of her time out on the unit.  She did not attempt to elope and she was med compliant.  Pt had a good appetite.  She was observed to be pacing in the hallway at times with headphones on.  Pt denies all psych symptoms and said that the med she took earlier in the day was helpful for her anxiety.  Plan of care is being followed.

## 2023-02-03 NOTE — PLAN OF CARE
NOC Shift Report     Pt in bed at beginning of shift, breathing quiet and unlabored. Pt slept through shift. Pt slept 7 hours.      No pt complaints or concerns at this time.      No PRNs given. Will continue to monitor.     Goal Outcome Evaluation:   Problem: Anxiety Signs/Symptoms  Goal: Improved Sleep (Anxiety Signs/Symptoms)  Intervention: Promote Healthy Sleep Hygiene  Recent Flowsheet Documentation  Taken 2/3/2023 0200 by Cristiano Silverman RN  Sleep Hygiene Promotion:    awakenings minimized    noise level reduced    regular sleep pattern promoted

## 2023-02-04 PROCEDURE — 124N000002 HC R&B MH UMMC

## 2023-02-04 PROCEDURE — 250N000013 HC RX MED GY IP 250 OP 250 PS 637: Performed by: PSYCHIATRY & NEUROLOGY

## 2023-02-04 PROCEDURE — H2032 ACTIVITY THERAPY, PER 15 MIN: HCPCS

## 2023-02-04 RX ADMIN — HYDROXYZINE HYDROCHLORIDE 50 MG: 50 TABLET, FILM COATED ORAL at 14:51

## 2023-02-04 RX ADMIN — NICOTINE POLACRILEX 4 MG: 2 LOZENGE ORAL at 14:55

## 2023-02-04 RX ADMIN — NICOTINE 1 PATCH: 21 PATCH, EXTENDED RELEASE TRANSDERMAL at 08:48

## 2023-02-04 RX ADMIN — DIVALPROEX SODIUM 500 MG: 500 TABLET, DELAYED RELEASE ORAL at 08:45

## 2023-02-04 RX ADMIN — NICOTINE POLACRILEX 4 MG: 2 LOZENGE ORAL at 19:27

## 2023-02-04 RX ADMIN — NICOTINE POLACRILEX 4 MG: 2 LOZENGE ORAL at 08:47

## 2023-02-04 RX ADMIN — PALIPERIDONE 3 MG: 3 TABLET, EXTENDED RELEASE ORAL at 08:45

## 2023-02-04 RX ADMIN — BUSPIRONE HYDROCHLORIDE 10 MG: 10 TABLET ORAL at 13:12

## 2023-02-04 RX ADMIN — DIVALPROEX SODIUM 500 MG: 500 TABLET, DELAYED RELEASE ORAL at 19:25

## 2023-02-04 RX ADMIN — ACETAMINOPHEN 650 MG: 325 TABLET, FILM COATED ORAL at 17:06

## 2023-02-04 RX ADMIN — TRAZODONE HYDROCHLORIDE 50 MG: 50 TABLET ORAL at 20:23

## 2023-02-04 RX ADMIN — HYDROXYZINE HYDROCHLORIDE 50 MG: 50 TABLET, FILM COATED ORAL at 08:47

## 2023-02-04 RX ADMIN — BUSPIRONE HYDROCHLORIDE 10 MG: 10 TABLET ORAL at 19:24

## 2023-02-04 RX ADMIN — MELATONIN TAB 3 MG 3 MG: 3 TAB at 19:24

## 2023-02-04 RX ADMIN — BUSPIRONE HYDROCHLORIDE 10 MG: 10 TABLET ORAL at 08:45

## 2023-02-04 ASSESSMENT — ACTIVITIES OF DAILY LIVING (ADL)
ADLS_ACUITY_SCORE: 28

## 2023-02-04 NOTE — PLAN OF CARE
Patient is alert and oriented x4. She spent a lot of time in her room listening to headphones. She came out for meals and medications. Mood is calm with a flat affect. Patient denied SI/SIB/AVH. She endorsed some anxiety which she rated at a 5 out of 10, hydroxyzine given with good relief.  Reported good appetite and good sleep. Grooming is adequate, she showered this morning.Denied pain. Medication compliant, no side effects noted.

## 2023-02-04 NOTE — PLAN OF CARE
Goal Outcome Evaluation:    Plan of Care Reviewed With: patient         Patient is alert and oriented, presented with a blunted,flat affect, her mood was anxious. Patient was present in the milieu, pacing in the hallway, listening to music on her headphones. Pt denied having any pain or discomfort, denied SI/SIB/HI and hallucinations. Patient reported feeling anxious, rated it a 6/10 and denied feeling depressed. Patient took PRN  Hydroxyzine earlier but stated that it didn't help, she took another dose at 1700 hrs. Pt is compliant with her med's had Melatonin and later took Trazodone. Pt's appetite is adequate, VS stable.

## 2023-02-04 NOTE — PLAN OF CARE
Pt appeared to sleep for a total of 7 hours overnight. No PRN medications were administered, and no concerns were noted.     Problem: Sleep Disturbance  Goal: Adequate Sleep/Rest  Outcome: Progressing

## 2023-02-04 NOTE — PLAN OF CARE
02/03/23 2737   Group Therapy Session   Group Attendance attended group session   Total Time (minutes) 50   Total # Attendees 3   Group Type psychoeducation   Group Topic Covered coping skills/lifestyle management;leisure exploration/use of leisure time;structured socialization   Group Session Detail OT check-in, clinic   Patient Response/Contribution cooperative with task     Pt worked on painting a wood frame along with wood letters spelling her daughters name while socializing with writer and peer.  Pt shares she has been homeless, though has been staying at a motel through the [facility] for 3 months, though had a stressful day and needed help - ending up in the hospital.  Pt shared a long history of mental health issues, acknowledges a need for support, and motivation for long term stability and independence with housing - hoping to eventually find a job to help support herself.  Pt spoke highly of her daughter (living with her dad) nearing her highschool graduation and is doing well/makes her proud.

## 2023-02-05 PROCEDURE — 124N000002 HC R&B MH UMMC

## 2023-02-05 PROCEDURE — 250N000013 HC RX MED GY IP 250 OP 250 PS 637: Performed by: PSYCHIATRY & NEUROLOGY

## 2023-02-05 PROCEDURE — G0177 OPPS/PHP; TRAIN & EDUC SERV: HCPCS

## 2023-02-05 RX ADMIN — DIVALPROEX SODIUM 500 MG: 500 TABLET, DELAYED RELEASE ORAL at 19:12

## 2023-02-05 RX ADMIN — MELATONIN TAB 3 MG 3 MG: 3 TAB at 19:12

## 2023-02-05 RX ADMIN — BUSPIRONE HYDROCHLORIDE 10 MG: 10 TABLET ORAL at 19:12

## 2023-02-05 RX ADMIN — NICOTINE POLACRILEX 4 MG: 2 LOZENGE ORAL at 10:28

## 2023-02-05 RX ADMIN — NICOTINE POLACRILEX 4 MG: 2 LOZENGE ORAL at 13:34

## 2023-02-05 RX ADMIN — NICOTINE 1 PATCH: 21 PATCH, EXTENDED RELEASE TRANSDERMAL at 08:08

## 2023-02-05 RX ADMIN — NICOTINE POLACRILEX 4 MG: 2 LOZENGE ORAL at 17:18

## 2023-02-05 RX ADMIN — TRAZODONE HYDROCHLORIDE 50 MG: 50 TABLET ORAL at 20:21

## 2023-02-05 RX ADMIN — BUSPIRONE HYDROCHLORIDE 10 MG: 10 TABLET ORAL at 13:01

## 2023-02-05 RX ADMIN — HYDROXYZINE HYDROCHLORIDE 50 MG: 50 TABLET, FILM COATED ORAL at 10:28

## 2023-02-05 RX ADMIN — PALIPERIDONE 3 MG: 3 TABLET, EXTENDED RELEASE ORAL at 08:08

## 2023-02-05 RX ADMIN — BUSPIRONE HYDROCHLORIDE 10 MG: 10 TABLET ORAL at 08:08

## 2023-02-05 RX ADMIN — DIVALPROEX SODIUM 500 MG: 500 TABLET, DELAYED RELEASE ORAL at 08:08

## 2023-02-05 ASSESSMENT — ACTIVITIES OF DAILY LIVING (ADL)
ADLS_ACUITY_SCORE: 28

## 2023-02-05 NOTE — PROGRESS NOTES
"   02/04/23 1900   Group Therapy Session   Group Attendance attended group session   Time Session Began 1615   Time Session Ended 1700   Total Time (minutes) 45   Total # Attendees 2   Group Type expressive therapy   Group Topic Covered emotions/expression   Patient Response/Contribution cooperative with task     Art Therapy directive was to create a visual personal narrative by painting \"self as a landscape\" using watercolor paints. Pts were encouraged to create imagery to symbolize aspects of self.  Goals of directive: to create a personal self narrative, emotional expression, emotional regulation, identifying personal strengths and goals.  Pt was an engaged participant, focused on task for the full duration of group. Pt finished drawing and briefly shared with group. Pt created a detailed painting of a tree and shared with group her interest in photography and how she likes to photograph trees in particular.   Pts mood was calm, pleasant participant, engaged in group discussion.   "

## 2023-02-05 NOTE — PLAN OF CARE
Goal Outcome Evaluation:    Plan of Care Reviewed With: patient        Patient is alert and oriented, presented with a blunted, flat affect her mood was calm.Patient was present in the lounge watching TV and listening to music on her head phones. Patient is more isolated and withdrawn to herself does not socialize or interact with peers.  She denied SI/SIB/HI and hallucinations. Patient reported feeling better this shift and she attended groups. She is medication compliant, denied having any pain or discomfort. Her appetite was adequate, VS Stable, staff continues to monitor. PRN: Melatonin, Trazodone and Nicotine Lozenge.

## 2023-02-05 NOTE — PLAN OF CARE
"Patient spent majority of the time in her room. She is withdrawn, does not interact with peers. She presented with a flat affect. Mood is calm and anxious at times. Patient reported anxiety which she rated at a 5 out 10 this morning, she received hydroxyzine which was effective. Patient denied SI/SIB/AVH. Patient denied depression. She denied pain. She ate 100% of her meals (both breakfast and lunch.) She is neat and well groomed. She is medication compliant and denied and medication side effects. Goal Outcome.  PRNs given included hydroxyzine and nicotine lozenge a few times.   /82   Pulse 77   Temp 97.8  F (36.6  C) (Oral)   Resp 18   Ht 1.6 m (5' 3\")   Wt 66 kg (145 lb 8 oz)   SpO2 98%   BMI 25.77 kg/m                            "

## 2023-02-06 PROCEDURE — 250N000013 HC RX MED GY IP 250 OP 250 PS 637: Performed by: PSYCHIATRY & NEUROLOGY

## 2023-02-06 PROCEDURE — H2032 ACTIVITY THERAPY, PER 15 MIN: HCPCS

## 2023-02-06 PROCEDURE — 99232 SBSQ HOSP IP/OBS MODERATE 35: CPT | Performed by: NURSE PRACTITIONER

## 2023-02-06 PROCEDURE — G0177 OPPS/PHP; TRAIN & EDUC SERV: HCPCS

## 2023-02-06 PROCEDURE — 124N000002 HC R&B MH UMMC

## 2023-02-06 PROCEDURE — 250N000013 HC RX MED GY IP 250 OP 250 PS 637: Performed by: NURSE PRACTITIONER

## 2023-02-06 RX ADMIN — NICOTINE 1 PATCH: 21 PATCH, EXTENDED RELEASE TRANSDERMAL at 08:03

## 2023-02-06 RX ADMIN — NICOTINE POLACRILEX 4 MG: 2 LOZENGE ORAL at 08:02

## 2023-02-06 RX ADMIN — BUSPIRONE HYDROCHLORIDE 10 MG: 10 TABLET ORAL at 08:02

## 2023-02-06 RX ADMIN — DIVALPROEX SODIUM 500 MG: 500 TABLET, DELAYED RELEASE ORAL at 19:03

## 2023-02-06 RX ADMIN — NICOTINE POLACRILEX 4 MG: 2 LOZENGE ORAL at 14:18

## 2023-02-06 RX ADMIN — HYDROXYZINE HYDROCHLORIDE 50 MG: 50 TABLET, FILM COATED ORAL at 08:02

## 2023-02-06 RX ADMIN — TRAZODONE HYDROCHLORIDE 50 MG: 50 TABLET ORAL at 00:51

## 2023-02-06 RX ADMIN — ACETAMINOPHEN 650 MG: 325 TABLET, FILM COATED ORAL at 11:43

## 2023-02-06 RX ADMIN — PALIPERIDONE 3 MG: 3 TABLET, EXTENDED RELEASE ORAL at 08:02

## 2023-02-06 RX ADMIN — BUSPIRONE HYDROCHLORIDE 10 MG: 10 TABLET ORAL at 13:46

## 2023-02-06 RX ADMIN — NICOTINE POLACRILEX 4 MG: 2 LOZENGE ORAL at 12:58

## 2023-02-06 RX ADMIN — Medication 5 MG: at 19:06

## 2023-02-06 RX ADMIN — BUSPIRONE HYDROCHLORIDE 10 MG: 10 TABLET ORAL at 19:03

## 2023-02-06 RX ADMIN — HYDROXYZINE HYDROCHLORIDE 50 MG: 50 TABLET, FILM COATED ORAL at 12:07

## 2023-02-06 RX ADMIN — DIVALPROEX SODIUM 500 MG: 500 TABLET, DELAYED RELEASE ORAL at 08:02

## 2023-02-06 ASSESSMENT — ACTIVITIES OF DAILY LIVING (ADL)
ADLS_ACUITY_SCORE: 28
HYGIENE/GROOMING: INDEPENDENT
DRESS: SCRUBS (BEHAVIORAL HEALTH)
ADLS_ACUITY_SCORE: 28
ORAL_HYGIENE: INDEPENDENT
ADLS_ACUITY_SCORE: 28

## 2023-02-06 NOTE — PLAN OF CARE
OCCUPATIONAL THERAPY GROUP NOTE:     02/05/23 1950   Group Therapy Session   Group Attendance attended group session   Time Session Began 1600   Time Session Ended 1645   Total Time (minutes) 45   Total # Attendees 2   Group Type task skill   Group Topic Covered structured socialization;leisure exploration/use of leisure time;problem-solving;coping skills/lifestyle management   Group Session Detail OT Clinic Group   Patient Response/Contribution cooperative with task;listened actively;organized;pleasant;engaged;congruent affect   Patient Participation Detail Occupational therapy clinic. Purpose of structured group: exploration/development of positive coping skills, engagement in creative expression and clinical observation of social, cognitive, and kinesthetic performance skills. Pt response: Pt actively participated in occupational therapy clinic. Pt was able to ask for assistance as needed, and independently return to a novel, multi-step, goal-directed task with min assist for task setup and sequencing. Pt demonstrated good focus, planning, and concrete problem solving as needed during task completion. Pt appeared comfortable interacting with peers and writer, and socialized pleasantly throughout her time in clinic.

## 2023-02-06 NOTE — PLAN OF CARE
Goal Outcome Evaluation:    Plan of Care Reviewed With: patient        Patient is alert and oriented, presented with a flat affect her mood was calm but brightened up upon approach. Patient was present in the lounge watching TV and listening to music on her head phones, played cards with select peers, she was more social and interactive this shift .She denied SI/SIB/HI and hallucinations. Patient reported feeling better this shift and she attended groups. Patient reported her anxiety was better this shift, 2/10 and depression 0. She is medication compliant, denied having any pain or discomfort. Her appetite was adequate, VS Stable, staff continues to monitor.   PRN: Melatonin, Trazodone and Nicotine Lozenge x1

## 2023-02-06 NOTE — PLAN OF CARE
OCCUPATIONAL THERAPY GROUP NOTE:     02/03/23 1424   Group Therapy Session   Group Attendance attended group session   Time Session Began 1315   Time Session Ended 1415   Total Time (minutes) 60   Total # Attendees 3   Group Type task skill   Group Topic Covered structured socialization;leisure exploration/use of leisure time;problem-solving;coping skills/lifestyle management   Group Session Detail OT Clinic Group   Patient Response/Contribution cooperative with task;organized;listened actively;pleasant;engaged;congruent affect   Patient Participation Detail Occupational therapy clinic. Purpose of structured group: exploration/development of positive coping skills, engagement in creative expression and clinical observation of social, cognitive, and kinesthetic performance skills. Pt response: Pt actively participated in occupational therapy clinic. Pt was able to ask for assistance as needed, and independently initiate a familiar, multi-step, goal-directed task without difficulty. Pt demonstrated good focus, planning, and concrete problem solving as needed during task completion. Pt appeared comfortable interacting with peers and writer, and socialized pleasantly about her cats during her time in group.

## 2023-02-06 NOTE — PROGRESS NOTES
"PSYCHIATRY  PROGRESS NOTE     DATE OF SERVICE   02/06/2023           CHIEF COMPLAINT   \"I just have a lot of questions about discharge\"       SUBJECTIVE   Nursing reports: Patient is alert and oriented, presented with a flat affect her mood was calm but brightened up upon approach. Patient was present in the lounge watching TV and listening to music on her head phones, played cards with select peers, she was more social and interactive this shift .She denied SI/SIB/HI and hallucinations. Patient reported feeling better this shift and she attended groups. Patient reported her anxiety was better this shift, 2/10 and depression 0. She is medication compliant, denied having any pain or discomfort. Her appetite was adequate, VS Stable, staff continues to monitor.   PRN: Melatonin, Trazodone and Nicotine Lozenge x1     reports: Working with  for discharge options: GRH/hotel vs MICD tx        OBJECTIVE   Upon patient interview, patient reports that she feels her medication regimen is controlling symptoms well.  Patient does however report some feelings of anxiety related to lack of definitive plan for treatment/housing following discharge.  Patient denies suicidal/homicidal ideation/intent/plan.  Patient denies cravings for illicit substances.  Patient appears calm with organized thought process.  Patient is future oriented stating that she would like to return to group home/hotel as she found it very helpful with plans to get her resume completed and get help with food stamps as well as attend PHP/IOP.  Patient attends group programming and is visible in the milieu.  Denies concerns with nutrition.  Patient did request increased dose of trazodone for frequent awakenings during the night.  Denies psychotic symptoms/none observed.       MEDICATIONS   Medications:  Scheduled Meds:    busPIRone  10 mg Oral TID     divalproex sodium delayed-release  500 mg Oral Q12H     nicotine  1 patch " "Transdermal Daily     nicotine   Transdermal Q8H     paliperidone  3 mg Oral Daily     Continuous Infusions:  PRN Meds:.acetaminophen, alum & mag hydroxide-simethicone, hydrOXYzine, melatonin, nicotine, OLANZapine **OR** OLANZapine, senna-docusate, traZODone    Medication adherence issues: MS Med Adherence Y/N: No  Medication side effects: MEDICATION SIDE EFFECTS: no side effects reported  Benefit: Yes / No: Yes       ROS   Pertinent items are noted in HPI.       MENTAL STATUS EXAM   Vitals: BP 99/56 (BP Location: Left arm, Patient Position: Sitting, Cuff Size: Adult Regular)   Pulse 97   Temp 97.2  F (36.2  C) (Oral)   Resp 16   Ht 1.6 m (5' 3\")   Wt 66 kg (145 lb 8 oz)   SpO2 100%   BMI 25.77 kg/m      Appearance:  No apparent distress  Mood:  {Mood: Anxious   Affect: full range  was congruent to speech  Suicidal Ideation: PRESENT / ABSENT: absent   Homicidal Ideation: PRESENT / ABSENT: absent   Thought process: unremarkable   Thought content: devoid of  suicidal ideation and delusions.   Fund of Knowledge: Average  Attention/Concentration: Normal  Language ability:  Intact  Memory:  Immediate recall intact  Insight:  good.  Judgement: good  Orientation: Yes, x4  Psychomotor Behavior: normal or unremarkable    Muscle Strength and Tone: MuscleStrength: Normal  Gait and Station: Normal       LABS   personally reviewed.     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 Jacob MD dated 2/2/23:     1) Continue Invega, VPA and Buspar. VPA level was 90.9  2) Patient admitted under revoked PD. Committed with [facility].   3) CTC to coordinate disposition with [facility] .      Disposition Plan   Reason for ongoing admission: is unable to care for self due to severe psychosis or lacie  Discharge location: TBD  Discharge Medications: not ordered  Follow-up Appointments: not scheduled  Legal Status: full commitment      Psychiatric coverage provided by CLAUDE Patel CNP on 2/6/2023 at 9:31 AM    1. Ongoing education given regarding diagnostic and treatment options with risks, benefits and alternatives and adequate verbalization of understanding.    2. Psychiatric treatments/interventions:       Increase trazodone to 75 mg nightly as needed  3. Medical treatments/interventions: No new orders  4. Laboratory/Imaging: No new orders  5. Legal Status: Civil commitment  6. Disposition Plan: Return to group home/hotel with outpatient IOP PHP versus inpatient MICD treatment              Risk Assessment: IP MHAC RISK ASSESSMENT: Patient able to contract for safety    Coordination of Care:   Treatment Plan reviewed and physician signed, Care discussed with Care/Treatment Team Members, Chart reviewed and Patient seen      Re-Certification I certify that the inpatient psychiatric facility services furnished since the previous certification were, and continue to be, medically necessary for, either, treatment which could reasonably be expected to improve the patient s condition or diagnostic study and that the hospital records indicate that the services furnished were, either, intensive treatment services, admission and related services necessary for diagnostic study, or equivalent services.     I certify that the patient  continues to need, on a daily basis, active treatment furnished directly by or requiring the supervision of inpatient psychiatric facility personnel.   I estimate 5-7 days of hospitalization is necessary for proper treatment of the patient. My plans for post-hospital care for this patient are  group home vs MICD t/x      CLAUDE Patel CNP    -     02/06/2023  -     9:29 AM    Total time  45 minutes with > 50%spent on coordination of cares and psycho-education.    This note was created with help of Dragon dictation system. Grammatical / typing errors are not intentional.    CLAUDE Patel CNP

## 2023-02-06 NOTE — PLAN OF CARE
Assessment/Intervention/Current Symtoms and Care Coordination:  -Chart review  -Team meeting - team discussed cares and that writer will be reaching out to outpatient  again today to help inform discharge plan. Christina has been accepting of medications and hopeful to return to [facility], though she was not successful in following through with treatment recommendations after last hospitalization.   -Writer received response from Vero stating Christina has been difficult to work with while she resides in the community, solely because she does not have a cell phone. She says they only met once and in the beginning of December and Christina did not have a cell phone to join the psychiatry appointment virtually, so they called and scheduled it for in-person but she still failed to show. Vero says Christina stopped communicating with her around [place] and she did not complete any CD treatment after her last hospitalization. Vero says the  at the [facility] program told her Christina has lost her spot there because of the hospitalization. Vero recommends CD treatment and IRTS. Writer asked whether there is a program through [facility] to help Christina obtain a cell phone and was told there is not a specific program through [facility], but they typically go through [employer] for a [employer] issued phone. She believes Christina would qualify based on her income status. Vero says the [facility] plans to bag Christina's items and place them in their storage unit at the [facility] for 30 days. She says she could work on IRTS referrals with Christina.   -Writer met with Christina to provide update. She stated she understands she is not able to return to [facility] and is grateful they will hold onto her belongings. She says she's been in the hospital for 2 weeks now and needs to know more about the plan for discharge. She requested to talk on the phone with Vero and  and attempted calling but was only able to  leave voicemail. Christina is hoping to be able to discharge to her ex-'s home in [place]. Writer indicated with civil commitment in [place], this may limit what team supports for placement. Christina did agree for IRTS referrals in [place] to be made while waiting for input from [facility], though requests that she be able to smoke at the IRTS.   -Writer provided update to Vero and asked whether she was able to reconnect with Christina.   -Writer will discuss recommendations for IRTS versus MICD treatment with team and make referrals as appropriate.    Current Symptoms include the following: anxious  Precautions: Elopement and Cheeking     Discharge Plan or Goal:  Pending stabilization & development of a safe discharge plan.  Considerations include: IRTS or MICD treatment     Barriers to Discharge:  Christina presents following suspected medication non-adherence and manic symptoms. Her provisional discharge has been revoked by her mental health . She requires symptom stabilization, medication management, and supportive discharge plan.      Referral Status:  Referrals TBD (will need to establish with psychiatry, therapy, and PCP. Also seeking dental referral but willing to work with outpatient  to obtain this).     Legal Status:  Patient is under an MICD Commitment in [facility]   Case No. [id number]     Contacts:    Brother - Santo Torres (phone: [phone])    [facility]  - Vero De Leon (phone: [phone], email: [email or web address].)     Upcoming Meetings/Important Dates:  N/A     Rationale for SIO/No Roommate Order:  Patient is not on SIO.  Patient does not have current roommate. There is potential for this patient to have roommate. (No roommate order discontinued on 2/1/2023).

## 2023-02-06 NOTE — PLAN OF CARE
"Patient is alert and oriented x4. Mood is calm. Affect is flat but brightens upon approach. Patient was in and out of her room. She walked in the hallway a few times with headphones on. She attended OT group. Patient is socially withdrawn from peers. She denied SI/SIB/AVH. She endorsed high anxiety, received hydroxyzine x2 this shift. Patient also complained of right hip pain, tylenol and hot pack utilized with good relief. She was also provided with soft care mattress to try on her bed. Patient is eating and drinking adequately. Grooming is adequate. Staff will continue to monitor.BP 99/56 (BP Location: Left arm, Patient Position: Sitting, Cuff Size: Adult Regular)   Pulse 97   Temp 97.2  F (36.2  C) (Oral)   Resp 16   Ht 1.6 m (5' 3\")   Wt 66 kg (145 lb 8 oz)   SpO2 100%   BMI 25.77 kg/m                            "

## 2023-02-06 NOTE — PLAN OF CARE
Goal Outcome Evaluation:    Plan of Care Reviewed With: patient        Patient was in bed at the beginning of the shift, breathing quietly and unlabored.     Pt slept 6.5 hrs  No concerns reported or noted this shift.  Will continue to Monitor.  PRNs: Trazodone

## 2023-02-07 PROCEDURE — 250N000013 HC RX MED GY IP 250 OP 250 PS 637: Performed by: NURSE PRACTITIONER

## 2023-02-07 PROCEDURE — 250N000013 HC RX MED GY IP 250 OP 250 PS 637: Performed by: PSYCHIATRY & NEUROLOGY

## 2023-02-07 PROCEDURE — 99232 SBSQ HOSP IP/OBS MODERATE 35: CPT | Performed by: NURSE PRACTITIONER

## 2023-02-07 PROCEDURE — G0177 OPPS/PHP; TRAIN & EDUC SERV: HCPCS

## 2023-02-07 PROCEDURE — 82040 ASSAY OF SERUM ALBUMIN: CPT | Performed by: PSYCHIATRY & NEUROLOGY

## 2023-02-07 PROCEDURE — 87389 HIV-1 AG W/HIV-1&-2 AB AG IA: CPT | Performed by: NURSE PRACTITIONER

## 2023-02-07 PROCEDURE — 124N000002 HC R&B MH UMMC

## 2023-02-07 PROCEDURE — 87591 N.GONORRHOEAE DNA AMP PROB: CPT | Performed by: NURSE PRACTITIONER

## 2023-02-07 PROCEDURE — 87491 CHLMYD TRACH DNA AMP PROBE: CPT | Performed by: NURSE PRACTITIONER

## 2023-02-07 PROCEDURE — 36415 COLL VENOUS BLD VENIPUNCTURE: CPT | Performed by: NURSE PRACTITIONER

## 2023-02-07 RX ADMIN — DIVALPROEX SODIUM 500 MG: 500 TABLET, DELAYED RELEASE ORAL at 19:04

## 2023-02-07 RX ADMIN — NICOTINE 1 PATCH: 21 PATCH, EXTENDED RELEASE TRANSDERMAL at 08:32

## 2023-02-07 RX ADMIN — BUSPIRONE HYDROCHLORIDE 10 MG: 10 TABLET ORAL at 13:03

## 2023-02-07 RX ADMIN — NICOTINE POLACRILEX 4 MG: 2 LOZENGE ORAL at 08:36

## 2023-02-07 RX ADMIN — DIVALPROEX SODIUM 500 MG: 500 TABLET, DELAYED RELEASE ORAL at 08:31

## 2023-02-07 RX ADMIN — BUSPIRONE HYDROCHLORIDE 10 MG: 10 TABLET ORAL at 19:04

## 2023-02-07 RX ADMIN — Medication 5 MG: at 19:23

## 2023-02-07 RX ADMIN — NICOTINE POLACRILEX 4 MG: 2 LOZENGE ORAL at 19:23

## 2023-02-07 RX ADMIN — BUSPIRONE HYDROCHLORIDE 10 MG: 10 TABLET ORAL at 08:32

## 2023-02-07 RX ADMIN — ACETAMINOPHEN 650 MG: 325 TABLET, FILM COATED ORAL at 16:51

## 2023-02-07 RX ADMIN — NICOTINE POLACRILEX 4 MG: 2 LOZENGE ORAL at 13:02

## 2023-02-07 RX ADMIN — NICOTINE POLACRILEX 4 MG: 2 LOZENGE ORAL at 16:53

## 2023-02-07 RX ADMIN — OLANZAPINE 10 MG: 10 TABLET, FILM COATED ORAL at 11:10

## 2023-02-07 RX ADMIN — HYDROXYZINE HYDROCHLORIDE 50 MG: 50 TABLET, FILM COATED ORAL at 08:35

## 2023-02-07 RX ADMIN — PALIPERIDONE 3 MG: 3 TABLET, EXTENDED RELEASE ORAL at 08:32

## 2023-02-07 ASSESSMENT — ACTIVITIES OF DAILY LIVING (ADL)
ADLS_ACUITY_SCORE: 28
ORAL_HYGIENE: INDEPENDENT
HYGIENE/GROOMING: INDEPENDENT
ADLS_ACUITY_SCORE: 28

## 2023-02-07 NOTE — PLAN OF CARE
02/06/23 1857   Group Therapy Session   Group Attendance attended group session   Time Session Began 1045   Time Session Ended 1145   Total Time (minutes) 60   Total # Attendees 4   Group Type task skill   Group Topic Covered structured socialization;leisure exploration/use of leisure time;problem-solving;coping skills/lifestyle management   Group Session Detail OT Clinic Group   Patient Response/Contribution cooperative with task;listened actively;organized;pleasant;engaged;congruent affect   Patient Participation Detail Occupational therapy clinic. Purpose of structured group: exploration/development of positive coping skills, engagement in creative expression and clinical observation of social, cognitive, and kinesthetic performance skills. Pt response: Pt actively participated in occupational therapy clinic. Pt was able to ask for assistance as needed, and independently return to and complete a novel, goal-directed task with min assist for task organization. Pt demonstrated good focus, and was able to plan and problem solve with occasional assistance as needed during task completion. Pt appeared comfortable interacting with peers and writer, and socialized pleasantly during her time in clinic.

## 2023-02-07 NOTE — PLAN OF CARE
Problem: Anxiety Signs/Symptoms  Goal: Improved Somatic Symptoms (Anxiety Signs/Symptoms)  Outcome: Progressing   Goal Outcome Evaluation:    Plan of Care Reviewed With: patient      Pt denies all psych symptoms except that she endorsed anxiety at 2/10 and she said that her anxiety is due to her thinking of going home.  She spent the most part of her time out on the unit and she was sometimes pacing and listening to music on head-phones.  Pt was med compliant and she had a good appetite.  Pt went to bed early before 2000.

## 2023-02-07 NOTE — PLAN OF CARE
[facility] [address]  Occupational Therapy Behavioral Health Assessment    Patient Name: Christina Vásquez    Description: OT staff met with pt to review the role of occupational therapy and explain the value of having them involved in their treatment plan including options to meet current needs/self-identified goals. The below evaluation is a compilation of functional performance observation and information obtained from an OT self-assessment.     Clinical impression through direct observation:      02/02/23 1300   General Information   Date Initially Attended OT 02/02/23   Clinical Impression   Affect Appropriate to situation   Orientation Oriented to person, place and time   Appearance and ADLs General cleanliness observed in most areas   Attention to Internal Stimuli No observed signs   Interaction Skills Initiates appropriately with staff;Interacts appropriately with staff;Interacts appropriately with peers;Initiates appropriately with peers   Ability to Communicate Needs Independent   Verbal Content Clear;Appropriate to topic   Ability to Maintain Boundaries Maintains appropriate physical boundaries;Maintains appropriate verbal boundaries   Participation Initiates participation;Independently participates   Concentration Concentrates 30+ minutes   Ability to Concentrate With structure;With refocus   Follows and Comprehends Directions Independently follows multi-step directions   Memory Delayed and immediate recall intact   Organization Independently organizes medium tasks   Decision Making Independent   Planning and Problem Solving Occasionally needs assist/feedback   Ability to Apply and Learn Concepts Applies within group structure   Frustrations / Stress Tolerance Independently identifies skills ;Utilizes coping skills with prompts   Level of Insight Identifies needs with structure/support   Self Esteem Takes risks with support and encouragement;Accepts positive feedback   Social Supports Has knowledge of support  systems   General Observation/Plan   General Observations/Plan Coping and relapse prevention   BEH OT Care Plan Goals   OT Care Plan relapse prevention   Practice using coping strategies to manage stress and reduce symptoms Pt will practice using >2 coping strategies to manage stress and reduce symptoms to demonstrate increased readiness for discharge.    Participate in healthy, meaningful leisure activity Within 1 week, Pt will participate in >2 healthy, meaningful leisure activities for increased social participation.    Engage in OT group activities that support recovery Pt will demonstrate consistent engagement in OT group activities that support recovery as evidenced by participating in >1 scheduled OT group/day for 5 days.          Patient indicated success in: (Bolded items indicate activities the pt selected)   Time spent with family or friends  Relaxing and enjoying myself  Having a satisfying routine  Work or volunteering   Concentrating on my tasks     Living independently  Taking care of the place where I live  Transportation  Managing my finances  Managing my basic needs (food, medicine, sleep)  Learning new things  Ability to pursue my goals  Other:    Patient indicated barriers to success are: (Bolded items indicate activities the pt selected)   Difficulty concentrating  Memory problems  Physical health/Chronic Pain  Lacks support (emotional/physical/spiritual)  Motivation/mood  Finances  Using drugs or alcohol  Sleeping too much or not enough  Missing work/appointments  Bothered by lights or sounds in the environment  Bothered by touch, texture, or movement  Lack of satisfying daily routine   Living environment  Transportation  Other:          Patient indicated these supports: (Bolded items indicate activities the pt selected)   Safe place to live  Family, friends or caregivers to help out when needed  A best friend or significant other  Pets  Belief in myself and abilities  Routines and rituals that  "support my wellness  Access to email, social media, phone calls  Leisure supplies to support my interests and hobbies  Professionals: , Therapist, etc.  Other:      Patient identified these emotional, physical or mental health concerns: \"need a schedule pad or reminders\"    Patient axel with these concerns: \"writing them over and over\"    Patient enjoys spending time with: \"ex , kids\"    Identified enjoyment in activities: \"crafts\"    Stressors or changes in the past year: \"whole life\"    Patient identified values: \"trustworthy\"    Patient's goal for the future is \"settled in one place\"    Goals selected by patient to work on with OT: (Bolded items indicate activities the pt selected)   Have hope for the future  Feel better  Learn ways to stay well and avoid coming to the hospital  Share my thoughts and feelings  Feel more confident  Improve my sleep  Improve my concentration  Relax and enjoy myself  Improve my relationships with others  Handle my frustrations  Develop a satisfying daily routine  Manage my physical pain  Explore use of sensory coping strategies  Other:     Assessment: Patient would benefit from continue engagement in OT groups that support healthy recovery, specifically exploration of positive coping skills for symptom management/relapse prevention.    Plan: Initiate care plan goals and interventions.    Patient participated in goal(s) selection and understands the plan of care: Yes    Plan for Next Treatment: Provide graded occupation-based activities for increased success and ongoing assessment.     Andreina Shay, OT on 2/6/2023 at 7:03 PM    "

## 2023-02-07 NOTE — PROGRESS NOTES
02/06/23 1900   Group Therapy Session   Group Attendance attended group session   Time Session Began 1600   Time Session Ended 1645   Total Time (minutes) 45   Total # Attendees 2   Group Type recreation   Group Topic Covered leisure exploration/use of leisure time   Group Session Detail TR leisure group   Patient Response/Contribution cooperative with task   Patient Participation Detail Pt actively participated in a structured Therapeutic Recreation group with a focus on leisure participation, stress reduction, and social engagement via an active group game. Pt remained focused and engaged throughout full duration of group.  Pt mood was calm and was appropriate with interactions.  Appropriately shared sense of humor with peers during the group and appeared to brighten with social interaction. Pt did make a few brief comments about alcohol and said she will always want to have some drinks.

## 2023-02-07 NOTE — PLAN OF CARE
"  Problem: Adult Behavioral Health Plan of Care  Goal: Plan of Care Review  Outcome: Progressing  Goal: Patient-Specific Goal (Individualization)  Description: You can add care plan individualizations to a care plan. Examples of Individualization might be:  \"Parent requests to be called daily at 9am for status\", \"I have a hard time hearing out of my right ear\", or \"Do not touch me to wake me up as it startles me\".  Outcome: Progressing  Goal: Individualized Daily Interaction Plan (IDIP)  Outcome: Progressing  Goal: Adheres to Safety Considerations for Self and Others  Outcome: Progressing  Goal: Absence of New-Onset Illness or Injury  Outcome: Progressing  Goal: Optimized Coping Skills in Response to Life Stressors  Outcome: Progressing  Goal: Develops/Participates in Therapeutic [place] to Support Successful Transition  Outcome: Progressing   Goal Outcome Evaluation:       /72 (BP Location: Left arm)   Pulse 82   Temp 97.2  F (36.2  C) (Oral)   Resp 16   Ht 1.6 m (5' 3\")   Wt 66.3 kg (146 lb 3.2 oz)   SpO2 97%   BMI 25.90 kg/m    Patient is alert and oriented x4 Patient attended OT group this morning. Patient presented with a sad, anxious mood. Affect is flat. Patient reported being upset with her [place] CM, stated they had a phone conversation and it didn't sit well with her. Patient reported \"they want me to sit here for 3-4 moths doing what? I have been in my best behavior ever since I came here, I just ran out of gas, I can go live with my brother, I need to go get my stuff and place them in storage, I need to go file my taxes. I feel like I am being punished.\" Patient was redirected to talk to her CTC to find out about her discharge plan. Patient is medication compliant. Anxiety is very high today, she received hydroxyzine and also Zyprexa. She denied IS/SIB/AVH. She denied pain. Patient is medication compliant. Patient ate 100% of both meals. Urine specimen collected for lab ordered today.       "

## 2023-02-07 NOTE — PLAN OF CARE
Assessment/Intervention/Current Symtoms and Care Coordination:  -Chart review  -Writer received update from Vero stating will need to meet with Christina to get the cell phone started as it appears she must create an account with an email. Vero says she has discussed a potential plan for returning to Christina's ex-'s home in [place], but Vero spoke with him (Pieter) and he stated Mary is not allowed to return to living with him due to safety concerns. Vero says she just spoke with Christina this morning and she is very adamant about being discharged and wanting out of the hospital. She is open to an IRTS as long as she can smoke, and Vero will make referrals to the 3 IRTS in [facility] ([facility], [place] and [place]) today. She says Christina is pleading to be allowed to discharge to her brother s, but Vero says she doesn t feel this is a safe option as he also engages in drug use. Vero supports seeing Christina be placed in a treatment facility.   -Psych associate states Christina is requesting for her emergency contacts to be updated to exclude her ex- and her daughter. She is agreeable to having her brother listed. Christina is hoping to talk about discharge ASAP.   -Team meeting - team discussed concern for safety and stability if discharged to a location that is not treatment. Writer discussed Christina's 's stance on this as well. IRTS referrals are being initiated.   -Writer met with Christina and she said she needs an update that is very specific regarding length of time to expect in the hospital. Writer provided update about IRTS referrals and she indicated she needs to know how long before she can stay in the hospital. Writer stated this is dependent on openings available and whether she is approved. Christina stated she wanted to discharge to her brother's house then and writer explained this does not seem to be a supportive plan and her  is not in agreement with it. Christina walked away  "from writer while saying \"then I'll just commit suicide!\" Psych associate spoke with Christina and she indicated she would never actually attempt to kill herself as she has too much to live for and says that type of statement when she becomes angry.   -Writer submitted 4 IRTS referrals as listed below.   Current Symptoms include the following: patient is irritable and anxious  Precautions: Elopement and Cheeking     Discharge Plan or Goal:  Pending stabilization & development of a safe discharge plan.  Considerations include: IRTS or MICD treatment     Barriers to Discharge:  Christina presents following suspected medication non-adherence and manic symptoms. Her provisional discharge has been revoked by her mental health . She requires symptom stabilization, medication management, and supportive discharge plan.      Referral Status:  Referrals TBD (will need to establish with psychiatry, therapy, and PCP. Also seeking dental referral but willing to work with outpatient  to obtain this).    IRTS Referrals (submitted by Vero De Leon on 2/7/2023)    [facility]    IRTS Referrals (submitted by writer on 2/7/2023)    [facility]    [facility]     Legal Status:  Patient is under an MICD Commitment [facility]   Case No. [id number]     Contacts:    Brother - Santo Torres (phone: [phone])    [facility]  - Vero De Leon (phone: [phone], email: [email or web address])     Upcoming Meetings/Important Dates:  N/A     Rationale for SIO/No Roommate Order:  Patient is not on SIO.  Patient does not have current roommate. There is potential for this patient to have roommate. (No roommate order discontinued on 2/1/2023).                         "

## 2023-02-07 NOTE — PROGRESS NOTES
"PSYCHIATRY  PROGRESS NOTE     DATE OF SERVICE   02/07/2023           CHIEF COMPLAINT   \"I just have a lot of questions about discharge\"       SUBJECTIVE   Nursing reports: Pt approached writer asking if her ex- (Pieter) and daughter (Wendy) can be immediately removed from her emergency contact list. Writer checked and it appears pt only has 1 listed emergency contact, her brother Milind/Santo, whom she wants to remain as her emergency contact. Writer did not see YESI for either Pieter or Wendy in paper or electronic chart at this time.      reports: Continue with IRTS referrals and re-establish outpatient providers.       OBJECTIVE   Upon patient interview, patient was tearful expressing frustration regarding continued length of stay.  Patient states she needs to complete taxes in order to get tax return to pay and get her car out of impound.  Patient did accept explanation that due to her legal status her  and treatment team need to be in agreement with her discharge plan and that the goal is to promote success and continued stability.  Patient understands that she is in the process of being considered at IRTS facilities.  Patient feels comfortable with medication regimen, denies side effects.  None observed.  Patient feels motivated for the future and continued sobriety.  Patient voiced frustration with recent decompensation related to her inability to obtain psychotropic medications.  Denies suicidal/homicidal ideation/intent/plan.  Denies psychotic symptoms/none observed.  Patient voiced many concerns regarding her sexual health and that her ex significant other has been telling her that \"she has HIV and every other STD from him\".  Labs ordered.  Patient also has concern regarding a skin lesion in her genital area.  Patient states that this lesion has been there for various years without any changes.  And that it has been assessed by physicians in the past without concern.   " "    MEDICATIONS   Medications:  Scheduled Meds:    busPIRone  10 mg Oral TID     divalproex sodium delayed-release  500 mg Oral Q12H     nicotine  1 patch Transdermal Daily     nicotine   Transdermal Q8H     paliperidone  3 mg Oral Daily     Continuous Infusions:  PRN Meds:.acetaminophen, alum & mag hydroxide-simethicone, hydrOXYzine, melatonin, nicotine, OLANZapine **OR** OLANZapine, senna-docusate, traZODone    Medication adherence issues: MS Med Adherence Y/N: No  Medication side effects: MEDICATION SIDE EFFECTS: no side effects reported  Benefit: Yes / No: Yes       ROS   Pertinent items are noted in HPI.       MENTAL STATUS EXAM   Vitals: /72 (BP Location: Left arm)   Pulse 82   Temp 97.2  F (36.2  C) (Oral)   Resp 16   Ht 1.6 m (5' 3\")   Wt 66.3 kg (146 lb 3.2 oz)   SpO2 97%   BMI 25.90 kg/m      Appearance:  Tearful, anxious  Mood:  {Mood: Anxious   Affect: full range  was congruent to speech  Suicidal Ideation: PRESENT / ABSENT: absent   Homicidal Ideation: PRESENT / ABSENT: absent   Thought process: unremarkable linear  Thought content: devoid of  suicidal ideation and delusions.   Fund of Knowledge: Average  Attention/Concentration: Normal  Language ability:  Intact  Memory:  Immediate recall intact  Insight:  good.  Judgement: good  Orientation: Yes, x4  Psychomotor Behavior: normal or unremarkable    Muscle Strength and Tone: MuscleStrength: Normal  Gait and Station: Normal       LABS   personally reviewed.     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 Jacob MD dated 2/2/23:     1) Continue Invega, VPA and Buspar. VPA level was 90.9  2) Patient admitted under revoked PD. Committed with [facility].   3) CTC to coordinate disposition with [facility] .      Disposition Plan   Reason for ongoing admission: is unable to care for self due to severe psychosis or lacie  Discharge location: TBD  Discharge Medications: not ordered  Follow-up Appointments: not scheduled  Legal Status: full commitment      Psychiatric coverage provided by CLAUDE Patel CNP on 2/6/2023 at 9:31 AM    1. Ongoing education given regarding diagnostic and treatment options with risks, benefits and alternatives and adequate verbalization of understanding.    2. Psychiatric treatments/interventions:       Increase trazodone to 75 mg nightly as needed  3. Medical treatments/interventions: No new orders      2/7/23: STD urine screen and HIV serum.   4. Laboratory/Imaging: No new orders  5. Legal Status: Civil commitment  6. Disposition Plan: Return to group home/hotel with outpatient IOP PHP versus inpatient MICD treatment              Risk Assessment: IP [facility] RISK ASSESSMENT: Patient able to contract for safety    Coordination of Care:   Treatment Plan reviewed and physician signed, Care discussed with Care/Treatment Team Members, Chart reviewed and Patient seen      Re-Certification I certify that the inpatient psychiatric facility services furnished since the previous certification were, and continue to be, medically necessary for, either, treatment which could reasonably be expected to improve the patient s condition or diagnostic study and that the hospital records indicate that the services furnished were, either,  intensive treatment services, admission and related services necessary for diagnostic study, or equivalent services.     I certify that the patient continues to need, on a daily basis, active treatment furnished directly by or requiring the supervision of inpatient psychiatric facility personnel.   I estimate 5-7 days of hospitalization is necessary for proper treatment of the patient. My plans for post-hospital care for this patient are  group home vs MICD t/x      CLAUDE Patel CNP    -     02/07/2023  -     12:36 PM    Total time  45 minutes with > 50%spent on coordination of cares and psycho-education.    This note was created with help of Dragon dictation system. Grammatical / typing errors are not intentional.    CLAUDE Patel CNP

## 2023-02-07 NOTE — PLAN OF CARE
02/07/23 1344   Behavioral Team Discussion   Participants CLAUDE Cochran, CHAVA; Maegan Turcios, RN; JOZEF Teran   Progress Some improvement   Anticipated length of stay 10-14 days   Continued Stay Criteria/Rationale Christina presents following suspected medication non-adherence and manic symptoms. Her provisional discharge has been revoked by her mental health . She requires symptom stabilization, medication management, and supportive discharge plan.   Precautions Elopement and Cheeking   Plan Continue with IRTS referrals and re-establish outpatient providers.   Anticipated Discharge Disposition substance use treatment;IRTS

## 2023-02-07 NOTE — PLAN OF CARE
OCCUPATIONAL THERAPY GROUP NOTE:     02/06/23 1859   Group Therapy Session   Time Session Began 1315   Time Session Ended 1400   Total Time (minutes) 45   Total # Attendees 4   Group Type recreation   Group Topic Covered structured socialization;self-care activities;leisure exploration/use of leisure time;problem-solving   Group Session Detail OT Leisure Group   Patient Response/Contribution cooperative with task;listened actively;organized;pleasant;engaged;congruent affect   Patient Participation Detail Pt actively participated in a structured occupational therapy group with a focus on a visual-spatial leisure task. Pt was able to follow 2-step directions of the novel task, and demonstrated concrete planning and problem solving throughout the task. Pt remained focused and engaged for the full duration of group, and demonstrated her sense of humor with peers throughout the activity. She was observed to assist/support peers as needed throughout this group.

## 2023-02-07 NOTE — PROGRESS NOTES
Pt approached writer asking if her ex- (Pieter) and daughter (Wendy) can be immediately removed from her emergency contact list. Writer checked and it appears pt only has 1 listed emergency contact, her brother Milind/Santo, whom she wants to remain as her emergency contact. Writer did not see YESI for either Pieter or Wendy in paper or electronic chart at this time.

## 2023-02-07 NOTE — PLAN OF CARE
Patient appeared asleep for 6.25hrs during the night shift. Patient did not have any behavioral or medical concerns. Patient remains on 15min checks. RN will continue to monitor.

## 2023-02-08 LAB
C TRACH DNA SPEC QL PROBE+SIG AMP: NEGATIVE
HIV 1+2 AB+HIV1 P24 AG SERPL QL IA: NONREACTIVE
N GONORRHOEA DNA SPEC QL NAA+PROBE: NEGATIVE

## 2023-02-08 PROCEDURE — 250N000013 HC RX MED GY IP 250 OP 250 PS 637: Performed by: PSYCHIATRY & NEUROLOGY

## 2023-02-08 PROCEDURE — H2032 ACTIVITY THERAPY, PER 15 MIN: HCPCS

## 2023-02-08 PROCEDURE — 99232 SBSQ HOSP IP/OBS MODERATE 35: CPT | Performed by: NURSE PRACTITIONER

## 2023-02-08 PROCEDURE — 124N000002 HC R&B MH UMMC

## 2023-02-08 PROCEDURE — 250N000013 HC RX MED GY IP 250 OP 250 PS 637: Performed by: NURSE PRACTITIONER

## 2023-02-08 PROCEDURE — 90853 GROUP PSYCHOTHERAPY: CPT

## 2023-02-08 RX ORDER — CALCIUM CARBONATE 500 MG/1
500 TABLET, CHEWABLE ORAL 3 TIMES DAILY PRN
Status: DISCONTINUED | OUTPATIENT
Start: 2023-02-08 | End: 2023-02-15 | Stop reason: HOSPADM

## 2023-02-08 RX ADMIN — NICOTINE POLACRILEX 4 MG: 2 LOZENGE ORAL at 08:35

## 2023-02-08 RX ADMIN — HYDROXYZINE HYDROCHLORIDE 50 MG: 50 TABLET, FILM COATED ORAL at 12:28

## 2023-02-08 RX ADMIN — Medication 75 MG: at 19:03

## 2023-02-08 RX ADMIN — PALIPERIDONE 3 MG: 3 TABLET, EXTENDED RELEASE ORAL at 08:34

## 2023-02-08 RX ADMIN — Medication 5 MG: at 19:51

## 2023-02-08 RX ADMIN — NICOTINE 1 PATCH: 21 PATCH, EXTENDED RELEASE TRANSDERMAL at 08:37

## 2023-02-08 RX ADMIN — CALCIUM CARBONATE (ANTACID) CHEW TAB 500 MG 500 MG: 500 CHEW TAB at 16:10

## 2023-02-08 RX ADMIN — ACETAMINOPHEN 650 MG: 325 TABLET, FILM COATED ORAL at 08:34

## 2023-02-08 RX ADMIN — NICOTINE POLACRILEX 4 MG: 2 LOZENGE ORAL at 17:27

## 2023-02-08 RX ADMIN — BUSPIRONE HYDROCHLORIDE 10 MG: 10 TABLET ORAL at 19:02

## 2023-02-08 RX ADMIN — NICOTINE POLACRILEX 4 MG: 2 LOZENGE ORAL at 14:04

## 2023-02-08 RX ADMIN — DIVALPROEX SODIUM 500 MG: 500 TABLET, DELAYED RELEASE ORAL at 19:02

## 2023-02-08 RX ADMIN — HYDROXYZINE HYDROCHLORIDE 50 MG: 50 TABLET, FILM COATED ORAL at 08:35

## 2023-02-08 RX ADMIN — BUSPIRONE HYDROCHLORIDE 10 MG: 10 TABLET ORAL at 08:34

## 2023-02-08 RX ADMIN — CALCIUM CARBONATE (ANTACID) CHEW TAB 500 MG 500 MG: 500 CHEW TAB at 18:46

## 2023-02-08 RX ADMIN — DIVALPROEX SODIUM 500 MG: 500 TABLET, DELAYED RELEASE ORAL at 08:34

## 2023-02-08 RX ADMIN — BUSPIRONE HYDROCHLORIDE 10 MG: 10 TABLET ORAL at 13:25

## 2023-02-08 ASSESSMENT — ACTIVITIES OF DAILY LIVING (ADL)
ADLS_ACUITY_SCORE: 28
ADLS_ACUITY_SCORE: 28
ORAL_HYGIENE: INDEPENDENT
ADLS_ACUITY_SCORE: 28
ADLS_ACUITY_SCORE: 28
HYGIENE/GROOMING: INDEPENDENT
ADLS_ACUITY_SCORE: 28
ADLS_ACUITY_SCORE: 28
LAUNDRY: WITH SUPERVISION
ADLS_ACUITY_SCORE: 28
ADLS_ACUITY_SCORE: 28
DRESS: INDEPENDENT
ADLS_ACUITY_SCORE: 28

## 2023-02-08 NOTE — PLAN OF CARE
Patient is alert and oriented x4. She is calm and cooperative. She attended OT group this morning, is withdrawn, does not socialize with peers. Patient is medication compliant. Anxiety is very high today, she received hydroxyzine x2.  She denied SI/SIB/AVH. She contracts for safety. Patient is medication compliant. Patient ate 100% of both meals. Patient  Orthostatic BP was 136/82 sitting and 102/69 standing (orthosatic BP drop of 34) after recheck BP was 103/71 sitting and 108/72 standing. Patient  is well groomed. She is eating adequately.

## 2023-02-08 NOTE — PLAN OF CARE
Patient appeared asleep for 7hrs during the night shift. Patient did not have any behavioral or medical concerns. Patient remains on 15min checks. RN will continue to monitor.

## 2023-02-08 NOTE — PLAN OF CARE
OCCUPATIONAL THERAPY GROUP NOTE:     02/07/23 1609   Group Therapy Session   Group Attendance attended group session   Time Session Began 1115   Time Session Ended 1200   Total Time (minutes) 45   Total # Attendees 2   Group Type task skill   Group Topic Covered structured socialization;leisure exploration/use of leisure time;problem-solving;coping skills/lifestyle management   Group Session Detail OT Clinic Group   Patient Response/Contribution cooperative with task;listened actively;organized;pleasant;engaged;congruent affect   Patient Participation Detail Occupational therapy clinic. Purpose of structured group: exploration/development of positive coping skills, engagement in creative expression and clinical observation of social, cognitive, and kinesthetic performance skills. Pt response: Pt actively participated in occupational therapy clinic. Pt was able to ask for assistance as needed, and independently initiate a novel, goal-directed task with min assist for task setup. Pt demonstrated good focus, planning, and concrete problem solving during task completion. Pt appeared comfortable interacting with peers and writer as needed, however she generally kept to herself and appeared focused on her selected task.

## 2023-02-08 NOTE — PLAN OF CARE
"  Problem: Adult Behavioral Health Plan of Care  Goal: Adheres to Safety Considerations for Self and Others  Outcome: Progressing   Goal Outcome Evaluation:    Patient alert and oriented on this shift, took a shower this evening, Tylenol 650 mg given for complain of headache was helpful. PRN Melatonin given with bed time medications. During assessment patient stated, \"my anxiety level is better now, but  during the day shift my anxiety level  was really high because after talking to my consulate,  I was expecting to leave today or tomorrow\". Patient thinks it is going to be 2 to 4 more weeks before she can discharge, she is not really happy about that. rated anxiety 3/10, but denied all other mental health symptoms. Patient was occasionally visible on the unit talking to staff. Ate most of her supper and bed time snack. Denied pain or any discomfort, no behaviors issue.                             "

## 2023-02-08 NOTE — PROGRESS NOTES
"PSYCHIATRY  PROGRESS NOTE     DATE OF SERVICE   02/08/2023           CHIEF COMPLAINT   \"I just have a lot of questions about discharge\"       SUBJECTIVE   Nursing reports: Patient is alert and oriented x4. She is calm and cooperative. She attended OT group this morning, is withdrawn, does not socialize with peers. Patient is medication compliant. Anxiety is very high today, she received hydroxyzine x2.  She denied SI/SIB/AVH. She contracts for safety. Patient is medication compliant. Patient ate 100% of both meals. Patient  Orthostatic BP was 136/82 sitting and 102/69 standing (orthosatic BP drop of 34) after recheck BP was 103/71 sitting and 108/72 standing. Patient  is well groomed. She is eating adequately.         reports: Continue with IRTS referrals and re-establish outpatient providers.       OBJECTIVE   Upon patient interview, patient states that she feels \"a little better but tired from yesterday\".  Patient continues to appear depressed and anxious focusing on prolonged stay as well as updates from IRTS referrals placed yesterday.  Patient intermittently tearful during interaction but less so than yesterday.  Patient feels that she \"is wasting time here\" and at her admission was punitive based on the fact that patient stated that she had no access to maintain medication compliance.  Patient given results of negative chlamydia/gonorrhea labs.  Pending HIV result.  Patient remains concerned of known/long-term small lesion on her labia.  No other gynecological symptoms reported.  Patient reassured that referral for GYN care will be made on an outpatient basis.  Patient states her last Pap was at least 6 or 7 years ago.  Patient denies concerns with current medication regimen.  Patient denies suicidal/homicidal ideation/intent/plan.  Patient denies psychotic symptoms/none observed.  Patient states she is motivated for continued sobriety.       MEDICATIONS   Medications:  Scheduled Meds:    " "busPIRone  10 mg Oral TID     divalproex sodium delayed-release  500 mg Oral Q12H     nicotine  1 patch Transdermal Daily     nicotine   Transdermal Q8H     paliperidone  3 mg Oral Daily     Continuous Infusions:  PRN Meds:.acetaminophen, alum & mag hydroxide-simethicone, hydrOXYzine, melatonin, nicotine, OLANZapine **OR** OLANZapine, senna-docusate, traZODone    Medication adherence issues: MS Med Adherence Y/N: No  Medication side effects: MEDICATION SIDE EFFECTS: no side effects reported  Benefit: Yes / No: Yes       ROS   Pertinent items are noted in HPI.       MENTAL STATUS EXAM   Vitals: /72 (BP Location: Left arm)   Pulse 82   Temp 97.5  F (36.4  C) (Oral)   Resp 16   Ht 1.6 m (5' 3\")   Wt 66.3 kg (146 lb 3.2 oz)   SpO2 99%   BMI 25.90 kg/m      Appearance:  Tearful, anxious  Mood:  {Mood: Anxious   Affect: full range  was congruent to speech  Suicidal Ideation: PRESENT / ABSENT: absent   Homicidal Ideation: PRESENT / ABSENT: absent   Thought process: unremarkable linear  Thought content: devoid of  suicidal ideation and delusions.   Fund of Knowledge: Average  Attention/Concentration: Normal  Language ability:  Intact  Memory:  Immediate recall intact  Insight:  good.  Judgement: good  Orientation: Yes, x4  Psychomotor Behavior: normal or unremarkable    Muscle Strength and Tone: MuscleStrength: Normal  Gait and Station: Normal       LABS   personally reviewed.     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 Jacob MD dated 2/2/23:     1) Continue Invega, VPA and Buspar. VPA level was 90.9  2) Patient admitted under revoked PD. Committed with [facility].   3) CTC to coordinate disposition with [facility] .      Disposition Plan   Reason for ongoing admission: is unable to care for self due to severe psychosis or lacie  Discharge location: TBD  Discharge Medications: not ordered  Follow-up Appointments: not scheduled  Legal Status: full commitment      Psychiatric coverage beginning/provided by CLAUDE Patel CNP on 2/6/2023 at 9:31 AM    1. Ongoing education given regarding diagnostic and treatment options with risks, benefits and alternatives and adequate verbalization of understanding.    2. Psychiatric treatments/interventions:   2/7/23: Increase trazodone to 75 mg nightly as needed  2/8/23: No changes     3. Medical treatments/interventions: No new orders  2/7/23: STD urine screen and HIV serum.     4. Laboratory/Imaging: No new orders    5. Legal Status: Civil commitment    6. Disposition Plan: IRTS               [facility]: Patient able to contract for safety    Coordination of Care:   Treatment Plan reviewed and physician signed, Care discussed with Care/Treatment Team Members, Chart reviewed and Patient seen      Re-Certification I certify that the inpatient psychiatric facility services furnished since the previous certification were, and continue to be, medically necessary for, either, treatment which could reasonably be expected to improve the patient s condition or diagnostic study and that the hospital records indicate that the services furnished were, either, intensive treatment services, admission and related services necessary for diagnostic study,  or equivalent services.     I certify that the patient continues to need, on a daily basis, active treatment furnished directly by or requiring the supervision of inpatient psychiatric facility personnel.   I estimate 5-7 days of hospitalization is necessary for proper treatment of the patient. My plans for post-hospital care for this patient are  group home vs MICD t/x      CLAUDE Patel CNP    -     02/08/2023  -     9:51 AM    Total time  45 minutes with > 50%spent on coordination of cares and psycho-education.    This note was created with help of Dragon dictation system. Grammatical / typing errors are not intentional.    CLAUDE Patel CNP

## 2023-02-08 NOTE — PLAN OF CARE
02/08/23 1648   Group Therapy Session   Group Attendance attended group session   Time Session Began 1600   Time Session Ended 1645   Total Time (minutes) 45   Total # Attendees 4   Group Type psychotherapeutic   Group Topic Covered coping skills/lifestyle management   Group Session Detail Group participated in a structured therapeutic group with a focus on insight, positive change, choices and problem solving via questions and verbal interactions.   Patient Response/Contribution cooperative with task;discussed personal experience with topic   Patient Participation Detail Pt participated actively, shared personal information, shared also an experience when she reports being very hallucinatory while on drugs. Pt also reported regrets asking for help as she now feels stuck; insight is questionable.

## 2023-02-08 NOTE — PLAN OF CARE
"Assessment/Intervention/Current Symptoms and Care Coordination  CTC met with patient at her request today and provided additional information on referrals that were made yesterday for IRTS programs.  Patient is anxious to get her plan figured out and stated to writer \"I'm not very patient\".      Discharge Plan or Goal  Likely to IRTS when stable    Barriers to Discharge:  Christina presents following suspected medication non-adherence and manic symptoms. Her provisional discharge has been revoked by her mental health . She requires symptom stabilization, medication management, and supportive discharge plan.      Referral Status  [facility]- [phone]; has received referral, not reviewed yet, approximately 2 weeks till next opening.  [facility]- [phone]-[id number] referral, will review and call back tomorrow, Feb 20th for next openings.  [facility] [phone] was left.  [facility]- [phone]-otnzrsyy was not received yesterday. No expected beds for February. Referral was re-faxed today to [phone]     is handling 3 additional IRTS referrals.    Legal Status:  Patient is under an MICD Commitment in [facility]   Case No. [id number]   "

## 2023-02-08 NOTE — PROGRESS NOTES
Pt participated in dance/movement therapy (D/MT) using therapeutic organizing in a structured, safe container for connection with space.  We also explored some more personal connections related to body-based memories. Pt was able to reinforce a connection to her son that was grounding to her.       02/08/23 0930   Expressive Therapy   Therapy Type dance/movement   Minutes of Treatment 30

## 2023-02-09 PROCEDURE — 124N000002 HC R&B MH UMMC

## 2023-02-09 PROCEDURE — H2032 ACTIVITY THERAPY, PER 15 MIN: HCPCS

## 2023-02-09 PROCEDURE — 250N000013 HC RX MED GY IP 250 OP 250 PS 637: Performed by: PSYCHIATRY & NEUROLOGY

## 2023-02-09 PROCEDURE — 99233 SBSQ HOSP IP/OBS HIGH 50: CPT | Performed by: PSYCHIATRY & NEUROLOGY

## 2023-02-09 PROCEDURE — 250N000013 HC RX MED GY IP 250 OP 250 PS 637: Performed by: NURSE PRACTITIONER

## 2023-02-09 RX ADMIN — NICOTINE POLACRILEX 4 MG: 2 LOZENGE ORAL at 19:03

## 2023-02-09 RX ADMIN — BUSPIRONE HYDROCHLORIDE 10 MG: 10 TABLET ORAL at 13:21

## 2023-02-09 RX ADMIN — Medication 5 MG: at 19:01

## 2023-02-09 RX ADMIN — PALIPERIDONE 3 MG: 3 TABLET, EXTENDED RELEASE ORAL at 09:07

## 2023-02-09 RX ADMIN — NICOTINE 1 PATCH: 21 PATCH, EXTENDED RELEASE TRANSDERMAL at 09:08

## 2023-02-09 RX ADMIN — DIVALPROEX SODIUM 500 MG: 500 TABLET, DELAYED RELEASE ORAL at 19:01

## 2023-02-09 RX ADMIN — NICOTINE POLACRILEX 4 MG: 2 LOZENGE ORAL at 16:16

## 2023-02-09 RX ADMIN — Medication 75 MG: at 19:01

## 2023-02-09 RX ADMIN — BUSPIRONE HYDROCHLORIDE 10 MG: 10 TABLET ORAL at 19:01

## 2023-02-09 RX ADMIN — NICOTINE POLACRILEX 4 MG: 2 LOZENGE ORAL at 11:30

## 2023-02-09 RX ADMIN — DIVALPROEX SODIUM 500 MG: 500 TABLET, DELAYED RELEASE ORAL at 09:07

## 2023-02-09 RX ADMIN — BUSPIRONE HYDROCHLORIDE 10 MG: 10 TABLET ORAL at 09:07

## 2023-02-09 ASSESSMENT — ACTIVITIES OF DAILY LIVING (ADL)
ADLS_ACUITY_SCORE: 28

## 2023-02-09 NOTE — PLAN OF CARE
Assessment/Intervention/Current Symtoms and Care Coordination:  -Chart review  - Team meeting- nurse reports Christina was sleeping late this day, was mildly irritable when nurse checked in and gave medications. Chart review indicates that Christina is waiting on an IRTS placement.     MIRANDA received voice messages and returned calls to IRTS facilties. Brielle from [facility] will call to do phone interview tomorrrow at 10 am. Chanelle with [facility] will call on Monday and do interview at 11 am. WR met with Christina in her room, gave her update and a list of addresses of [facility] IRTS for her reference as well as a list of sample questions for IRTS interviews. Christina was pleasant and appreciative, said she was very eager to go to an IRTS and would accept going to any location.     Current Symptoms include the following: patient is irritable and anxious  Precautions: Elopement and Cheeking     Discharge Plan or Goal:  Pending stabilization & development of a safe discharge plan.  Considerations include: IRTS or MICD treatment     Barriers to Discharge:  Christina presents following suspected medication non-adherence and manic symptoms. Her provisional discharge has been revoked by her mental health . She requires symptom stabilization, medication management, and supportive discharge plan.      Referral Status:  Referrals TBD (will need to establish with psychiatry, therapy, and PCP. Also seeking dental referral but willing to work with outpatient  to obtain this).     IRTS Referrals (submitted by Vero De Leon on 2/7/2023)  [facility]     IRTS Referrals (submitted by writer on 2/7/2023)  [facility]  Yenifer Cramer     Legal Status:  Patient is under an MICD Commitment in [facility]   Case No. [id number]     Contacts:  Brother - Santo Torres (phone: [phone])  [facility]  - Vero De Leon (phone: [phone], email:  [email or web address].)     Upcoming Meetings/Important Dates:  N/A     Rationale for SIO/No Roommate Order:  Patient is not on SIO.  Patient does not have current roommate. There is potential for this patient to have roommate. (No roommate order discontinued on 2/1/2023).

## 2023-02-09 NOTE — PROGRESS NOTES
"[facility], [place]   Psychiatric Progress Note  Hospital Day: 9        Interim History:   The patient's care was discussed with the treatment team during the daily team meeting and/or staff's chart notes were reviewed.  Staff report patient has been visible in hte milieu, social with peers, denying SI, denying psychosis symptoms, taking medications as prescribed, received PRN melatonin and trazodone for sleep, slept 6.5 hours per charting.     Upon interview, the patient asked if there were any updates about her discharge, reviewed that as of this morning no news on IRTS. She reports she does feel she is ready to go as her mood has improved and is \"back on track\", she doesn't have any safety concerns including SI or HI, she states she just reported SI on admission due to \"being pissed\" but has no plan or thoughts to harm herself. She denies AVH. She is tolerating medications. She denies having any physical aj concerns. Has been trying to attend some groups. She does have some anxiety about some stressors she needs to manage outside of the hospital including her car being impounded. She also wants to make sure she can have an OBGYN appt or appt for Pap smear upon discharge, will discuss with CTC once discharge date known. No additional concerns.          Medications:       busPIRone  10 mg Oral TID     divalproex sodium delayed-release  500 mg Oral Q12H     nicotine  1 patch Transdermal Daily     nicotine   Transdermal Q8H     paliperidone  3 mg Oral Daily          Allergies:     Allergies   Allergen Reactions     Penicillins Itching          Labs:     Recent Results (from the past 48 hour(s))   HIV Antigen Antibody Combo    Collection Time: 02/07/23  1:42 PM   Result Value Ref Range    HIV Antigen Antibody Combo Nonreactive Nonreactive   Chlamydia trachomatis/Neisseria gonorrhoeae by PCR    Collection Time: 02/07/23  1:59 PM    Specimen: Urine, Voided   Result Value Ref Range    " "Chlamydia Trachomatis Negative Negative    Neisseria gonorrhoeae Negative Negative          Psychiatric Examination:     /77 (BP Location: Left arm)   Pulse 80   Temp 98.6  F (37  C) (Oral)   Resp 16   Ht 1.6 m (5' 3\")   Wt 66.3 kg (146 lb 3.2 oz)   SpO2 100%   BMI 25.90 kg/m    Weight is 146 lbs 3.2 oz  Body mass index is 25.9 kg/m .    Orthostatic Vitals       Most Recent      Sitting Orthostatic /71 02/08 0952    Sitting Orthostatic Pulse (bpm) 85 02/08 0952    Standing Orthostatic /72 02/08 0952    Standing Orthostatic Pulse (bpm) 92 02/08 0952        Appearance: awake, alert, adequately groomed and dressed in hospital scrubs  Attitude:  cooperative  Eye Contact:  good  Mood:  \"back on track\" improved from admission  Affect:  mood congruent  Speech:  clear, coherent and normal prosody  Language: fluent and intact in English  Psychomotor, Gait, Musculoskeletal:  no evidence of tardive dyskinesia, dystonia, or tics  Thought Process:  a bit tangential   Associations:  no loose associations  Thought Content:  no evidence of suicidal ideation or homicidal ideation and denies AVH, did not appear responding   Insight:  partial  Judgement:  fair  Oriented to:  time, person, and place  Attention Span and Concentration:  intact  Recent and Remote Memory:  intact  Fund of Knowledge:  appropriate    Clinical Global Impressions  First:  Considering your total clinical experience with this particular patient population, how severe are the patient's symptoms at this time?: 6 (02/09/23 1540)  Compared to the patient's condition at the START of treatment, this patient's condition is: 3 (02/09/23 1540)  Most recent:  Considering your total clinical experience with this particular patient population, how severe are the patient's symptoms at this time?: 6 (02/09/23 1540)  Compared to the patient's condition at the START of treatment, this patient's condition is: 3 (02/09/23 1540)         Precautions: "     Behavioral Orders   Procedures     Cheeking Precautions (behavioral units)     Code 1 - Restrict to Unit     Elopement precautions     Routine Programming     As clinically indicated     Status 15     Every 15 minutes.          Diagnoses:     Bipolar disorder type 1, severe with psychosis versus schizoaffective disorder, bipolar type  Polysubstance abuse  History of methamphetamine-induced psychosis         Assessment & Plan:   Assessment and hospital summary:  51 yo F with history of bipolar disorder and substance use who presented to the ED with lacie and psychosis in context of medication non adherence and ongoing substance use. Medically cleared in ED, admitted under a PD that was revoked with her commitment. PTA paliperidone, Depakote and buspirone were restarted. Trazodone was increased to further target sleep. Her psychosis and mood has started to stabilize. Her VPA level was therapeutic. She was referred for IRTS, has reported concern with length of hospitalization along with some gynecological concerns for lesion that has been present on labia long term, had STI testing which was negative, will have OBGYN or PCP appointment on discharge for gynecological concerns.     Psychiatric treatment/inteventions:  Medications:   Continue medications as below, pt stabilizing and tolerating, no indication for changes today:   Current Facility-Administered Medications   Medication     acetaminophen (TYLENOL) tablet 650 mg     alum & mag hydroxide-simethicone (MAALOX) suspension 30 mL     busPIRone (BUSPAR) tablet 10 mg     calcium carbonate (TUMS) chewable tablet 500 mg     divalproex sodium delayed-release (DEPAKOTE) DR tablet 500 mg     hydrOXYzine (ATARAX) tablet 50 mg     melatonin tablet 5 mg     nicotine (COMMIT) lozenge 4 mg     nicotine (NICODERM CQ) 21 MG/24HR 24 hr patch 1 patch     nicotine Patch in Place     OLANZapine (zyPREXA) tablet 10 mg    Or     OLANZapine (zyPREXA) injection 10 mg     paliperidone  ER (INVEGA) 24 hr tablet 3 mg     senna-docusate (SENOKOT-S/PERICOLACE) 8.6-50 MG per tablet 1 tablet     traZODone (DESYREL) half-tab 75 mg         The risks, benefits, alternatives and side effects have been discussed and are understood by the patient.     Laboratory/Imaging: added hepatic panel to labs to complete admission labs; reviewed others since admission: BMP WNL; Urine HCG negative; CBC WNL; Covid negative; UDS negative; VPA on admission <2.8, recent on 2/1 was 90.9; EtOH level <0.01; Gonorrhea and Chlamydia negative; HIV nonreactive; last HgbA1c completed in Nov 22 and was WNL at 5.0     Patient will be treated in therapeutic milieu with appropriate individual and group therapies as described.     Medical treatment/interventions:  Medical concerns: pt has reported gynecological concerns with long standing lesion on labia, had STI work up that was negative, will have appt for outpatient follow up on discharge       Disposition Plan   Reason for ongoing admission: is unable to care for self due to severe psychosis or lacie  Discharge location: [facility] facility  Discharge Medications: not ordered  Follow-up Appointments: not scheduled  Legal Status: Patient is under an MICD Commitment in [facility]   Case No. [id number]    >50min total time that was spent in counseling and coordination of care with staff, reviewing medical record, educating patient about treatment options, side effects and benefits and alternative treatments for medications, providing supportive therapy and redirection regarding above symptoms.     This document is created with the help of Dragon dictation system.  All grammatical/typing errors or context distortion are unintentional and inherent to software.    Patient has been seen and evaluated by Lulu morrison DO.

## 2023-02-09 NOTE — PLAN OF CARE
Goal Outcome Evaluation:         Problem: Sleep Disturbance  Goal: Adequate Sleep/Rest  Outcome: Progressing     Patient appeared to be asleep for 6.5 hours during safety checks this shift. No complaints or concerns voiced by patient or noted by staff. Will continue to monitor and update if there are changes.

## 2023-02-09 NOTE — PLAN OF CARE
Problem: Anxiety Signs/Symptoms  Goal: Optimized Energy Level (Anxiety Signs/Symptoms)  2/8/2023 2140 by Vik Camarillo RN  Outcome: Progressing   Goal Outcome Evaluation:    Pt was visible in the lounge for much of the shift. She was sociable with select peers. Her mood was calm and affect neutral. Pt denied having safety concerns including thoughts of harming self or others. Denied auditory and visual hallucinations. Pt took her medications as prescribed. No medication side effects noted or reported. Received prn Melatonin and Trazodone for sleep.   Plan: Continue to provide safe environment and therapeutic milieu.

## 2023-02-09 NOTE — PLAN OF CARE
Problem: Anxiety Signs/Symptoms  Goal: Optimized Energy Level (Anxiety Signs/Symptoms)  Outcome: Progressing   Goal Outcome Evaluation:    Pt had a decent shift this evening. He slept through the first half of the shift. He was less hyperactive and more cooperative this shift. He played cards with his SIO staff and seemed to enjoy it. Pt took his medications as prescribed. Pt denied having safety concerns including thoughts of harming self or others. Denied auditory and visual hallucinations.  No medication side effects noted or reported. Pt remains on SIO for safety.   Plan: Status 15s; Build trust with pt. Continue to build on strengths. Encourage compliance and healthy coping.

## 2023-02-09 NOTE — PLAN OF CARE
Shift Summary   Mental Status (3897-0195)  Pt stayed in her room majority of day shift. Participated in some group activity but did not stay till end. isolative and socially withdrawn to self. Mood is depressed with flat affect. Cooperative. Denies SI,SIB,VH,AH,depression and anxiety. Took her medications with no difficulties.   Prn: none  Physical Status (8190-8454)  Moves around with no difficulties.   Hygiene is appropriate.   Slept 6.5 hours last night.   Appetite adequate. Ate both breakfast and lunch.   Pt reported no problem with bowel and bladder.   Denies pain.   Today's Lab orders: none  Sitting /66 & pulse 80  Standing BP 91/61 & pulse 94  Prn: none  Continue to monitor pt's status Q 15 minutes and stabilize the patient's symptoms with the use of medications and therapeutic programming.

## 2023-02-10 ENCOUNTER — RESULTS ONLY (OUTPATIENT)
Dept: PSYCHIATRY | Facility: CLINIC | Age: 53
End: 2023-02-10
Payer: COMMERCIAL

## 2023-02-10 LAB
ALBUMIN SERPL BCG-MCNC: 3.8 G/DL (ref 3.5–5.2)
ALP SERPL-CCNC: 68 U/L (ref 35–104)
ALT SERPL W P-5'-P-CCNC: 12 U/L (ref 10–35)
AST SERPL W P-5'-P-CCNC: 14 U/L (ref 10–35)
ATRIAL RATE - MUSE: 78 BPM
BILIRUB DIRECT SERPL-MCNC: <0.2 MG/DL (ref 0–0.3)
BILIRUB SERPL-MCNC: 0.2 MG/DL
DIASTOLIC BLOOD PRESSURE - MUSE: NORMAL MMHG
INTERPRETATION ECG - MUSE: NORMAL
P AXIS - MUSE: 58 DEGREES
PR INTERVAL - MUSE: 136 MS
PROT SERPL-MCNC: 5.7 G/DL (ref 6.4–8.3)
QRS DURATION - MUSE: 86 MS
QT - MUSE: 398 MS
QTC - MUSE: 453 MS
R AXIS - MUSE: 54 DEGREES
SYSTOLIC BLOOD PRESSURE - MUSE: NORMAL MMHG
T AXIS - MUSE: 65 DEGREES
VENTRICULAR RATE- MUSE: 78 BPM

## 2023-02-10 PROCEDURE — G0177 OPPS/PHP; TRAIN & EDUC SERV: HCPCS

## 2023-02-10 PROCEDURE — 250N000013 HC RX MED GY IP 250 OP 250 PS 637: Performed by: PSYCHIATRY & NEUROLOGY

## 2023-02-10 PROCEDURE — 93010 ELECTROCARDIOGRAM REPORT: CPT | Performed by: INTERNAL MEDICINE

## 2023-02-10 PROCEDURE — 93005 ELECTROCARDIOGRAM TRACING: CPT

## 2023-02-10 PROCEDURE — 99232 SBSQ HOSP IP/OBS MODERATE 35: CPT | Performed by: PSYCHIATRY & NEUROLOGY

## 2023-02-10 PROCEDURE — 124N000002 HC R&B MH UMMC

## 2023-02-10 PROCEDURE — 250N000013 HC RX MED GY IP 250 OP 250 PS 637: Performed by: NURSE PRACTITIONER

## 2023-02-10 RX ADMIN — PALIPERIDONE 3 MG: 3 TABLET, EXTENDED RELEASE ORAL at 08:43

## 2023-02-10 RX ADMIN — Medication 75 MG: at 20:48

## 2023-02-10 RX ADMIN — NICOTINE POLACRILEX 4 MG: 2 LOZENGE ORAL at 16:01

## 2023-02-10 RX ADMIN — ACETAMINOPHEN 650 MG: 325 TABLET, FILM COATED ORAL at 09:41

## 2023-02-10 RX ADMIN — BUSPIRONE HYDROCHLORIDE 10 MG: 10 TABLET ORAL at 19:01

## 2023-02-10 RX ADMIN — HYDROXYZINE HYDROCHLORIDE 50 MG: 50 TABLET, FILM COATED ORAL at 04:20

## 2023-02-10 RX ADMIN — DIVALPROEX SODIUM 500 MG: 500 TABLET, DELAYED RELEASE ORAL at 08:43

## 2023-02-10 RX ADMIN — NICOTINE POLACRILEX 4 MG: 2 LOZENGE ORAL at 19:44

## 2023-02-10 RX ADMIN — NICOTINE 1 PATCH: 21 PATCH, EXTENDED RELEASE TRANSDERMAL at 08:46

## 2023-02-10 RX ADMIN — BUSPIRONE HYDROCHLORIDE 10 MG: 10 TABLET ORAL at 08:43

## 2023-02-10 RX ADMIN — DIVALPROEX SODIUM 500 MG: 500 TABLET, DELAYED RELEASE ORAL at 19:01

## 2023-02-10 RX ADMIN — Medication 5 MG: at 19:02

## 2023-02-10 RX ADMIN — BUSPIRONE HYDROCHLORIDE 10 MG: 10 TABLET ORAL at 13:18

## 2023-02-10 ASSESSMENT — ACTIVITIES OF DAILY LIVING (ADL)
ADLS_ACUITY_SCORE: 28
HYGIENE/GROOMING: INDEPENDENT
ADLS_ACUITY_SCORE: 28
ORAL_HYGIENE: INDEPENDENT
DRESS: SCRUBS (BEHAVIORAL HEALTH)

## 2023-02-10 NOTE — PLAN OF CARE
OCCUPATIONAL THERAPY GROUP NOTE:     02/10/23 1515   Group Therapy Session   Group Attendance attended group session   Time Session Began 1315   Time Session Ended 1400   Total Time (minutes) 45   Total # Attendees 3   Group Type task skill   Group Topic Covered structured socialization;leisure exploration/use of leisure time;problem-solving;coping skills/lifestyle management   Group Session Detail OT Clinic Group   Patient Response/Contribution cooperative with task;listened actively;organized;pleasant;engaged;congruent affect   Patient Participation Detail Occupational therapy clinic. Purpose of structured group: exploration/development of positive coping skills, engagement in creative expression and clinical observation of social, cognitive, and kinesthetic performance skills. Pt response: Pt actively participated in occupational therapy clinic. Chosen activity: a wooden painting activity. Independent to initiate, gather materials, sequence and adjust to workspace demands as needed. Demonstrated good focus (25 minutes without interruption), planning, and attention to detail for this moderately complex task. Able to ask for assistance and socialized with peers and staff. Shared that she is hopeful that she will be discharge to an IRTS on Monday, and she feels that her interview this morning went well. Future-oriented in conversation, and discussed excitement about seeing her cats when she is able to again. Affect appeared to brighten in interactions.

## 2023-02-10 NOTE — PLAN OF CARE
Patient appeared asleep for a total of 6.75hrs during the night shift. Patient did not have any behavioral or medical concerns and remain on 15min checks. RN will continue to monitor.

## 2023-02-10 NOTE — PLAN OF CARE
Assessment/Intervention/Current Symtoms and Care Coordination:  -Chart review  - Team meeting- nurse reports Christina is much same as in days past. Team discussed low BP, encouraging fluids, and will consider EKG.     WR facilitated interview with [facility] IRTS. After interview Christina expressed frustration that she would not be able to go there before next week. Said that Brielle, staff at [facility], said they would get back to  on Monday about possible admission. Christina asked about interview with [facility] and asked WR to check in with MICHELLE Pham if she had any IRTS updates.     WR emailed Chanelle with [facility], she said she could interview Christina on Monday but that they would not have an opening until week of Feb. 20th.     WR emailed with Vero, she stated she had no additional updates.     WR let Christina know about communication with Vero and [facility].     Current Symptoms include the following: patient is irritable and anxious  Precautions: Elopement and Cheeking     Discharge Plan or Goal:  Pending stabilization & development of a safe discharge plan.  Considerations include: IRTS or MICD treatment     Barriers to Discharge:  Christina presents following suspected medication non-adherence and manic symptoms. Her provisional discharge has been revoked by her mental health . She requires symptom stabilization, medication management, and supportive discharge plan.      Referral Status:  Referrals TBD (will need to establish with psychiatry, therapy, and PCP. Also seeking dental referral but willing to work with outpatient  to obtain this).     IRTS Referrals (submitted by Vero De Leon on 2/7/2023)  [facility]     IRTS Referrals (submitted by writer on 2/7/2023)  [facility]  [facility] [place]     Legal Status:  Patient is under an MICD Commitment in [facility]   Case No. [id number]     Contacts:  Brother - Santo Torres (phone: [phone])  [place]  [facility]  - Vero De Leon (phone: [phone], email: [email or web address].)     Upcoming Meetings/Important Dates:  N/A     Rationale for SIO/No Roommate Order:  Patient is not on SIO.  Patient does not have current roommate. There is potential for this patient to have roommate. (No roommate order discontinued on 2/1/2023).

## 2023-02-10 NOTE — PROGRESS NOTES
"[facility], [place]   Psychiatric Progress Note  Hospital Day: 10        Interim History:   The patient's care was discussed with the treatment team during the daily team meeting and/or staff's chart notes were reviewed.  Staff report patient was visible in the milieu, did attend group, noted to be somewhat loose but able to participate, taking medications as prescribed, receiving PRN nicotine replacement, slept 6.75 hours.     Upon interview, patient had just gotten out of the shower, she reports that her mood is \"good\" she continues to deny having any safety concerns including thoughts of harming self or others.  She is tolerating medications.  She denies having any psychosis symptoms including AVH or paranoia.  She asked if there were any updates regarding her discharge to I[facility]S and writer shared they not received any yet but the team meeting will be happening later in the morning.  She did report that she had some interviews today and looking forward to this.  She asked about the possibility of her discharging today, writer reviewed this would be unlikely but if her interview went well and they had immediate openings it could be a possibility.  She expressed some displeasure with possibility of being here over the weekend but understanding the plan is to wait for her to have a safe place for discharge.  No additional concerns.    Noted later in day to have hypotension, patient has been asymptomatic, did have some tachycardia indicating some orthostasis.  We will plan to check EKG, and continue to encourage fluids with patient and monitor for any symptoms.         Medications:       busPIRone  10 mg Oral TID     divalproex sodium delayed-release  500 mg Oral Q12H     nicotine  1 patch Transdermal Daily     nicotine   Transdermal Q8H     paliperidone  3 mg Oral Daily          Allergies:     Allergies   Allergen Reactions     Penicillins Itching          Labs:     No results found for " "this or any previous visit (from the past 48 hour(s)).       Psychiatric Examination:     /77   Pulse 84   Temp 97.4  F (36.3  C) (Temporal)   Resp 16   Ht 1.6 m (5' 3\")   Wt 66.2 kg (145 lb 14.4 oz)   SpO2 98%   BMI 25.85 kg/m    Weight is 145 lbs 14.4 oz  Body mass index is 25.85 kg/m .    Orthostatic Vitals       Most Recent      Sitting Orthostatic BP 97/52 02/10 0900    Sitting Orthostatic Pulse (bpm) 104 02/10 0900    Standing Orthostatic BP 68/43 02/10 0900    Standing Orthostatic Pulse (bpm) 108 02/10 0900        Appearance: awake, alert, adequately groomed and dressed in hospital scrubs  Attitude:  cooperative  Eye Contact:  good  Mood:  \"good\"  Affect:  mood congruent  Speech:  clear, coherent and normal prosody  Language: fluent and intact in English  Psychomotor, Gait, Musculoskeletal:  no evidence of tardive dyskinesia, dystonia, or tics  Thought Process:  goal oriented  Associations:  no loose associations  Thought Content:  no evidence of suicidal ideation or homicidal ideation and denies AVH, did not appear responding   Insight:  partial  Judgement:  fair  Oriented to:  time, person, and place  Attention Span and Concentration:  intact  Recent and Remote Memory:  intact  Fund of Knowledge:  appropriate    Clinical Global Impressions  First:  Considering your total clinical experience with this particular patient population, how severe are the patient's symptoms at this time?: 6 (02/09/23 1540)  Compared to the patient's condition at the START of treatment, this patient's condition is: 3 (02/09/23 1540)  Most recent:  Considering your total clinical experience with this particular patient population, how severe are the patient's symptoms at this time?: 6 (02/09/23 1540)  Compared to the patient's condition at the START of treatment, this patient's condition is: 3 (02/09/23 1540)         Precautions:     Behavioral Orders   Procedures     Cheeking Precautions (behavioral units)     Code 1 " - Restrict to Unit     Elopement precautions     Routine Programming     As clinically indicated     Status 15     Every 15 minutes.          Diagnoses:     Bipolar disorder type 1, severe with psychosis versus schizoaffective disorder, bipolar type  Polysubstance abuse  History of methamphetamine-induced psychosis         Assessment & Plan:   Assessment and hospital summary:  51 yo F with history of bipolar disorder and substance use who presented to the ED with lacie and psychosis in context of medication non adherence and ongoing substance use. Medically cleared in ED, admitted under a PD that was revoked with her commitment. PTA paliperidone, Depakote and buspirone were restarted. Trazodone was increased to further target sleep. Her psychosis and mood has started to stabilize. Her VPA level was therapeutic. She was referred for IRTS, has reported concern with length of hospitalization along with some gynecological concerns for lesion that has been present on labia long term, had STI testing which was negative, will have OBGYN or PCP appointment on discharge for gynecological concerns.     Psychiatric treatment/inteventions:  Medications:   Continue medications as below, pt stabilizing and tolerating, no indication for changes today:   Current Facility-Administered Medications   Medication     acetaminophen (TYLENOL) tablet 650 mg     alum & mag hydroxide-simethicone (MAALOX) suspension 30 mL     busPIRone (BUSPAR) tablet 10 mg     calcium carbonate (TUMS) chewable tablet 500 mg     divalproex sodium delayed-release (DEPAKOTE) DR tablet 500 mg     hydrOXYzine (ATARAX) tablet 50 mg     melatonin tablet 5 mg     nicotine (COMMIT) lozenge 4 mg     nicotine (NICODERM CQ) 21 MG/24HR 24 hr patch 1 patch     nicotine Patch in Place     OLANZapine (zyPREXA) tablet 10 mg    Or     OLANZapine (zyPREXA) injection 10 mg     paliperidone ER (INVEGA) 24 hr tablet 3 mg     senna-docusate (SENOKOT-S/PERICOLACE) 8.6-50 MG per  tablet 1 tablet     traZODone (DESYREL) half-tab 75 mg         The risks, benefits, alternatives and side effects have been discussed and are understood by the patient.     Laboratory/Imaging:  Hepatic panel was added on the labs yesterday does not appear this has been processed yet, will continue to follow     Patient will be treated in therapeutic milieu with appropriate individual and group therapies as described.     Medical treatment/interventions:  Medical concerns: pt has reported gynecological concerns with long standing lesion on labia, had STI work up that was negative, will have appt for outpatient follow up on discharge       Disposition Plan   Reason for ongoing admission: is unable to care for self due to severe psychosis or lacie  Discharge location: IRTS facility  Discharge Medications: not ordered  Follow-up Appointments: not scheduled  Legal Status: Patient is under an MICD Commitment in [facility]   Case No. [id number]     This document is created with the help of Dragon dictation system.  All grammatical/typing errors or context distortion are unintentional and inherent to software.    Patient has been seen and evaluated by Lulu morrison DO.

## 2023-02-10 NOTE — PLAN OF CARE
Shift Summary   Mental Health Status (6688-2605)  Visible in lounge on and off. Isolative and withdrawn to self. Mood is calm with flat affect. Denies SI,SIB,VH,AH,depression and anxiety. Went to evening group activity. No new concern. Feels safe in hospital and able to contracts for safety.    Prn: Nicotine lozenge  Physical Health Status (4830-9887)  Moves around with no difficulties.   Hygiene is appropriate.   Appetite adequate. Ate dinner.   Pt reported no problem with bowel and bladder.   Denies pain.   Sitting /77 & pulse 84  Prn: none  Continue to monitor pt's status Q 15 minutes and stabilize the patient's symptoms with the use of medications and therapeutic programming.

## 2023-02-10 NOTE — PROGRESS NOTES
"  02/09/23 1900   Group Therapy Session   Time Session Began 1610   Time Session Ended 1650   Total Time (minutes) 40   Total # Attendees 3   Group Type expressive therapy   Group Topic Covered balanced lifestyle;relationship;self-care activities   Group Session Detail Attachment Styles thru Songs   Patient Response/Contribution cooperative with task   Patient Participation Detail Engaged in identifying different relationship attachment styles (dependent, interdependent, independent) from a playlist of popular songs, and the qualities of each.  Discussed which category they felt each guzmna fell into and why.  Thoughtful participation, open and cooperative affect.  Shared about being in past relationship where he had to \"hide\" from her in laws when they came over.  Presents as somewhat loose and disinhibited, but able to participate and be engaged within parameters of group.        "

## 2023-02-10 NOTE — PLAN OF CARE
Shift Summary   Mental Health Status (4518-9741)  Visible in milieu. Isolative and withdrawn to self. Mood is calm with flat affect. Denies SI,SIB,VH,AH,depression and anxiety. Pt is in touch with reality. No signs of delusion or psychosis noted. Feels safe in hospital. Took scheduled medication with no difficulties. No side effect reported by the pt or observed by this writer. Insight is fair. Plan of care was reviewed with pt. Pt verbalized understanding the plan and agrees with it. Participated in some group activity.   Prn: none  Physical Health Status (2410-7311)  Moves around with no difficulties.   Hygiene is appropriate.   Slept 6.75 hours last night.   Appetite adequate. Ate both breakfast and lunch.   Pt reported no problem with bowel and bladder.   Lower back pain. Tylenol was administered.   Today's Lab orders: none  Sitting BP 97/52 & pulse 104  Standing BP 68/43 & pulse 108 (Provider was update. EKG done. Pt denies any signs of symptoms of low BP).   Prn: Tylnol  Continue to monitor pt's status Q 15 minutes and stabilize the patient's symptoms with the use of medications and therapeutic programming.

## 2023-02-11 PROCEDURE — 250N000013 HC RX MED GY IP 250 OP 250 PS 637: Performed by: PSYCHIATRY & NEUROLOGY

## 2023-02-11 PROCEDURE — 124N000002 HC R&B MH UMMC

## 2023-02-11 PROCEDURE — G0177 OPPS/PHP; TRAIN & EDUC SERV: HCPCS

## 2023-02-11 PROCEDURE — 250N000013 HC RX MED GY IP 250 OP 250 PS 637: Performed by: NURSE PRACTITIONER

## 2023-02-11 RX ADMIN — NICOTINE POLACRILEX 4 MG: 2 LOZENGE ORAL at 14:39

## 2023-02-11 RX ADMIN — NICOTINE POLACRILEX 4 MG: 2 LOZENGE ORAL at 19:17

## 2023-02-11 RX ADMIN — Medication 5 MG: at 19:17

## 2023-02-11 RX ADMIN — PALIPERIDONE 3 MG: 3 TABLET, EXTENDED RELEASE ORAL at 08:51

## 2023-02-11 RX ADMIN — HYDROXYZINE HYDROCHLORIDE 50 MG: 50 TABLET, FILM COATED ORAL at 04:06

## 2023-02-11 RX ADMIN — NICOTINE POLACRILEX 4 MG: 2 LOZENGE ORAL at 09:45

## 2023-02-11 RX ADMIN — DIVALPROEX SODIUM 500 MG: 500 TABLET, DELAYED RELEASE ORAL at 08:52

## 2023-02-11 RX ADMIN — NICOTINE POLACRILEX 4 MG: 2 LOZENGE ORAL at 11:58

## 2023-02-11 RX ADMIN — DIVALPROEX SODIUM 500 MG: 500 TABLET, DELAYED RELEASE ORAL at 19:17

## 2023-02-11 RX ADMIN — ACETAMINOPHEN 650 MG: 325 TABLET, FILM COATED ORAL at 15:11

## 2023-02-11 RX ADMIN — BUSPIRONE HYDROCHLORIDE 10 MG: 10 TABLET ORAL at 19:17

## 2023-02-11 RX ADMIN — BUSPIRONE HYDROCHLORIDE 10 MG: 10 TABLET ORAL at 13:00

## 2023-02-11 RX ADMIN — NICOTINE 1 PATCH: 21 PATCH, EXTENDED RELEASE TRANSDERMAL at 08:52

## 2023-02-11 RX ADMIN — HYDROXYZINE HYDROCHLORIDE 50 MG: 50 TABLET, FILM COATED ORAL at 16:41

## 2023-02-11 RX ADMIN — BUSPIRONE HYDROCHLORIDE 10 MG: 10 TABLET ORAL at 08:51

## 2023-02-11 ASSESSMENT — ACTIVITIES OF DAILY LIVING (ADL)
ADLS_ACUITY_SCORE: 28
DRESS: SCRUBS (BEHAVIORAL HEALTH)
ADLS_ACUITY_SCORE: 28
HYGIENE/GROOMING: INDEPENDENT
ADLS_ACUITY_SCORE: 28
ADLS_ACUITY_SCORE: 28
ORAL_HYGIENE: INDEPENDENT
ADLS_ACUITY_SCORE: 28
ADLS_ACUITY_SCORE: 28

## 2023-02-11 NOTE — PLAN OF CARE
Patient appeared asleep for 6hrs during the shift. AT 0400, she reported that she hadn't been sleeping for about 2hrs. Patient received some medications for anxiety rated at 4/10. She remains on 15min checks. RN will continue to monitor.

## 2023-02-11 NOTE — PLAN OF CARE
Shift Summary   Mental Health Status (8870-4395)  Slept till 1000. No new concern. Mood is calm with flat affect. Denies SI,SIB,VH,AH,depression. Reported anxiety after lunch. Took scheduled Buspar which seems to be effective. Pt is in touch with reality. Feels safe in hospital. Took scheduled medication with no difficulties. No side effect reported by the pt or observed by this writer. Insight is fair. Judgement is good. Plan of care was reviewed with pt. Pt verbalized understanding the plan and agrees with it.   Prn: none  Physical Health Status (8701-1508)  Moves around with no difficulties.   Hygiene is appropriate.   Slept 6.0 hours last night.   Appetite adequate. Skipped breakfast ,but had lunch.   Pt reported no problem with bowel and bladder.   Denies pain.   Today's Lab orders: none  Sitting /74 & pulse 82  Standing BP & pulse- declined  Prn: none  Continue to monitor pt's status Q 15 minutes and stabilize the patient's symptoms with the use of medications and therapeutic programming.

## 2023-02-11 NOTE — PLAN OF CARE
Occupational Therapy Group Note:       02/11/23 1506   Group Therapy Session   Group Attendance attended group session   Time Session Began 1015   Time Session Ended 1115   Total Time (minutes) 60   Total # Attendees 4   Group Type recreation   Group Topic Covered leisure exploration/use of leisure time;structured socialization   Group Session Detail OT Leisure Group   Patient Response/Contribution confronted peers appropriately;cooperative with task;organized   Patient Participation Detail Patient actively engaged in a occupational therapy group with leisure exploration and engagement being the topic and focus. Leisure exploration offered for increased intrinsic motivation to engage in social situations with peers. This specific leisure game addressed: sequencing, retention, initiation, planning, and organization. Patient was familiar with game. Patient was able to be an active participant and teacher for peer throughout game. Patient demonstrated understanding of strategic game moves. Patient was able to focus on task for full duration of group. Patient was helpful to peer and initiated conversation with select peer (E.D). Calm, cooperative, focused, and engaged throughout group. Affect: congruent.

## 2023-02-11 NOTE — PROGRESS NOTES
02/11/23 1606   Group Therapy Session   Group Attendance attended group session   Time Session Began 1600   Time Session Ended 1640   Total Time (minutes) 15   Total # Attendees 3-4   Group Type recreation   Group Topic Covered leisure exploration/use of leisure time   Group Session Detail TR leisure group   Patient Response/Contribution cooperative with task   Patient Participation Detail Pt briefly attended the structured Therapeutic Recreation group, participating in a group activity. Pt participated in group discussion, leisure participation, and social engagement to gain self-esteem, manage behaviors, improve social skills, decrease isolation, and reduce anxiety/depression.   Pt was sociable and was appropriate with interactions with the others in group. Pt was an active participant, contributing to the clues and descriptions for the activity. Pt participated for approximately x15 minutes before leaving.

## 2023-02-11 NOTE — PLAN OF CARE
Problem: Adult Behavioral Health Plan of Care  Goal: Optimized Coping Skills in Response to Life Stressors  Outcome: Progressing   Goal Outcome Evaluation:         Pt was calm during this shift.  She denied all psych symptoms except that she endorsed anxiety at 3/10.  Pt is concerned about proving to her ex- that she does not have sexually transmitted diseases.  Pt was encouraged to discuss her concern with her doctor.  Pt was med compliant and oral intake was good.

## 2023-02-12 PROCEDURE — 124N000002 HC R&B MH UMMC

## 2023-02-12 PROCEDURE — 250N000013 HC RX MED GY IP 250 OP 250 PS 637: Performed by: PSYCHIATRY & NEUROLOGY

## 2023-02-12 PROCEDURE — 250N000013 HC RX MED GY IP 250 OP 250 PS 637: Performed by: NURSE PRACTITIONER

## 2023-02-12 RX ADMIN — HYDROXYZINE HYDROCHLORIDE 50 MG: 50 TABLET, FILM COATED ORAL at 12:12

## 2023-02-12 RX ADMIN — NICOTINE 1 PATCH: 21 PATCH, EXTENDED RELEASE TRANSDERMAL at 08:42

## 2023-02-12 RX ADMIN — BUSPIRONE HYDROCHLORIDE 10 MG: 10 TABLET ORAL at 08:42

## 2023-02-12 RX ADMIN — PALIPERIDONE 3 MG: 3 TABLET, EXTENDED RELEASE ORAL at 08:41

## 2023-02-12 RX ADMIN — DIVALPROEX SODIUM 500 MG: 500 TABLET, DELAYED RELEASE ORAL at 19:01

## 2023-02-12 RX ADMIN — BUSPIRONE HYDROCHLORIDE 10 MG: 10 TABLET ORAL at 19:01

## 2023-02-12 RX ADMIN — Medication 75 MG: at 20:12

## 2023-02-12 RX ADMIN — NICOTINE POLACRILEX 4 MG: 2 LOZENGE ORAL at 09:23

## 2023-02-12 RX ADMIN — ACETAMINOPHEN 650 MG: 325 TABLET, FILM COATED ORAL at 09:22

## 2023-02-12 RX ADMIN — Medication 5 MG: at 19:01

## 2023-02-12 RX ADMIN — DIVALPROEX SODIUM 500 MG: 500 TABLET, DELAYED RELEASE ORAL at 08:42

## 2023-02-12 RX ADMIN — BUSPIRONE HYDROCHLORIDE 10 MG: 10 TABLET ORAL at 13:53

## 2023-02-12 RX ADMIN — NICOTINE POLACRILEX 4 MG: 2 LOZENGE ORAL at 20:14

## 2023-02-12 ASSESSMENT — ACTIVITIES OF DAILY LIVING (ADL)
ADLS_ACUITY_SCORE: 28
ORAL_HYGIENE: INDEPENDENT
HYGIENE/GROOMING: INDEPENDENT
ADLS_ACUITY_SCORE: 28
DRESS: INDEPENDENT
ADLS_ACUITY_SCORE: 28

## 2023-02-12 NOTE — PLAN OF CARE
Shift Summary   Mental Health Status (5965-2358)  No acute issues noted today. Slept in till 1000. Mood is calm with flat affect. Pleasant and cooperative. Alert and oriented x 4. Visible in lounge, coloring pictures. Isolative and withdraw to self. Pacing in hallway with head phones on. Pt is in touch with reality. No delusion noted. Taking medications with no difficulties. No side effect reported by the pt or observed by this writer. Insight and judgement are fair. Denies all mental health symptoms including SI,SIB,VHH,AH,depression. Later in day, reported anxiety. Prn Hydroxyzine was administered which seems effective. Pt said she had interview for IRTS placement on Friday and she is very hopeful about it.   Prn: Hydroxyzine  Physical Health Status (6590-1093)  Moves around with no difficulties.   Hygiene is appropriate.   Slept 6.5 hours last night.   Appetite adequate. Did not eat breakfast but had   Pt reported no problem with bowel and bladder.   Denies pain.   Today's Lab orders: none  Running low BP. Denies any signs or symptoms of low BP. Pt was encouraged to drink fluid.   Sitting BP 90/62 & pulse 82  Standing BP 97/66 & pulse 85  Prn: none  Continue to monitor pt's status Q 15 minutes and stabilize the patient's symptoms with the use of medications and therapeutic programming.

## 2023-02-12 NOTE — PLAN OF CARE
Patient appeared asleep for 6.5hrs during the shift. Pt. did not have any behavioral or medical concerns during the shift and remain on 15min checks. RN will continue to monitor.

## 2023-02-12 NOTE — PLAN OF CARE
Problem: Adult Behavioral Health Plan of Care  Goal: Absence of New-Onset Illness or Injury  Outcome: Progressing   Goal Outcome Evaluation:    Plan of Care Reviewed With: patient      Pt denied all psych symptoms, except that she rated anxiety at 5/10.  Hydroxyzine prn was given and it had good effects.  Pt was out on the unit, and socialized with select peers.  She was observed listening to music or watching TV at times. Oral intake was good.  Pt was med compliant and there was no behavior concern during this shift.  Pt went to bed before 2000 and she is sleeping at this time.

## 2023-02-13 LAB
ATRIAL RATE - MUSE: 76 BPM
DIASTOLIC BLOOD PRESSURE - MUSE: NORMAL MMHG
INTERPRETATION ECG - MUSE: NORMAL
P AXIS - MUSE: 57 DEGREES
PR INTERVAL - MUSE: 132 MS
QRS DURATION - MUSE: 90 MS
QT - MUSE: 400 MS
QTC - MUSE: 450 MS
R AXIS - MUSE: 54 DEGREES
SYSTOLIC BLOOD PRESSURE - MUSE: NORMAL MMHG
T AXIS - MUSE: 64 DEGREES
VENTRICULAR RATE- MUSE: 76 BPM

## 2023-02-13 PROCEDURE — G0177 OPPS/PHP; TRAIN & EDUC SERV: HCPCS

## 2023-02-13 PROCEDURE — 124N000002 HC R&B MH UMMC

## 2023-02-13 PROCEDURE — 250N000013 HC RX MED GY IP 250 OP 250 PS 637: Performed by: NURSE PRACTITIONER

## 2023-02-13 PROCEDURE — 99232 SBSQ HOSP IP/OBS MODERATE 35: CPT | Performed by: PSYCHIATRY & NEUROLOGY

## 2023-02-13 PROCEDURE — 250N000013 HC RX MED GY IP 250 OP 250 PS 637: Performed by: PSYCHIATRY & NEUROLOGY

## 2023-02-13 RX ORDER — DIVALPROEX SODIUM 500 MG/1
500 TABLET, DELAYED RELEASE ORAL EVERY 12 HOURS
Qty: 60 TABLET | Refills: 0 | Status: SHIPPED | OUTPATIENT
Start: 2023-02-13

## 2023-02-13 RX ORDER — PALIPERIDONE 3 MG/1
3 TABLET, EXTENDED RELEASE ORAL AT BEDTIME
Qty: 30 TABLET | Refills: 0 | Status: SHIPPED | OUTPATIENT
Start: 2023-02-13

## 2023-02-13 RX ORDER — ACETAMINOPHEN 325 MG/1
650 TABLET ORAL EVERY 4 HOURS PRN
COMMUNITY
Start: 2023-02-13

## 2023-02-13 RX ORDER — POLYETHYLENE GLYCOL 3350 17 G
4 POWDER IN PACKET (EA) ORAL
Qty: 168 LOZENGE | Refills: 0 | Status: SHIPPED | OUTPATIENT
Start: 2023-02-13

## 2023-02-13 RX ORDER — TRAZODONE HYDROCHLORIDE 150 MG/1
75 TABLET ORAL
Qty: 30 TABLET | Refills: 0 | Status: SHIPPED | OUTPATIENT
Start: 2023-02-13

## 2023-02-13 RX ORDER — HYDROXYZINE HYDROCHLORIDE 50 MG/1
50 TABLET, FILM COATED ORAL EVERY 4 HOURS PRN
Qty: 60 TABLET | Refills: 0 | Status: SHIPPED | OUTPATIENT
Start: 2023-02-13

## 2023-02-13 RX ORDER — BUSPIRONE HYDROCHLORIDE 10 MG/1
10 TABLET ORAL 3 TIMES DAILY
Qty: 90 TABLET | Refills: 0 | Status: SHIPPED | OUTPATIENT
Start: 2023-02-13

## 2023-02-13 RX ORDER — NICOTINE 21 MG/24HR
1 PATCH, TRANSDERMAL 24 HOURS TRANSDERMAL DAILY
Qty: 28 PATCH | Refills: 0 | Status: SHIPPED | OUTPATIENT
Start: 2023-02-14

## 2023-02-13 RX ADMIN — PALIPERIDONE 3 MG: 3 TABLET, EXTENDED RELEASE ORAL at 08:35

## 2023-02-13 RX ADMIN — HYDROXYZINE HYDROCHLORIDE 50 MG: 50 TABLET, FILM COATED ORAL at 12:04

## 2023-02-13 RX ADMIN — DIVALPROEX SODIUM 500 MG: 500 TABLET, DELAYED RELEASE ORAL at 19:07

## 2023-02-13 RX ADMIN — Medication 5 MG: at 19:07

## 2023-02-13 RX ADMIN — NICOTINE POLACRILEX 4 MG: 2 LOZENGE ORAL at 13:35

## 2023-02-13 RX ADMIN — BUSPIRONE HYDROCHLORIDE 10 MG: 10 TABLET ORAL at 08:35

## 2023-02-13 RX ADMIN — DIVALPROEX SODIUM 500 MG: 500 TABLET, DELAYED RELEASE ORAL at 08:34

## 2023-02-13 RX ADMIN — BUSPIRONE HYDROCHLORIDE 10 MG: 10 TABLET ORAL at 19:07

## 2023-02-13 RX ADMIN — NICOTINE POLACRILEX 4 MG: 2 LOZENGE ORAL at 16:03

## 2023-02-13 RX ADMIN — NICOTINE POLACRILEX 4 MG: 2 LOZENGE ORAL at 08:35

## 2023-02-13 RX ADMIN — NICOTINE POLACRILEX 4 MG: 2 LOZENGE ORAL at 12:04

## 2023-02-13 RX ADMIN — HYDROXYZINE HYDROCHLORIDE 50 MG: 50 TABLET, FILM COATED ORAL at 17:01

## 2023-02-13 RX ADMIN — BUSPIRONE HYDROCHLORIDE 10 MG: 10 TABLET ORAL at 13:35

## 2023-02-13 RX ADMIN — NICOTINE 1 PATCH: 21 PATCH, EXTENDED RELEASE TRANSDERMAL at 08:34

## 2023-02-13 ASSESSMENT — ACTIVITIES OF DAILY LIVING (ADL)
ADLS_ACUITY_SCORE: 28
ADLS_ACUITY_SCORE: 28
DRESS: INDEPENDENT
HYGIENE/GROOMING: INDEPENDENT
ADLS_ACUITY_SCORE: 28
DRESS: SCRUBS (BEHAVIORAL HEALTH)
ADLS_ACUITY_SCORE: 28
HYGIENE/GROOMING: INDEPENDENT
ADLS_ACUITY_SCORE: 28
ADLS_ACUITY_SCORE: 28
ORAL_HYGIENE: INDEPENDENT
ADLS_ACUITY_SCORE: 28
ORAL_HYGIENE: INDEPENDENT
ADLS_ACUITY_SCORE: 28

## 2023-02-13 NOTE — PLAN OF CARE
Pt has a full range affect with an anxious mood attending groups. Pt rates anxiety at 7/10 and depression 0/10. Pt states her anxiety is from discharge disposition. Pt received hydroxyzine for anxiety with little relief. Pt encouraged to coping skills to help with anxiety and worry. Pt rates pain at 0/10. Pt reports no SI/HI/SIB and contracts for safety. Pt denies any hallucinations and not noted responding to any internal stimuli. Pt was medication compliant. No reported or observed medication side effects. Pt was visible on unit and engaged with select. Continue current POC.    Plan of Care Reviewed With: patient Plan of Care Reviewed With: patient    Overall Patient Progress: no changeOverall Patient Progress: no change

## 2023-02-13 NOTE — PROGRESS NOTES
"[facility], [place]   Psychiatric Progress Note  Hospital Day: 13        Interim History:   The patient's care was discussed with the treatment team during the daily team meeting and/or staff's chart notes were reviewed.  Staff report patient has been visible in the milieu, attending some groups, taking medications as prescribed along with PRNs for anxiety and sleep, no reported SI, no episodes of seclusion or restraint over weekend, sleeping 6.25 hours last night.     Upon interview, patient reports her mood is \"good\" and that she has tried to stay busy over the weekend.  She denies having any safety concerns including thoughts of harming self or others.  She denies any symptoms of psychosis including AVH or paranoia.  She is tolerating medications.  She is hopeful for an opening at an IRTS soon.  She reports she is feeling well physically.  No additional concerns.         Medications:       busPIRone  10 mg Oral TID     divalproex sodium delayed-release  500 mg Oral Q12H     nicotine  1 patch Transdermal Daily     nicotine   Transdermal Q8H     paliperidone  3 mg Oral Daily          Allergies:     Allergies   Allergen Reactions     Penicillins Itching          Labs:     No results found for this or any previous visit (from the past 48 hour(s)).       Psychiatric Examination:     BP 98/57 (BP Location: Left arm)   Pulse 93   Temp 98  F (36.7  C) (Oral)   Resp 18   Ht 1.6 m (5' 3\")   Wt 66.2 kg (145 lb 14.4 oz)   SpO2 98%   BMI 25.85 kg/m    Weight is 145 lbs 14.4 oz  Body mass index is 25.85 kg/m .    Orthostatic Vitals       Most Recent      Sitting Orthostatic BP 90/62 02/12 1030    Sitting Orthostatic Pulse (bpm) 82 02/12 1030    Standing Orthostatic BP 97/66 02/12 1030    Standing Orthostatic Pulse (bpm) 85 02/12 1030        Appearance: awake, alert, adequately groomed and dressed in hospital scrubs  Attitude:  cooperative   Eye Contact:  good  Mood:  \"good\"  Affect:  mood " congruent  Speech:  clear, coherent and normal prosody  Language: fluent and intact in English  Psychomotor, Gait, Musculoskeletal:  no evidence of tardive dyskinesia, dystonia, or tics  Thought Process:  goal oriented  Associations:  no loose associations  Thought Content:  no evidence of suicidal ideation or homicidal ideation and denies AVH, did not appear responding   Insight:  partial  Judgement:  fair  Oriented to:  time, person, and place  Attention Span and Concentration:  intact  Recent and Remote Memory:  intact  Fund of Knowledge:  appropriate    Clinical Global Impressions  First:  Considering your total clinical experience with this particular patient population, how severe are the patient's symptoms at this time?: 6 (02/09/23 1540)  Compared to the patient's condition at the START of treatment, this patient's condition is: 3 (02/09/23 1540)  Most recent:  Considering your total clinical experience with this particular patient population, how severe are the patient's symptoms at this time?: 6 (02/09/23 1540)  Compared to the patient's condition at the START of treatment, this patient's condition is: 3 (02/09/23 1540)         Precautions:     Behavioral Orders   Procedures     Cheeking Precautions (behavioral units)     Code 1 - Restrict to Unit     Elopement precautions     Routine Programming     As clinically indicated     Status 15     Every 15 minutes.          Diagnoses:     Bipolar disorder type 1, severe with psychosis versus schizoaffective disorder, bipolar type  Polysubstance abuse  History of methamphetamine-induced psychosis         Assessment & Plan:   Assessment and hospital summary:  53 yo F with history of bipolar disorder and substance use who presented to the ED with lacie and psychosis in context of medication non adherence and ongoing substance use. Medically cleared in ED, admitted under a PD that was revoked with her commitment. PTA paliperidone, Depakote and buspirone were  restarted. Trazodone was increased to further target sleep. Her psychosis and mood has started to stabilize. Her VPA level was therapeutic. She was referred for IRTS, has reported concern with length of hospitalization along with some gynecological concerns for lesion that has been present on labia long term, had STI testing which was negative, will have OBGYN or PCP appointment on discharge for gynecological concerns.     Psychiatric treatment/inteventions:  Medications:   Continue medications as below, pt stabilizing and tolerating, no indication for changes today:   Current Facility-Administered Medications   Medication     acetaminophen (TYLENOL) tablet 650 mg     alum & mag hydroxide-simethicone (MAALOX) suspension 30 mL     busPIRone (BUSPAR) tablet 10 mg     calcium carbonate (TUMS) chewable tablet 500 mg     divalproex sodium delayed-release (DEPAKOTE) DR tablet 500 mg     hydrOXYzine (ATARAX) tablet 50 mg     melatonin tablet 5 mg     nicotine (COMMIT) lozenge 4 mg     nicotine (NICODERM CQ) 21 MG/24HR 24 hr patch 1 patch     nicotine Patch in Place     OLANZapine (zyPREXA) tablet 10 mg    Or     OLANZapine (zyPREXA) injection 10 mg     paliperidone ER (INVEGA) 24 hr tablet 3 mg     senna-docusate (SENOKOT-S/PERICOLACE) 8.6-50 MG per tablet 1 tablet     traZODone (DESYREL) half-tab 75 mg         The risks, benefits, alternatives and side effects have been discussed and are understood by the patient.     Laboratory/Imaging:  Hepatic panel with protein 5.7(L) otherwise WNL     Patient will be treated in therapeutic milieu with appropriate individual and group therapies as described.     Medical treatment/interventions:  Medical concerns: pt has reported gynecological concerns with long standing lesion on labia, had STI work up that was negative, will have appt for outpatient follow up on discharge       Disposition Plan   Reason for ongoing admission: is unable to care for self due to severe psychosis or  lacie  Discharge location: IRTS facility  Discharge Medications: ordered.  Follow-up Appointments: not scheduled  Legal Status: Patient is under an MICD Commitment in [facility]   Case No. [id number]     This document is created with the help of Dragon dictation system.  All grammatical/typing errors or context distortion are unintentional and inherent to software.    Patient has been seen and evaluated by Lulu morrison DO.

## 2023-02-13 NOTE — PROGRESS NOTES
SPIRITUAL HEALTH SERVICES Progress Note  [facility] ([facility]) 10N    Encountered pt Christina on the unit and she requested a visit.    Christina anticipates discharging today or tomorrow and requested prayer for her departure. We prayed together. She expressed hope and uncertainty as she talked about the future. We discussed how the road to recovery is never a straight line.    Plan:  remains available per patient request.     Berta Sepulveda, [facility]  Chaplain Resident  Pager [phone]      * SHS remains available 24/7 for emergent requests/referrals, either by having the switchboard page the on-call  or by entering an ASAP/STAT consult in Epic (this will also page the on-call ). Routine Epic consults receive an initial response within 24 hours.*

## 2023-02-13 NOTE — PLAN OF CARE
Goal Outcome Evaluation:    Plan of Care Reviewed With: patient      Pt denied all psych symptoms, except that she rated anxiety at 3/10.   Pt was out on the unit, and she socialized with some of her peers.  She was observed listening to music or watching TV for some time. Oral intake was good.  Pt was med compliant and there was no behavior concern during this shift.  She took melatonin prn and she went to bed early.  She later took trazodone prn per her request.  She said that she could not fall asleep.  Pt is sleeping at the time.

## 2023-02-13 NOTE — PLAN OF CARE
OCCUPATIONAL THERAPY GROUP NOTE:     02/13/23 1458   Group Therapy Session   Group Attendance attended group session   Time Session Began 1030   Time Session Ended 1200   Total Time (minutes) 909   Total # Attendees 3-4   Group Type task skill   Group Topic Covered structured socialization;leisure exploration/use of leisure time;problem-solving;coping skills/lifestyle management   Group Session Detail OT Clinic Group x2 (two 45 min sessions)   Patient Response/Contribution cooperative with task;listened actively;organized;pleasant;engaged;congruent affect   Patient Participation Detail Occupational therapy clinic. Purpose of structured group: exploration/development of positive coping skills, engagement in creative expression and clinical observation of social, cognitive, and kinesthetic performance skills. Pt response: Pt actively participated in occupational therapy clinic. Chosen activity: a simple coloring activity. Independent to initiate, gather materials, sequence and adjust to workspace demands as needed. Demonstrated excellent focus (40 min without interruption), planning, and attention to detail for this minimally complex task. Able to ask for assistance and socialized with peers and staff. Shared that she is looking forward to a potential discharge to an IRTS this week. Future-oriented in conversation, and socialized with a select peer throughout her time in group. Affect appeared to brighten in interactions.  She expressed some discomfort with a select female peer who was tense and irritable with her over the weekend. Pt was able to take breaks from the group room as needed with support when this peer was becoming slightly agitated during group.

## 2023-02-13 NOTE — PLAN OF CARE
Patient appeared asleep for a total of 6.25hrs. Patient did not have any behavioral or medical concerns and remain on 15min checks. RN will continue to monitor.

## 2023-02-13 NOTE — PLAN OF CARE
"Assessment/Intervention/Current Symtoms and Care Coordination:  -Chart review  -Team meeting - Christina reports having a good weekend and presents with greater range in affect. She has been complaining of intrusiveness from peer (T.T.). Christina reports feeling hopeful she'll get into an IRTS this week.   -Writer called [facility] (phone: [phone]) to follow up on IRTS interview completed on 2/10, left voicemail and sent email to Masha in intake.  -Writer connected with Chanelle at [facility] who states she did not complete interview this morning as she was told by Julita MARINELLI that Christina had another interview and they were going to wait to hear the result. Chanelle stated she could talk with Christina around 2pm today to complete an interview - writer provided unit number.   -Writer followed up with [facility] to request update on the referrals she initiated.   -Writer received update from Masha that she will pass the message to the sites to follow up with team on referral.  -Writer received update from Chanelle stating the interview went well and she thinks Christina would be a good fit, though Christina was frustrated that the soonest admission date would be 2/21. Writer asked to have Christina set to admit that day, and will update her if there are any changes in the plan. Chanelle sent intake paperwork for team to complete and asks that Christina be sent with a 30 day supply of meds and a psychiatry appointment.   -Writer received call from Esme at [facility] (phone: [phone], ext. [phone]) and she said she'd like to talk with Christina following phone screening and to talk about interest in sites. Writer explained Christina seems to be prioritizing soonest discharge versus location preference.   -Writer met with Christina to provide check-in. She stated Shawna was from [facility], not [place] and indicated it was a follow up from her phone interview on Friday and that she \"said Brielle's name\". Christina states she's been in here for \"weeks\" and needs to get out " of the hospitals ASA. Writer indicated team is working with outpatient  to coordinate.   Current Symptoms include the following: anxious  Precautions: Elopement and Cheeking    Discharge Plan or Goal:  Pending stabilization & development of a safe discharge plan.  Considerations include: IRTS     Barriers to Discharge:  Christina presents following suspected medication non-adherence and manic symptoms. Her provisional discharge has been revoked by her mental health . She requires symptom stabilization, medication management, and supportive discharge plan.     Referral Status:  Referrals TBD (will need to establish with psychiatry, therapy, and PCP. Also seeking dental referral but willing to work with outpatient  to obtain this).     IRTS Referrals (submitted by Vero De Leon on 2/7/2023)    [facility]     IRTS Referrals (submitted by writer on 2/7/2023)    [facility]    [facility]     Legal Status:  Patient is under an MICD Commitment in [facility]   Case No. [id number]     Contacts:    Brother - Santo Torres (phone: [phone])    [facility]  - Vero De Leon (phone: [phone], email: [email or web address])    Upcoming Meetings/Important Dates:    Monday, February 13 at 2pm by phone - Interview with Chanelle from [facility]    Rationale for SIO/No Roommate Order:  Patient is not on SIO.  Patient has current roommate.

## 2023-02-14 PROCEDURE — 124N000002 HC R&B MH UMMC

## 2023-02-14 PROCEDURE — 99232 SBSQ HOSP IP/OBS MODERATE 35: CPT | Performed by: PSYCHIATRY & NEUROLOGY

## 2023-02-14 PROCEDURE — 250N000013 HC RX MED GY IP 250 OP 250 PS 637: Performed by: PSYCHIATRY & NEUROLOGY

## 2023-02-14 PROCEDURE — 250N000013 HC RX MED GY IP 250 OP 250 PS 637: Performed by: NURSE PRACTITIONER

## 2023-02-14 PROCEDURE — G0177 OPPS/PHP; TRAIN & EDUC SERV: HCPCS

## 2023-02-14 RX ADMIN — NICOTINE 1 PATCH: 21 PATCH, EXTENDED RELEASE TRANSDERMAL at 08:37

## 2023-02-14 RX ADMIN — NICOTINE POLACRILEX 4 MG: 2 LOZENGE ORAL at 15:52

## 2023-02-14 RX ADMIN — HYDROXYZINE HYDROCHLORIDE 50 MG: 50 TABLET, FILM COATED ORAL at 12:21

## 2023-02-14 RX ADMIN — PALIPERIDONE 3 MG: 3 TABLET, EXTENDED RELEASE ORAL at 08:34

## 2023-02-14 RX ADMIN — OLANZAPINE 10 MG: 10 TABLET, FILM COATED ORAL at 15:01

## 2023-02-14 RX ADMIN — DIVALPROEX SODIUM 500 MG: 500 TABLET, DELAYED RELEASE ORAL at 19:09

## 2023-02-14 RX ADMIN — Medication 75 MG: at 19:09

## 2023-02-14 RX ADMIN — BUSPIRONE HYDROCHLORIDE 10 MG: 10 TABLET ORAL at 08:35

## 2023-02-14 RX ADMIN — BUSPIRONE HYDROCHLORIDE 10 MG: 10 TABLET ORAL at 19:09

## 2023-02-14 RX ADMIN — DIVALPROEX SODIUM 500 MG: 500 TABLET, DELAYED RELEASE ORAL at 08:34

## 2023-02-14 RX ADMIN — BUSPIRONE HYDROCHLORIDE 10 MG: 10 TABLET ORAL at 13:15

## 2023-02-14 RX ADMIN — NICOTINE POLACRILEX 4 MG: 2 LOZENGE ORAL at 08:37

## 2023-02-14 ASSESSMENT — ACTIVITIES OF DAILY LIVING (ADL)
ADLS_ACUITY_SCORE: 28

## 2023-02-14 NOTE — PROGRESS NOTES
"[facility] [place]   Psychiatric Progress Note  Hospital Day: 14        Interim History:   The patient's care was discussed with the treatment team during the daily team meeting and/or staff's chart notes were reviewed.  Staff report patient has been visible in the milieu, attending groups, having some frustration with length of stay, taking medications as prescribed, no episodes of seclusion/restraint, slept 6.75 hours.     Upon interview, patient was up and already having breakfast for the morning, she reports that her mood is \"good\" she denies having any safety concerns including thoughts of harming self or others.  Denies AVH. She reports she is tolerating medications, denies any side effects.  She does continue to have some occasional heartburn which PRN Tums is helpful for her, she inquired about having these for discharge and then stated that she knows she can buy them over the counter and will just plan to do this.  She continues to wait on opening at IRTS, no additional concerns.         Medications:       busPIRone  10 mg Oral TID     divalproex sodium delayed-release  500 mg Oral Q12H     nicotine  1 patch Transdermal Daily     nicotine   Transdermal Q8H     paliperidone  3 mg Oral Daily          Allergies:     Allergies   Allergen Reactions     Penicillins Itching          Labs:     No results found for this or any previous visit (from the past 48 hour(s)).       Psychiatric Examination:     /83   Pulse 78   Temp 98.5  F (36.9  C) (Oral)   Resp 18   Ht 1.6 m (5' 3\")   Wt 66.2 kg (145 lb 14.4 oz)   SpO2 98%   BMI 25.85 kg/m    Weight is 145 lbs 14.4 oz  Body mass index is 25.85 kg/m .    Orthostatic Vitals       Most Recent      Sitting Orthostatic /74 02/14 0700    Sitting Orthostatic Pulse (bpm) 72 02/14 0700    Standing Orthostatic /61 02/14 0700    Standing Orthostatic Pulse (bpm) 82 02/14 0700        Appearance: awake, alert, adequately groomed " "and dressed in hospital scrubs  Attitude:  cooperative   Eye Contact:  good  Mood:  \"good\"  Affect:  mood congruent  Speech:  clear, coherent and normal prosody  Language: fluent and intact in English  Psychomotor, Gait, Musculoskeletal:  no evidence of tardive dyskinesia, dystonia, or tics  Thought Process:  linear and goal oriented  Associations:  no loose associations  Thought Content:  no evidence of suicidal ideation or homicidal ideation and no evidence of psychotic thought  Insight:  partial  Judgement:  fair  Oriented to:  time, person, and place  Attention Span and Concentration:  intact  Recent and Remote Memory:  intact  Fund of Knowledge:  appropriate    Clinical Global Impressions  First:  Considering your total clinical experience with this particular patient population, how severe are the patient's symptoms at this time?: 6 (02/09/23 1540)  Compared to the patient's condition at the START of treatment, this patient's condition is: 3 (02/09/23 1540)  Most recent:  Considering your total clinical experience with this particular patient population, how severe are the patient's symptoms at this time?: 6 (02/09/23 1540)  Compared to the patient's condition at the START of treatment, this patient's condition is: 3 (02/09/23 1540)         Precautions:     Behavioral Orders   Procedures     Cheeking Precautions (behavioral units)     Code 1 - Restrict to Unit     Elopement precautions     Routine Programming     As clinically indicated     Status 15     Every 15 minutes.          Diagnoses:     Bipolar disorder type 1, severe with psychosis versus schizoaffective disorder, bipolar type  Polysubstance abuse  History of methamphetamine-induced psychosis         Assessment & Plan:   Assessment and hospital summary:  51 yo F with history of bipolar disorder and substance use who presented to the ED with lacie and psychosis in context of medication non adherence and ongoing substance use. Medically cleared in ED, " admitted under a PD that was revoked with her commitment. PTA paliperidone, Depakote and buspirone were restarted. Trazodone was increased to further target sleep. Her psychosis and mood has started to stabilize. Her VPA level was therapeutic. She was referred for IRTS, has reported concern with length of hospitalization along with some gynecological concerns for lesion that has been present on labia long term, had STI testing which was negative, will have OBGYN or PCP appointment on discharge for gynecological concerns.     Psychiatric treatment/inteventions:  Medications:   Continue medications as below, pt stabilizing and tolerating, no indication for changes today:   Current Facility-Administered Medications   Medication     acetaminophen (TYLENOL) tablet 650 mg     alum & mag hydroxide-simethicone (MAALOX) suspension 30 mL     busPIRone (BUSPAR) tablet 10 mg     calcium carbonate (TUMS) chewable tablet 500 mg     divalproex sodium delayed-release (DEPAKOTE) DR tablet 500 mg     hydrOXYzine (ATARAX) tablet 50 mg     melatonin tablet 5 mg     nicotine (COMMIT) lozenge 4 mg     nicotine (NICODERM CQ) 21 MG/24HR 24 hr patch 1 patch     nicotine Patch in Place     OLANZapine (zyPREXA) tablet 10 mg    Or     OLANZapine (zyPREXA) injection 10 mg     paliperidone ER (INVEGA) 24 hr tablet 3 mg     senna-docusate (SENOKOT-S/PERICOLACE) 8.6-50 MG per tablet 1 tablet     traZODone (DESYREL) half-tab 75 mg         The risks, benefits, alternatives and side effects have been discussed and are understood by the patient.     Laboratory/Imaging:  no new labs ordered     Patient will be treated in therapeutic milieu with appropriate individual and group therapies as described.     Medical treatment/interventions:  Medical concerns: pt has reported gynecological concerns with long standing lesion on labia, had STI work up that was negative, will have appt for outpatient follow up on discharge       Disposition Plan   Reason for  ongoing admission: is unable to care for self due to severe psychosis or lacie  Discharge location: [facility] facility  Discharge Medications: ordered.  Follow-up Appointments: not scheduled  Legal Status: Patient is under an MICD Commitment in [facility]   Case No. [id number]     This document is created with the help of Dragon dictation system.  All grammatical/typing errors or context distortion are unintentional and inherent to software.    Patient has been seen and evaluated by Lulu morrison DO.

## 2023-02-14 NOTE — PLAN OF CARE
Occupational Therapy Group     02/14/23 1444   Group Therapy Session   Group Attendance attended group session   Time Session Began 1115   Time Session Ended 1205   Total Time (minutes) 45   Total # Attendees 5   Group Type psychoeducation;life skill   Group Topic Covered coping skills/lifestyle management;emotions/expression;self-care activities   Group Session Detail General Health and Coping: group discussion on affirmations/self-compassion and boundaries   Patient Participation Detail Pt participated in group discussion on self-compassion and boundaries. Pt appeared engaged in the discussion, contributing thoughts and insight to discussion topics and working collaboratively on the group activity (choosing affirmations to write, decorating affirmations sheet with stickers). Pt worked with peer at table looking through stickers to share. Pt appeared upbeat and in good spirits.

## 2023-02-14 NOTE — PROGRESS NOTES
"Behavioral Health  Note    Behavioral Health  Spirituality Group Note    UNIT 10N    Name:    Christina Vásquez                                                                   YOB: 1970    MRN:     [id number]                                                                     Age: 52 year old      Patient attended -led group, which included discussion of spirituality, coping with illness and building resilience. Today's topic was the heart.    Patient attended group for [facility] - spirituality groups are not billed.    The patient actively participated in group discussion and patient demonstrated an appreciation of topic's application for their personal circumstances.    Christina shared the drawing of her heart, which is broken and needs healing. She named that parts are still good and there's hope for change in the future. She acknowledged needing \"time\" to heal.     Berta Sepulveda, [facility]  Resident   Pager: [phone]    "

## 2023-02-14 NOTE — PLAN OF CARE
"Occupational Therapy Group     02/14/23 1423   Group Therapy Session   Group Attendance attended group session   Time Session Began 1015   Time Session Ended 1100   Total Time (minutes) 45   Total # Attendees 4   Group Type recreation;task skill   Group Topic Covered leisure exploration/use of leisure time;self-care activities;problem-solving   Group Session Detail OT Clinic: open group for creative expression, promoting autonomy, building sense of self-worth, and opportunity to exercise cognitive skills (i.e. initiation, planning, organization, sequencing, sustained attention, follow-through, termination of task).   Patient Participation Detail Pt participated in in open OT group and worked on decorating/painting a fabric tote. Writer set pt up with materials; pt was independent with designing and painting, sharing with writer pt's thought process behind the design (\"I like monkeys\"). Pt appeared focused when painting and mostly worked quietly; pt was conversational with peer who shared the same table; pt gave peer positive feedback on peer's project.        "

## 2023-02-14 NOTE — PLAN OF CARE
"Assessment/Intervention/Current Symtoms and Care Coordination:  -Chart review  -Team meeting - Christina continues to focus on discharge and reports anxiety about this. She has been accepting of her medications.   -Writer received response from [facility]ero stating she's heard from Shawna at [facility] who is hoping to get Christina admitted this week to [facility] location.   -Writer reached out to [place] (phone: [phone]) - Dhruv answered phone and shared that Shawna is out until tomorrow but he forwarded writer to her voicemail - left at 12:49pm. Writer also sent email ([email or web address]).  -Writer received call back from [facility] stating they have in their notes that Christina will be admitted to [facility] versus [facility]. Shawna plans to reach out to their staff and ask that they get in contact with writer.   -Writer met with Christina to provide update. She said she wants to go today and thinks it's \"getting ridiculous\" if she can't go today or tomorrow. Writer explained team is advocating on her behalf and completed necessary paperwork today. Writer explained remaining pieces needed to be coordinated including scheduling psychiatry appointment and completing PD.   -Writer called [facility] (phone: [phone]) to schedule psychiatry - scheduled for 2/21.   -Writer received confirmation from [facility] staff that Christina can admit tomorrow 2/15.  -Writer met with Christina to go over PD and she agreed to sign. She asked if she could leave today instead and writer explained the IRTS has to follow an admission process and team needs to complete discharge process as well.   Current Symptoms include the following: patient is irritable and anxious  Precautions: Elopement and Cheeking     Discharge Plan or Goal:  [facility] (accepted on 2/15/2023 between 10am-12pm)     Barriers to Discharge:  Team is coordinating provisional discharge and final pieces related to discharge and admission " to IRTS.      Referral Status:  Referrals TBD (will need to establish with psychiatry, therapy, and PCP. Also seeking dental referral but willing to work with outpatient  to obtain this).     IRTS Referrals (submitted by Vero De Leon on 2/7/2023)    [facility]    [facility]    [facility]     IRTS Referrals (submitted by writer on 2/7/2023)    [facility] (general referral for all sites)    [facility]     Legal Status:  Patient is under an MICD Commitment in [facility]   Case No. [id number]     Contacts:    Brother - Santo Torres (phone: [phone])    [facility]  - Vero De Leon (phone: [phone], email: [email or web address])     Upcoming Meetings/Important Dates:  N/A     Rationale for SIO/No Roommate Order:  Patient is not on SIO.  Patient has current roommate.

## 2023-02-14 NOTE — PLAN OF CARE
02/14/23 [facility]   Behavioral Team Discussion   Participants Lulu Brown DO; Maegan Turcios RN; JOZEF Teran   Progress Some improvement   Anticipated length of stay 17 days   Continued Stay Criteria/Rationale There is no current safe alternative to hospitalization - team is working on IRTS referrals. Christina requires ongoing medication management and supportive discharge plan.   Precautions Elopement and Cheeking   Plan Continue with IRTS referrals and re-establish outpatient providers.   Anticipated Discharge Disposition IRTS

## 2023-02-14 NOTE — PLAN OF CARE
"Patient is alert and oriented x4. She is present in the milieu. She attended OT groups. Mood is calm, anxious. Affect is flat. Patient denied all MH symptoms including SI/SIB. She endorsed anxiety of 8 out of 10. She received hydroxyzine with good relief. Patient denied any physical discomfort. She ate 100% of her meals (both breakfast and lunch). She is neat and well groomed. Staff will continue to monitor. /74 (BP Location: Left arm, Patient Position: Sitting, Cuff Size: Adult Regular)   Pulse 72   Temp 98.6  F (37  C) (Oral)   Resp 16   Ht 1.6 m (5' 3\")   Wt 66.8 kg (147 lb 3.2 oz)   SpO2 99%   BMI 26.08 kg/m                            "

## 2023-02-14 NOTE — PLAN OF CARE
Goal Outcome Evaluation:        Patient was in bed at the beginning of the shift, breathing quietly and unlabored.   Pt slept 6.75 hrs  No concerns reported or noted this shift.  Will continue to Monitor.  PRNs:None

## 2023-02-15 VITALS
WEIGHT: 147.2 LBS | TEMPERATURE: 98.2 F | RESPIRATION RATE: 16 BRPM | SYSTOLIC BLOOD PRESSURE: 110 MMHG | HEIGHT: 63 IN | OXYGEN SATURATION: 99 % | HEART RATE: 98 BPM | BODY MASS INDEX: 26.08 KG/M2 | DIASTOLIC BLOOD PRESSURE: 78 MMHG

## 2023-02-15 PROCEDURE — 99239 HOSP IP/OBS DSCHRG MGMT >30: CPT | Performed by: PSYCHIATRY & NEUROLOGY

## 2023-02-15 PROCEDURE — 250N000013 HC RX MED GY IP 250 OP 250 PS 637: Performed by: PSYCHIATRY & NEUROLOGY

## 2023-02-15 RX ADMIN — BUSPIRONE HYDROCHLORIDE 10 MG: 10 TABLET ORAL at 08:42

## 2023-02-15 RX ADMIN — DIVALPROEX SODIUM 500 MG: 500 TABLET, DELAYED RELEASE ORAL at 08:42

## 2023-02-15 RX ADMIN — PALIPERIDONE 3 MG: 3 TABLET, EXTENDED RELEASE ORAL at 08:42

## 2023-02-15 RX ADMIN — NICOTINE POLACRILEX 4 MG: 2 LOZENGE ORAL at 09:59

## 2023-02-15 RX ADMIN — NICOTINE POLACRILEX 4 MG: 2 LOZENGE ORAL at 08:42

## 2023-02-15 RX ADMIN — NICOTINE 1 PATCH: 21 PATCH, EXTENDED RELEASE TRANSDERMAL at 08:41

## 2023-02-15 ASSESSMENT — ACTIVITIES OF DAILY LIVING (ADL)
ADLS_ACUITY_SCORE: 28

## 2023-02-15 NOTE — PLAN OF CARE
Patient discharged from station 10 to irts at 1014. Adult suicide risk completed. Pt verbalized understanding of all discharge instructions and medications. Medications filled and sent with pt except OTC melatonin. Pt left with all personal belongings. Patient denies any SI and contracts for safety. Pt escorted out by psych associate.

## 2023-02-15 NOTE — DISCHARGE SUMMARY
"Psychiatric Discharge Summary    Christina Vásquez MRN# [id number]   Age: 52 year old YOB: 1970     Date of Admission:  1/31/2023  Date of Discharge:  2/15/2023  Admitting Physician:  Magnus James MD  Discharge Physician:  Lulu Brown DO           Event Leading to Hospitalization:   The patient is a 53yo female with a history of bipolar disorder and substance-induced psychosis who was admitted after being off her medications and demonstrating manic symptoms. The patient says that she \"ran out of gas\" and \"went somewhere to warm up and they brought me here.\" Doesn't feel that she needs to be here. Says that she has a \"safe house in [place]\" but then later says she is at \"[facility]\" and that there is a  there. Mood is \"good - I just had a bad day.\" Denies problems with sleep or appetite. Denies SI or HI. Denies AH or VH. Denies illicit drug use. Says that she ran out of her medications and wasn't able to get them filled.       Per ER:  Christina Vásquez is a 52 year old female with a history of anxiety, depression, bipolar disorder, schizophrenia, psychosis, and polysubstance abuse who presents via EMS for a mental health evaluation. EMS reports that the patient was found by police in a broken down car (out of gas) tonight and was unable to provide history or tell them how she got there. PD adds that she has not been taking her medications, not sleeping well recently, and has been seeing people that are not there. She has been seen in the ED several times for similar symptoms and was most recently admitted in November of 2022. In the past she reported that her head is \"full of information\" and when she is seen by the ED this information is taken and then she is discharged to the \"streets\". Christina is currently paranoid and believes everyone is out to get her, particularly [employer] companies that are supposedly stealing her information. She notes that she would like to talk the " "president or  of an optical company \"now.\" She adds that she would also like staff to get a hold of her old employer and the \"president of this hospital.\" When asked about these requests she states \"It's an emergency.\"        See Admission note by Magnus James MD on 2/1/23 for additional details.          DIagnoses:     Bipolar disorder type 1, severe with psychosis versus schizoaffective disorder, bipolar type  Polysubstance abuse  History of methamphetamine-induced psychosis         Labs:     Recent Results (from the past 504 hour(s))   Asymptomatic COVID-19 Virus (Coronavirus) by PCR Nasopharyngeal    Collection Time: 01/27/23  1:58 AM    Specimen: Nasopharyngeal; Swab   Result Value Ref Range    SARS CoV2 PCR Negative Negative   Basic metabolic panel (BMP)    Collection Time: 01/27/23  2:23 AM   Result Value Ref Range    Sodium 139 136 - 145 mmol/L    Potassium 3.5 3.4 - 5.3 mmol/L    Chloride 104 98 - 107 mmol/L    Carbon Dioxide (CO2) 22 22 - 29 mmol/L    Anion Gap 13 7 - 15 mmol/L    Urea Nitrogen 15.0 6.0 - 20.0 mg/dL    Creatinine 0.90 0.51 - 0.95 mg/dL    Calcium 9.4 8.6 - 10.0 mg/dL    Glucose 102 (H) 70 - 99 mg/dL    GFR Estimate 78 >60 mL/min/1.73m2   Alcohol level blood    Collection Time: 01/27/23  2:23 AM   Result Value Ref Range    Alcohol ethyl <0.01 <=0.01 g/dL   Valproic acid (Depakote level)    Collection Time: 01/27/23  2:23 AM   Result Value Ref Range    Valproic acid <2.8 (L)   ug/mL   CBC with platelets and differential    Collection Time: 01/27/23  2:23 AM   Result Value Ref Range    WBC Count 7.3 4.0 - 11.0 10e3/uL    RBC Count 4.40 3.80 - 5.20 10e6/uL    Hemoglobin 13.7 11.7 - 15.7 g/dL    Hematocrit 40.2 35.0 - 47.0 %    MCV 91 78 - 100 fL    MCH 31.1 26.5 - 33.0 pg    MCHC 34.1 31.5 - 36.5 g/dL    RDW 11.9 10.0 - 15.0 %    Platelet Count 328 150 - 450 10e3/uL    % Neutrophils 60 %    % Lymphocytes 32 %    % Monocytes 7 %    % Eosinophils 1 %    % Basophils 0 %    % Immature " Granulocytes 0 %    NRBCs per 100 WBC 0 <1 /100    Absolute Neutrophils 4.4 1.6 - 8.3 10e3/uL    Absolute Lymphocytes 2.4 0.8 - 5.3 10e3/uL    Absolute Monocytes 0.5 0.0 - 1.3 10e3/uL    Absolute Eosinophils 0.1 0.0 - 0.7 10e3/uL    Absolute Basophils 0.0 0.0 - 0.2 10e3/uL    Absolute Immature Granulocytes 0.0 <=0.4 10e3/uL    Absolute NRBCs 0.0 10e3/uL   HCG qualitative urine    Collection Time: 01/31/23  6:06 PM   Result Value Ref Range    hCG Urine Qualitative Negative Negative   Drug abuse screen 6 urine (chem dep) ([facility])    Collection Time: 01/31/23  6:06 PM   Result Value Ref Range    Amphetamines Urine Screen Negative Screen Negative    Barbiturates Urine Screen Negative Screen Negative    Benzodiazepines Urine Screen Negative Screen Negative    Cannabinoids Urine Screen Negative Screen Negative    Cocaine Urine Screen Negative Screen Negative    Ethanol Urine Screen Negative Screen Negative    Opiates Urine Screen Negative Screen Negative   Valproic acid    Collection Time: 02/01/23  8:19 AM   Result Value Ref Range    Valproic acid 90.9   ug/mL   HIV Antigen Antibody Combo    Collection Time: 02/07/23  1:42 PM   Result Value Ref Range    HIV Antigen Antibody Combo Nonreactive Nonreactive   Hepatic panel    Collection Time: 02/07/23  1:42 PM   Result Value Ref Range    Protein Total 5.7 (L) 6.4 - 8.3 g/dL    Albumin 3.8 3.5 - 5.2 g/dL    Bilirubin Total 0.2 <=1.2 mg/dL    Alkaline Phosphatase 68 35 - 104 U/L    AST 14 10 - 35 U/L    ALT 12 10 - 35 U/L    Bilirubin Direct <0.20 0.00 - 0.30 mg/dL   Chlamydia trachomatis/Neisseria gonorrhoeae by PCR    Collection Time: 02/07/23  1:59 PM    Specimen: Urine, Voided   Result Value Ref Range    Chlamydia Trachomatis Negative Negative    Neisseria gonorrhoeae Negative Negative   EKG 12-lead, complete    Collection Time: 02/10/23 10:09 AM   Result Value Ref Range    Systolic Blood Pressure  mmHg    Diastolic Blood Pressure  mmHg    Ventricular Rate 78 BPM    Atrial  Rate 78 BPM    IL Interval 136 ms    QRS Duration 86 ms     ms    QTc 453 ms    P Axis 58 degrees    R AXIS 54 degrees    T Axis 65 degrees    Interpretation ECG       ** Poor data quality, interpretation may be adversely affected  Sinus rhythm with Fusion complexes  Otherwise normal ECG  When compared with ECG of 25-MAY-2022 13:11,  Fusion complexes are now Present     EKG 12-lead, tracing only    Collection Time: 02/10/23 10:09 AM   Result Value Ref Range    Systolic Blood Pressure  mmHg    Diastolic Blood Pressure  mmHg    Ventricular Rate 76 BPM    Atrial Rate 76 BPM    IL Interval 132 ms    QRS Duration 90 ms     ms    QTc 450 ms    P Axis 57 degrees    R AXIS 54 degrees    T Axis 64 degrees    Interpretation ECG       Sinus rhythm  Normal ECG  When compared with ECG of 10-FEB-2023 10:09, (unconfirmed)  No significant change was found  Confirmed by MD CHASE, MEENAKSHI ([id number]) on 2/13/2023 10:02:46 AM                Consults:   No consultations were requested during this admission         Hospital Course:   Christina Vásquez is a 53 yo F with history of bipolar disorder and substance use who presented to the ED with lacie and psychosis in context of medication non adherence and ongoing substance use. Medically cleared in ED, admitted under a PD that was revoked with her commitment. PTA paliperidone, Depakote and buspirone were restarted. Trazodone was increased to further target sleep. Her psychosis and mood has started to stabilize. Her VPA level was therapeutic. She was referred for IRTS, has reported concern with length of hospitalization along with some gynecological concerns for lesion that has been present on labia long term, had STI testing which was negative, will have OBGYN or PCP appointment on discharge for gynecological concerns.     Christina Vásquez did participate in groups and was visible in the milieu. The patient's symptoms of psychosis improved. She was accepted to IRTS for provisional  discharge on 2/15/23.     Prior to discharge Christina Vásquez reports having no thoughts of harming self or others. In addition, she has notable risk factors for self-harm, including age, single status, psychosis, substance abuse and previous suicide attempts. However, risk is mitigated by ability to volunteer a safety plan, history of seeking help when needed and has been future oriented during admission and denying SI. Therefore, based on all available evidence including the factors cited above, she does not appear to be at imminent risk for self-harm, does not meet criteria for a 72-hr hold, and therefore remains appropriate for ongoing outpatient level of care.     Christina Vásquez was provisionally discharged to [facility]. At the time of discharge Christina Vásquez was determined to not be a danger to herself or others.          Discharge Medications:     Current Discharge Medication List      START taking these medications    Details   acetaminophen (TYLENOL) 325 MG tablet Take 2 tablets (650 mg) by mouth every 4 hours as needed for mild pain (to moderate pain)    Associated Diagnoses: Bipolar affective disorder, currently manic, severe, with psychotic features (H)      melatonin 5 MG tablet Take 1 tablet (5 mg) by mouth nightly as needed for sleep  Qty: 30 tablet, Refills: 0    Associated Diagnoses: Bipolar affective disorder, currently manic, severe, with psychotic features (H)      nicotine (COMMIT) 2 MG lozenge Place 2 lozenges (4 mg) inside cheek every hour as needed for smoking cessation  Qty: 168 lozenge, Refills: 0    Associated Diagnoses: Nicotine use disorder      nicotine (NICODERM CQ) 21 MG/24HR 24 hr patch Place 1 patch onto the skin daily  Qty: 28 patch, Refills: 0    Associated Diagnoses: Nicotine use disorder         CONTINUE these medications which have CHANGED    Details   busPIRone (BUSPAR) 10 MG tablet Take 1 tablet (10 mg) by mouth 3 times daily  Qty: 90 tablet, Refills: 0    Associated Diagnoses:  "Bipolar affective disorder, currently manic, severe, with psychotic features (H)      divalproex sodium delayed-release (DEPAKOTE) 500 MG DR tablet Take 1 tablet (500 mg) by mouth every 12 hours  Qty: 60 tablet, Refills: 0    Associated Diagnoses: Bipolar affective disorder, currently manic, severe, with psychotic features (H)      hydrOXYzine (ATARAX) 50 MG tablet Take 1 tablet (50 mg) by mouth every 4 hours as needed for anxiety  Qty: 60 tablet, Refills: 0    Associated Diagnoses: Anxiety      paliperidone ER (INVEGA) 3 MG 24 hr tablet Take 1 tablet (3 mg) by mouth At Bedtime  Qty: 30 tablet, Refills: 0    Associated Diagnoses: Bipolar affective disorder, currently manic, severe, with psychotic features (H)      traZODone (DESYREL) 150 MG tablet Take 0.5 tablets (75 mg) by mouth nightly as needed for sleep (may repeat after 60 minutes)  Qty: 30 tablet, Refills: 0    Associated Diagnoses: Bipolar affective disorder, currently manic, severe, with psychotic features (H)                  Psychiatric Examination:   Appearance:  awake, alert, adequately groomed and dressed in hospital scrubs  Attitude:  cooperative  Eye Contact:  good  Mood:  \"good\"  Affect:  mood congruent  Speech:  clear, coherent and normal prosody  Psychomotor Behavior:  no evidence of tardive dyskinesia, dystonia, or tics  Thought Process:  linear and goal oriented  Associations:  no loose associations  Thought Content:  no evidence of suicidal ideation or homicidal ideation and no evidence of psychotic thought  Insight:  partial  Judgment:  fair  Oriented to:  time, person, and place  Attention Span and Concentration:  intact  Recent and Remote Memory:  intact  Language: English, fluent  Fund of Knowledge: appropriate  Muscle Strength and Tone: normal  Gait and Station: Normal         Discharge Plan:   Health Care Follow-up:     Appointment Date/Time: Monday, February 21 at 1:20pm via telehealth   Psychiatrist/Medication Management: Inge" JAMIE Taylor     Address: [facility] [address], [address], [place] [address]   Phone Number: [phone]    Fax: [phone]  Note: Call the clinic to provide updated phone number and email address. An email address is required for this appointment as intake paperwork and the link to join will be sent to it. Intake paperwork is required to be completed prior to your appointment, and if incomplete, your appointment will need to be rescheduled. Please call Kristan (phone: [phone]) to provide this information.      Attend all scheduled appointments with your outpatient providers. Call at least 24 hours in advance if you need to reschedule an appointment to ensure continued access to your outpatient providers.     Attestation:  Patient has been seen and evaluated by me, Lulu Brown DO prior to discharge. 40 minutes were spent in coordination of discharge planning including completing IRTS paperwork.

## 2023-02-15 NOTE — PLAN OF CARE
Assessment/Intervention/Current Symtoms and Care Coordination:  -Chart review  -Writer received PD back from [facility]. RN is providing Christina with a copy of PD.  -AVS is complete and ready to be printed by RN.  -Writer faxed PD and COS to [facility] (fax: [phone]).   Current Symptoms include the following: stable for discharge/at baseline  Precautions: Elopement and Cheeking     Discharge Plan or Goal:  [facility] IRTS (accepted on 2/15/2023 between 10am-12pm)     Barriers to Discharge:  Christina is provisionally discharging today.      Referral Status:  Referred to psychiatry at [facility].     Legal Status:  Patient is under an MICD Commitment in [facility]   Case No. [id number]     Contacts:    Brother - Santo Torres (phone: [phone])    [facility]  - [facility] [place] (phone: [phone], email: [email or web address])     Upcoming Meetings/Important Dates:  N/A     Rationale for SIO/No Roommate Order:  Patient is not on SIO.  Patient has current roommate.

## 2023-02-15 NOTE — PLAN OF CARE
Problem: Anxiety Signs/Symptoms  Goal: Improved Somatic Symptoms (Anxiety Signs/Symptoms)  Outcome: Progressing  Flowsheets (Taken 2/14/2023 2158)  Mutually Determined Action Steps (Improved Somatic Symptoms): rates anxiety level via scale   Goal Outcome Evaluation:    The patient spent the entire evening in her room resting in bed, coming out for dinner only. Her demeanor was flat, but when she was engaged, she was polite and pleasant. She denied all other mental health symptoms except anxiety, which she rated as 3/10. She ate dinner and took her scheduled medications and Melatonin PRN at .

## 2023-02-17 ENCOUNTER — PATIENT OUTREACH (OUTPATIENT)
Dept: CARE COORDINATION | Facility: CLINIC | Age: 53
End: 2023-02-17
Payer: COMMERCIAL

## 2023-02-17 NOTE — PROGRESS NOTES
Clinic Care Coordination Contact  [facility]       Clinical Data: Care Coordinator Outreach  Outreach attempted x 2.  Unable to leave message as phone rings continuously.  Plan: Care Coordinator will make no further outreaches at this time.    RENETTA Caballero   Social Work Clinic Care Coordinator   [facility]  PH: [phone]  [email or web address]

## 2023-04-20 DIAGNOSIS — F31.2 BIPOLAR AFFECTIVE DISORDER, CURRENTLY MANIC, SEVERE, WITH PSYCHOTIC FEATURES (H): ICD-10-CM

## 2023-04-20 DIAGNOSIS — F17.200 NICOTINE USE DISORDER: ICD-10-CM

## 2023-04-20 DIAGNOSIS — F41.9 ANXIETY: ICD-10-CM

## 2023-04-21 RX ORDER — NICOTINE 21 MG/24HR
PATCH, TRANSDERMAL 24 HOURS TRANSDERMAL
Qty: 28 PATCH | Refills: 0 | OUTPATIENT
Start: 2023-04-21

## 2023-04-21 RX ORDER — BUSPIRONE HYDROCHLORIDE 10 MG/1
TABLET ORAL
Qty: 90 TABLET | Refills: 0 | OUTPATIENT
Start: 2023-04-21

## 2023-04-21 RX ORDER — HYDROXYZINE HYDROCHLORIDE 50 MG/1
TABLET, FILM COATED ORAL
Qty: 60 TABLET | Refills: 0 | OUTPATIENT
Start: 2023-04-21

## 2023-04-21 RX ORDER — PALIPERIDONE 3 MG/1
TABLET, EXTENDED RELEASE ORAL
Qty: 30 TABLET | Refills: 0 | OUTPATIENT
Start: 2023-04-21

## 2023-04-21 RX ORDER — DIVALPROEX SODIUM 500 MG/1
TABLET, DELAYED RELEASE ORAL
Qty: 60 TABLET | Refills: 0 | OUTPATIENT
Start: 2023-04-21

## 2023-04-21 RX ORDER — POLYETHYLENE GLYCOL 3350 17 G
POWDER IN PACKET (EA) ORAL
Qty: 144 LOZENGE | Refills: 0 | OUTPATIENT
Start: 2023-04-21

## 2023-04-21 RX ORDER — TRAZODONE HYDROCHLORIDE 150 MG/1
TABLET ORAL
Qty: 30 TABLET | Refills: 0 | OUTPATIENT
Start: 2023-04-21

## 2023-05-30 ENCOUNTER — OFFICE VISIT (OUTPATIENT)
Dept: FAMILY MEDICINE | Facility: CLINIC | Age: 53
End: 2023-05-30
Payer: COMMERCIAL

## 2023-05-30 ENCOUNTER — HOSPITAL ENCOUNTER (EMERGENCY)
Facility: HOSPITAL | Age: 53
Discharge: STILL ADMITTED OR ADMINISTRATIVE | End: 2023-05-31
Payer: COMMERCIAL

## 2023-05-30 VITALS
TEMPERATURE: 97.4 F | SYSTOLIC BLOOD PRESSURE: 123 MMHG | WEIGHT: 145 LBS | BODY MASS INDEX: 25.69 KG/M2 | OXYGEN SATURATION: 99 % | DIASTOLIC BLOOD PRESSURE: 81 MMHG | HEART RATE: 66 BPM | RESPIRATION RATE: 18 BRPM

## 2023-05-30 DIAGNOSIS — F31.2 BIPOLAR AFFECTIVE DISORDER, CURRENTLY MANIC, SEVERE, WITH PSYCHOTIC FEATURES (H): Primary | ICD-10-CM

## 2023-05-30 PROCEDURE — 99204 OFFICE O/P NEW MOD 45 MIN: CPT

## 2023-05-30 NOTE — ED NOTES
Expected Patient Referral to ED  11:37 AM    Referring Clinic/Provider:  [facility]    Reason for referral/Clinical facts:  Extensive chronic BH history.  Likely presenting more manic and not compliant with meds.  Pt not emergently harmful to herself or others today, so doesn't require a hold, physician but worried about her ability to care for herself.  Pt refused to take ambulance anywhere, but will walk here for further eval.        Recommendations provided:  Send to ED for further evaluation    Caller was informed that this institution does possess the capabilities and/or resources to provide for patient and should be transferred to our facility.    Discussed that if direct admit is sought and any hurdles are encountered, this ED would be happy to see the patient and evaluate.    Informed caller that recommendations provided are recommendations based only on the facts provided and that they responsible to accept or reject the advice, or to seek a formal in person consultation as needed and that this ED will see/treat patient should they arrive.      Agustin Samuel MD  [facility] EMERGENCY DEPARTMENT  [address] [place] [id number]  [phone]       Agustin Samuel MD  05/30/23 [id number]

## 2023-05-30 NOTE — PATIENT INSTRUCTIONS
Thank you for coming in today.  As we discussed I would like you to go over to [facility] across the street to go to the emergency department.  They will assist you with your concerns today as well as be able to assist you with the other concerns that you had.  I have called over to speak with them and they know that you are coming.

## 2023-05-30 NOTE — PROGRESS NOTES
"  Assessment & Plan     Bipolar affective disorder, currently manic, severe, with psychotic features (H)  52-year-old female with significant psychiatric past medical history who has not been taking her medications in some time presenting for her \"mind being confused\".  She recently was hospitalized for bipolar disorder secondary to manic behavior in January and discharged in February.  She has since run out of her medications and has not had follow-up.  She is asking for refills of her psychiatric meds which we do not do in urgent care.  She has no resources to assist in getting her to appointments including no current car that is working and no phone.  I recommended that patient go to the emergency department both for continued evaluation and for social work support.  I spoke with Dr. Rico at the [facility] emergency department and stated that the patient would be walking over.  Given patient is alert and oriented and presented to urgent care on her own and is no danger to herself or others I deemed it appropriate for patient to walk over to the emergency department.  On conclusion of the visit patient was willing to present to the emergency department for evaluation of her psychiatric needs as well as assistance in support with social work.    No follow-ups on file.    Kody Coyle DO  [facility]    Jodi Vasquez is a 52 year old, presenting for the following health issues:  Concerns  (Client states \" I'm feeling that I'm not living on this digital world- I feel weird- I don't want to speak up I scare you will send to some psychotic institution. My vision and hearing off- what I hearing from you sounds like different directions- seeing 3D images )    NAE Boyer is coming in today complaining that \"my mind is confused\".  She states that her eyes ears nose and throat are also not working as a \"should\".  She states that when she looks at things they seem to be 3D even though they are " "supposed to be 2D.  She is quite tangential with her speaking but denies any substance abuse.  She reports she has not taken her antipsychotics in several days as they have not been refilled.  She would like those refilled today.  She states that she is not tired and has not been tired in some time and is unsure about the amount of sleep that she has been getting.  She states she does not have a watch or phone to keep time therefore she has no idea what time she goes to bed or what time she wakes up.  She states that she was \"kicked out\" of [facility] last night and not allowed into the building.  She also states that she had a virtual visit for a psychiatrist and they \"refused to refill her medications\".  She is concerned because she has no money, broken down car, and no gas.  She states she has no else to go and is \"concerned about speaking out\".  She adamantly denies any thoughts of self-harm with no plan.  Also adamantly denies no thoughts of hurting others.    Review of Systems   Constitutional, HEENT, cardiovascular, pulmonary, gi and gu systems are negative, except as otherwise noted.      Objective    /81 (BP Location: Right arm, Patient Position: Sitting, Cuff Size: Adult Large)   Pulse 66   Temp 97.4  F (36.3  C) (Tympanic)   Resp 18   Wt 65.8 kg (145 lb)   SpO2 99%   BMI 25.69 kg/m    Body mass index is 25.69 kg/m .  Physical Exam   GENERAL: Anxious, alert and oriented x3  NECK: no adenopathy, no asymmetry, masses, or scars and thyroid normal to palpation  RESP: lungs clear to auscultation - no rales, rhonchi or wheezes  CV: regular rate and rhythm, normal S1 S2, no S3 or S4, no murmur, click or rub, no peripheral edema and peripheral pulses strong  ABDOMEN: soft, nontender, no hepatosplenomegaly, no masses and bowel sounds normal  MS: no gross musculoskeletal defects noted, no edema  PSYCH: concentration poor, tangential, anxious and speech pressured    No results found for this " or any previous visit (from the past 24 hour(s)).

## 2023-06-11 ENCOUNTER — APPOINTMENT (OUTPATIENT)
Dept: CT IMAGING | Facility: CLINIC | Age: 53
End: 2023-06-11
Attending: EMERGENCY MEDICINE
Payer: COMMERCIAL

## 2023-06-11 ENCOUNTER — HOSPITAL ENCOUNTER (EMERGENCY)
Facility: CLINIC | Age: 53
Discharge: HOME | End: 2023-06-11
Attending: EMERGENCY MEDICINE | Admitting: EMERGENCY MEDICINE
Payer: COMMERCIAL

## 2023-06-11 VITALS
SYSTOLIC BLOOD PRESSURE: 124 MMHG | OXYGEN SATURATION: 98 % | TEMPERATURE: 97.9 F | HEART RATE: 75 BPM | RESPIRATION RATE: 16 BRPM | DIASTOLIC BLOOD PRESSURE: 61 MMHG

## 2023-06-11 DIAGNOSIS — N39.0 URINARY TRACT INFECTION IN FEMALE: ICD-10-CM

## 2023-06-11 DIAGNOSIS — M54.50 ACUTE BILATERAL LOW BACK PAIN WITHOUT SCIATICA: ICD-10-CM

## 2023-06-11 LAB
ALBUMIN UR-MCNC: 10 MG/DL
ANION GAP SERPL CALCULATED.3IONS-SCNC: 11 MMOL/L (ref 7–15)
APPEARANCE UR: CLEAR
BASOPHILS # BLD AUTO: 0 10E3/UL (ref 0–0.2)
BASOPHILS NFR BLD AUTO: 1 %
BILIRUB UR QL STRIP: NEGATIVE
BUN SERPL-MCNC: 14.2 MG/DL (ref 6–20)
CALCIUM SERPL-MCNC: 9.1 MG/DL (ref 8.6–10)
CHLORIDE SERPL-SCNC: 104 MMOL/L (ref 98–107)
COLOR UR AUTO: YELLOW
CREAT SERPL-MCNC: 0.77 MG/DL (ref 0.51–0.95)
DEPRECATED HCO3 PLAS-SCNC: 26 MMOL/L (ref 22–29)
EOSINOPHIL # BLD AUTO: 0.2 10E3/UL (ref 0–0.7)
EOSINOPHIL NFR BLD AUTO: 3 %
ERYTHROCYTE [DISTWIDTH] IN BLOOD BY AUTOMATED COUNT: 12.2 % (ref 10–15)
GFR SERPL CREATININE-BSD FRML MDRD: >90 ML/MIN/1.73M2
GLUCOSE SERPL-MCNC: 108 MG/DL (ref 70–99)
GLUCOSE UR STRIP-MCNC: NEGATIVE MG/DL
HCT VFR BLD AUTO: 35.4 % (ref 35–47)
HGB BLD-MCNC: 12.3 G/DL (ref 11.7–15.7)
HGB UR QL STRIP: ABNORMAL
HYALINE CASTS: 6 /LPF
IMM GRANULOCYTES # BLD: 0 10E3/UL
IMM GRANULOCYTES NFR BLD: 0 %
KETONES UR STRIP-MCNC: NEGATIVE MG/DL
LEUKOCYTE ESTERASE UR QL STRIP: ABNORMAL
LYMPHOCYTES # BLD AUTO: 2.2 10E3/UL (ref 0.8–5.3)
LYMPHOCYTES NFR BLD AUTO: 30 %
MCH RBC QN AUTO: 31.7 PG (ref 26.5–33)
MCHC RBC AUTO-ENTMCNC: 34.7 G/DL (ref 31.5–36.5)
MCV RBC AUTO: 91 FL (ref 78–100)
MONOCYTES # BLD AUTO: 0.4 10E3/UL (ref 0–1.3)
MONOCYTES NFR BLD AUTO: 6 %
MUCOUS THREADS #/AREA URNS LPF: PRESENT /LPF
NEUTROPHILS # BLD AUTO: 4.5 10E3/UL (ref 1.6–8.3)
NEUTROPHILS NFR BLD AUTO: 60 %
NITRATE UR QL: NEGATIVE
NRBC # BLD AUTO: 0 10E3/UL
NRBC BLD AUTO-RTO: 0 /100
PH UR STRIP: 5.5 [PH] (ref 5–7)
PLATELET # BLD AUTO: 338 10E3/UL (ref 150–450)
POTASSIUM SERPL-SCNC: 3.6 MMOL/L (ref 3.4–5.3)
RBC # BLD AUTO: 3.88 10E6/UL (ref 3.8–5.2)
RBC URINE: 6 /HPF
SODIUM SERPL-SCNC: 141 MMOL/L (ref 136–145)
SP GR UR STRIP: 1.02 (ref 1–1.03)
SQUAMOUS EPITHELIAL: 2 /HPF
UROBILINOGEN UR STRIP-MCNC: 2 MG/DL
WBC # BLD AUTO: 7.3 10E3/UL (ref 4–11)
WBC URINE: 23 /HPF

## 2023-06-11 PROCEDURE — 96372 THER/PROPH/DIAG INJ SC/IM: CPT | Performed by: EMERGENCY MEDICINE

## 2023-06-11 PROCEDURE — 250N000011 HC RX IP 250 OP 636: Performed by: EMERGENCY MEDICINE

## 2023-06-11 PROCEDURE — 250N000013 HC RX MED GY IP 250 OP 250 PS 637: Performed by: EMERGENCY MEDICINE

## 2023-06-11 PROCEDURE — 36415 COLL VENOUS BLD VENIPUNCTURE: CPT | Performed by: EMERGENCY MEDICINE

## 2023-06-11 PROCEDURE — 74177 CT ABD & PELVIS W/CONTRAST: CPT

## 2023-06-11 PROCEDURE — 80048 BASIC METABOLIC PNL TOTAL CA: CPT | Performed by: EMERGENCY MEDICINE

## 2023-06-11 PROCEDURE — 96374 THER/PROPH/DIAG INJ IV PUSH: CPT | Mod: 59

## 2023-06-11 PROCEDURE — 72131 CT LUMBAR SPINE W/O DYE: CPT

## 2023-06-11 PROCEDURE — 81001 URINALYSIS AUTO W/SCOPE: CPT | Performed by: EMERGENCY MEDICINE

## 2023-06-11 PROCEDURE — 250N000009 HC RX 250: Performed by: EMERGENCY MEDICINE

## 2023-06-11 PROCEDURE — 96375 TX/PRO/DX INJ NEW DRUG ADDON: CPT

## 2023-06-11 PROCEDURE — 85025 COMPLETE CBC W/AUTO DIFF WBC: CPT | Performed by: EMERGENCY MEDICINE

## 2023-06-11 PROCEDURE — 99285 EMERGENCY DEPT VISIT HI MDM: CPT | Mod: 25

## 2023-06-11 RX ORDER — CEPHALEXIN 500 MG/1
500 CAPSULE ORAL 2 TIMES DAILY
Qty: 14 CAPSULE | Refills: 0 | Status: SHIPPED | OUTPATIENT
Start: 2023-06-11

## 2023-06-11 RX ORDER — HYDROMORPHONE HYDROCHLORIDE 1 MG/ML
0.5 INJECTION, SOLUTION INTRAMUSCULAR; INTRAVENOUS; SUBCUTANEOUS ONCE
Status: COMPLETED | OUTPATIENT
Start: 2023-06-11 | End: 2023-06-11

## 2023-06-11 RX ORDER — CYCLOBENZAPRINE HCL 10 MG
10 TABLET ORAL 3 TIMES DAILY PRN
Qty: 10 TABLET | Refills: 0 | Status: SHIPPED | OUTPATIENT
Start: 2023-06-11 | End: 2023-06-11

## 2023-06-11 RX ORDER — CYCLOBENZAPRINE HCL 10 MG
10 TABLET ORAL 3 TIMES DAILY PRN
Qty: 10 TABLET | Refills: 0 | Status: SHIPPED | OUTPATIENT
Start: 2023-06-11

## 2023-06-11 RX ORDER — CYCLOBENZAPRINE HCL 10 MG
10 TABLET ORAL ONCE
Status: COMPLETED | OUTPATIENT
Start: 2023-06-11 | End: 2023-06-11

## 2023-06-11 RX ORDER — IOPAMIDOL 755 MG/ML
500 INJECTION, SOLUTION INTRAVASCULAR ONCE
Status: COMPLETED | OUTPATIENT
Start: 2023-06-11 | End: 2023-06-11

## 2023-06-11 RX ORDER — OXYCODONE HYDROCHLORIDE 5 MG/1
5 TABLET ORAL EVERY 6 HOURS PRN
Qty: 6 TABLET | Refills: 0 | Status: SHIPPED | OUTPATIENT
Start: 2023-06-11 | End: 2023-06-11

## 2023-06-11 RX ORDER — LIDOCAINE 4 G/G
1 PATCH TOPICAL ONCE
Status: DISCONTINUED | OUTPATIENT
Start: 2023-06-11 | End: 2023-06-12 | Stop reason: HOSPADM

## 2023-06-11 RX ORDER — KETOROLAC TROMETHAMINE 30 MG/ML
30 INJECTION, SOLUTION INTRAMUSCULAR; INTRAVENOUS ONCE
Status: COMPLETED | OUTPATIENT
Start: 2023-06-11 | End: 2023-06-11

## 2023-06-11 RX ORDER — CEFTRIAXONE 1 G/1
1 INJECTION, POWDER, FOR SOLUTION INTRAMUSCULAR; INTRAVENOUS ONCE
Status: COMPLETED | OUTPATIENT
Start: 2023-06-11 | End: 2023-06-11

## 2023-06-11 RX ORDER — ACETAMINOPHEN 325 MG/1
650 TABLET ORAL ONCE
Status: COMPLETED | OUTPATIENT
Start: 2023-06-11 | End: 2023-06-11

## 2023-06-11 RX ORDER — CEPHALEXIN 500 MG/1
500 CAPSULE ORAL 2 TIMES DAILY
Qty: 14 CAPSULE | Refills: 0 | Status: SHIPPED | OUTPATIENT
Start: 2023-06-11 | End: 2023-06-11

## 2023-06-11 RX ORDER — HYDROMORPHONE HYDROCHLORIDE 1 MG/ML
0.5 INJECTION, SOLUTION INTRAMUSCULAR; INTRAVENOUS; SUBCUTANEOUS EVERY 30 MIN PRN
Status: DISCONTINUED | OUTPATIENT
Start: 2023-06-11 | End: 2023-06-12 | Stop reason: HOSPADM

## 2023-06-11 RX ORDER — OXYCODONE HYDROCHLORIDE 5 MG/1
5 TABLET ORAL EVERY 6 HOURS PRN
Qty: 6 TABLET | Refills: 0 | Status: SHIPPED | OUTPATIENT
Start: 2023-06-11

## 2023-06-11 RX ADMIN — LIDOCAINE PATCH 4% 1 PATCH: 40 PATCH TOPICAL at 19:31

## 2023-06-11 RX ADMIN — SODIUM CHLORIDE 58 ML: 9 INJECTION, SOLUTION INTRAVENOUS at 20:13

## 2023-06-11 RX ADMIN — KETOROLAC TROMETHAMINE 30 MG: 30 INJECTION, SOLUTION INTRAMUSCULAR at 19:34

## 2023-06-11 RX ADMIN — CEFTRIAXONE 1 G: 1 INJECTION, POWDER, FOR SOLUTION INTRAMUSCULAR; INTRAVENOUS at 22:35

## 2023-06-11 RX ADMIN — ACETAMINOPHEN 650 MG: 325 TABLET, FILM COATED ORAL at 19:32

## 2023-06-11 RX ADMIN — CYCLOBENZAPRINE HYDROCHLORIDE 10 MG: 10 TABLET, FILM COATED ORAL at 19:33

## 2023-06-11 RX ADMIN — HYDROMORPHONE HYDROCHLORIDE 0.5 MG: 1 INJECTION, SOLUTION INTRAMUSCULAR; INTRAVENOUS; SUBCUTANEOUS at 22:58

## 2023-06-11 RX ADMIN — IOPAMIDOL 73 ML: 755 INJECTION, SOLUTION INTRAVENOUS at 20:13

## 2023-06-11 ASSESSMENT — ACTIVITIES OF DAILY LIVING (ADL)
ADLS_ACUITY_SCORE: 35

## 2023-06-11 NOTE — ED TRIAGE NOTES
Back pain x5 days. Comes in via EMS. Pt was able to walk from her house out to the ambulance. Pt has not taken anything for pain.

## 2023-06-12 NOTE — ED PROVIDER NOTES
[facility] ED Provider Note  [facility] Emergency Department  3:16 AM  6/12/2023    Christina Vásquez  52 year oldfemale    Chief Complaint   Patient presents with     Back Pain       HPI:  52-year-old female with 5 days of atraumatic lower back pain.  No associated falls or trauma fever abdominal pain vomiting new numbness tingling in her legs nor incontinence.  She has mention some urinary frequency but does not notice significant pain or hematuria.  Multiple previous episodes of low back pain but no injections or surgeries.  No known provoking movement or recent increase in activity or repetitive actions.      ROS: 10 point ROS is negative other than mentioned above in HPI    Past Medical History:   Diagnosis Date     Amphetamine abuse      Anxiety      Asthma      Bipolar disorder      Cocaine abuse (H)      No past surgical history on file.        Current Outpatient Medications   Medication Instructions     acetaminophen (TYLENOL) 650 mg, Oral, EVERY 4 HOURS PRN     busPIRone (BUSPAR) 10 mg, Oral, 3 TIMES DAILY     cephALEXin (KEFLEX) 500 mg, Oral, 2 TIMES DAILY     cyclobenzaprine (FLEXERIL) 10 mg, Oral, 3 TIMES DAILY PRN     divalproex sodium delayed-release (DEPAKOTE) 500 mg, Oral, EVERY 12 HOURS     hydrOXYzine (ATARAX) 50 mg, Oral, EVERY 4 HOURS PRN     melatonin 5 mg, Oral, AT BEDTIME PRN     nicotine (COMMIT) 4 mg, Buccal, EVERY 1 HOUR PRN     nicotine (NICODERM CQ) 21 MG/24HR 24 hr patch 1 patch, Transdermal, DAILY     oxyCODONE (ROXICODONE) 5 mg, Oral, EVERY 6 HOURS PRN     paliperidone ER (INVEGA) 3 mg, Oral, AT BEDTIME     traZODone (DESYREL) 75 mg, Oral, AT BEDTIME PRN            Allergies   Allergen Reactions     Penicillins Itching         Physical Exam  /61   Pulse 75   Temp 97.9  F (36.6  C) (Oral)   Resp 16   SpO2 98%     HEENT:    mmm  Neck: supple  CV: ppi, regular   Resp: speaking in full sentences with any resp distress  Abd:  soft, nt, nd   Ext: Tenderness palpation lumbar paraspinous  muscles bilaterally.  Skin: warm dry well perfused  Neuro: Alert, 5 out of 5 motor bilateral lower extremities throughout, sensation intact light touch bilateral lower extremities      Labs and Imaging:    Labs Ordered and Resulted from Time of ED Arrival to Time of ED Departure   ROUTINE UA WITH MICROSCOPIC - Abnormal       Result Value    Color Urine Yellow      Appearance Urine Clear      Glucose Urine Negative      Bilirubin Urine Negative      Ketones Urine Negative      Specific Gravity Urine 1.022      Blood Urine Small (*)     pH Urine 5.5      Protein Albumin Urine 10 (*)     Urobilinogen Urine 2.0      Nitrite Urine Negative      Leukocyte Esterase Urine Large (*)     Mucus Urine Present (*)     RBC Urine 6 (*)     WBC Urine 23 (*)     Squamous Epithelials Urine 2 (*)     Hyaline Casts Urine 6 (*)    BASIC METABOLIC PANEL - Abnormal    Sodium 141      Potassium 3.6      Chloride 104      Carbon Dioxide (CO2) 26      Anion Gap 11      Urea Nitrogen 14.2      Creatinine 0.77      Calcium 9.1      Glucose 108 (*)     GFR Estimate >90     CBC WITH PLATELETS AND DIFFERENTIAL    WBC Count 7.3      RBC Count 3.88      Hemoglobin 12.3      Hematocrit 35.4      MCV 91      MCH 31.7      MCHC 34.7      RDW 12.2      Platelet Count 338      % Neutrophils 60      % Lymphocytes 30      % Monocytes 6      % Eosinophils 3      % Basophils 1      % Immature Granulocytes 0      NRBCs per 100 WBC 0      Absolute Neutrophils 4.5      Absolute Lymphocytes 2.2      Absolute Monocytes 0.4      Absolute Eosinophils 0.2      Absolute Basophils 0.0      Absolute Immature Granulocytes 0.0      Absolute NRBCs 0.0           Abd/pelvis CT,  IV  contrast only TRAUMA / AAA   Final Result   IMPRESSION:    1.  Normal kidneys.      2.  Urinary bladder is decompressed. No significant wall thickening.      3.  No appendicitis.      Lumbar spine CT w/o contrast   Final Result   IMPRESSION:   1.  Fracture of right transverse processes of L2,  age indeterminate; some cortication of fracture fragments suggests this is a chronic fracture.   2.  No definite other fractures.               Medications Administered in ED:  Medications   ketorolac (TORADOL) injection 30 mg (30 mg Intramuscular $Given 23)   acetaminophen (TYLENOL) tablet 650 mg (650 mg Oral $Given 23)   cyclobenzaprine (FLEXERIL) tablet 10 mg (10 mg Oral $Given 23)   cefTRIAXone (ROCEPHIN) 1 g vial to attach to  mL bag for ADULTS or NS 50 mL bag for PEDS (0 g Intravenous Stopped 23)   HYDROmorphone (PF) (DILAUDID) injection 0.5 mg (0.5 mg Intravenous $Given 23)   sodium chloride for CT scan flush use (58 mLs Intravenous $Given 23)   iopamidol (ISOVUE-370) solution 500 mL (73 mLs Intravenous $Given 23)           Independent Historian:      Review of External Notes:     Independent Interpretation (X-rays, CTs, rhythm strip):       Consultations/Discussion of Management or Tests:       Social Determinants of Health affecting care:    None      Medical Decision Makin-year-old female with atraumatic lower back pain for the last 5 days as well as urinary frequency.  Urinalysis suggestive of possible urinary tract infection she was started on antibiotics for this.  CT done to rule out concomitant stone or obstructive process and given redoing that we also did a CT of the L-spine no acute findings were seen on either of the scans.  Discharge home continue supportive care and short course of antibiotics for possible UTI.  They do mention a CT scan L2 spinous process fracture there appears to be some chronicity to this.      Diagnosis:    ICD-10-CM    1. Urinary tract infection in female  N39.0       2. Acute bilateral low back pain without sciatica  M54.50           Disposition:  Home      Alexandro Smith MD  [facility]  [facility]                       Alexandro Smith MD  [phone]

## 2023-06-12 NOTE — DISCHARGE INSTRUCTIONS
Return to ER immediately if you develop: worsening pain, new numbness or weakness in legs, loss of bowel or bladder control, inability to urinate, Fever > 101, persistent nausea or vomiting OR you have any other concerns about your health.